# Patient Record
Sex: MALE | Employment: OTHER | URBAN - METROPOLITAN AREA
[De-identification: names, ages, dates, MRNs, and addresses within clinical notes are randomized per-mention and may not be internally consistent; named-entity substitution may affect disease eponyms.]

---

## 2019-11-04 ENCOUNTER — HOSPITAL ENCOUNTER (OUTPATIENT)
Age: 59
Discharge: HOME | End: 2019-11-06
Payer: COMMERCIAL

## 2019-11-04 PROCEDURE — 88305 TISSUE EXAM BY PATHOLOGIST: CPT

## 2020-01-17 ENCOUNTER — HOSPITAL ENCOUNTER (INPATIENT)
Age: 60
LOS: 6 days | Discharge: HOME | DRG: 342 | End: 2020-01-23
Attending: EMERGENCY MEDICINE | Admitting: SURGERY
Payer: COMMERCIAL

## 2020-01-17 ENCOUNTER — ANESTHESIA (OUTPATIENT)
Dept: OPERATING ROOM | Age: 60
DRG: 342 | End: 2020-01-17
Payer: COMMERCIAL

## 2020-01-17 ENCOUNTER — APPOINTMENT (OUTPATIENT)
Dept: CT IMAGING | Age: 60
DRG: 342 | End: 2020-01-17
Payer: COMMERCIAL

## 2020-01-17 ENCOUNTER — ANESTHESIA EVENT (OUTPATIENT)
Dept: OPERATING ROOM | Age: 60
DRG: 342 | End: 2020-01-17
Payer: COMMERCIAL

## 2020-01-17 VITALS — TEMPERATURE: 96.6 F | DIASTOLIC BLOOD PRESSURE: 93 MMHG | SYSTOLIC BLOOD PRESSURE: 163 MMHG | OXYGEN SATURATION: 78 %

## 2020-01-17 PROBLEM — K37 APPENDICITIS: Status: ACTIVE | Noted: 2020-01-17

## 2020-01-17 PROBLEM — S36.892A MESENTERIC HEMATOMA: Status: ACTIVE | Noted: 2020-01-17

## 2020-01-17 LAB
ALBUMIN SERPL-MCNC: 4.6 G/DL (ref 3.5–5.2)
ALP BLD-CCNC: 71 U/L (ref 40–129)
ALT SERPL-CCNC: 83 U/L (ref 0–40)
ANION GAP SERPL CALCULATED.3IONS-SCNC: 14 MMOL/L (ref 7–16)
AST SERPL-CCNC: 45 U/L (ref 0–39)
BACTERIA: ABNORMAL /HPF
BASOPHILS ABSOLUTE: 0.05 E9/L (ref 0–0.2)
BASOPHILS RELATIVE PERCENT: 0.6 % (ref 0–2)
BILIRUB SERPL-MCNC: 0.5 MG/DL (ref 0–1.2)
BILIRUBIN URINE: NEGATIVE
BLOOD, URINE: ABNORMAL
BUN BLDV-MCNC: 16 MG/DL (ref 6–20)
CALCIUM SERPL-MCNC: 10.1 MG/DL (ref 8.6–10.2)
CHLORIDE BLD-SCNC: 94 MMOL/L (ref 98–107)
CLARITY: CLEAR
CO2: 26 MMOL/L (ref 22–29)
COLOR: YELLOW
CREAT SERPL-MCNC: 0.9 MG/DL (ref 0.7–1.2)
EOSINOPHILS ABSOLUTE: 0.13 E9/L (ref 0.05–0.5)
EOSINOPHILS RELATIVE PERCENT: 1.6 % (ref 0–6)
GFR AFRICAN AMERICAN: >60
GFR NON-AFRICAN AMERICAN: >60 ML/MIN/1.73
GLUCOSE BLD-MCNC: 289 MG/DL (ref 74–99)
GLUCOSE URINE: >=1000 MG/DL
HCT VFR BLD CALC: 39.8 % (ref 37–54)
HEMOGLOBIN: 13.6 G/DL (ref 12.5–16.5)
IMMATURE GRANULOCYTES #: 0.03 E9/L
IMMATURE GRANULOCYTES %: 0.4 % (ref 0–5)
KETONES, URINE: NEGATIVE MG/DL
LACTIC ACID: 2 MMOL/L (ref 0.5–2.2)
LACTIC ACID: 2.4 MMOL/L (ref 0.5–2.2)
LEUKOCYTE ESTERASE, URINE: NEGATIVE
LYMPHOCYTES ABSOLUTE: 1.17 E9/L (ref 1.5–4)
LYMPHOCYTES RELATIVE PERCENT: 14.1 % (ref 20–42)
MCH RBC QN AUTO: 30 PG (ref 26–35)
MCHC RBC AUTO-ENTMCNC: 34.2 % (ref 32–34.5)
MCV RBC AUTO: 87.7 FL (ref 80–99.9)
METER GLUCOSE: 148 MG/DL (ref 74–99)
METER GLUCOSE: 236 MG/DL (ref 74–99)
MONOCYTES ABSOLUTE: 0.72 E9/L (ref 0.1–0.95)
MONOCYTES RELATIVE PERCENT: 8.7 % (ref 2–12)
NEUTROPHILS ABSOLUTE: 6.21 E9/L (ref 1.8–7.3)
NEUTROPHILS RELATIVE PERCENT: 74.6 % (ref 43–80)
NITRITE, URINE: NEGATIVE
PDW BLD-RTO: 12.8 FL (ref 11.5–15)
PH UA: 6.5 (ref 5–9)
PLATELET # BLD: 165 E9/L (ref 130–450)
PMV BLD AUTO: 10.2 FL (ref 7–12)
POTASSIUM SERPL-SCNC: 4 MMOL/L (ref 3.5–5)
PROTEIN UA: >=300 MG/DL
RBC # BLD: 4.54 E12/L (ref 3.8–5.8)
RBC UA: ABNORMAL /HPF (ref 0–2)
SODIUM BLD-SCNC: 134 MMOL/L (ref 132–146)
SPECIFIC GRAVITY UA: >=1.03 (ref 1–1.03)
TOTAL PROTEIN: 7.4 G/DL (ref 6.4–8.3)
UROBILINOGEN, URINE: 0.2 E.U./DL
WBC # BLD: 8.3 E9/L (ref 4.5–11.5)
WBC UA: ABNORMAL /HPF (ref 0–5)

## 2020-01-17 PROCEDURE — 6360000002 HC RX W HCPCS: Performed by: ANESTHESIOLOGY

## 2020-01-17 PROCEDURE — 2500000003 HC RX 250 WO HCPCS: Performed by: NURSE ANESTHETIST, CERTIFIED REGISTERED

## 2020-01-17 PROCEDURE — 0WJG4ZZ INSPECTION OF PERITONEAL CAVITY, PERCUTANEOUS ENDOSCOPIC APPROACH: ICD-10-PCS | Performed by: SURGERY

## 2020-01-17 PROCEDURE — 6360000004 HC RX CONTRAST MEDICATION: Performed by: RADIOLOGY

## 2020-01-17 PROCEDURE — 2780000010 HC IMPLANT OTHER: Performed by: SURGERY

## 2020-01-17 PROCEDURE — 82962 GLUCOSE BLOOD TEST: CPT

## 2020-01-17 PROCEDURE — 2580000003 HC RX 258: Performed by: PHYSICIAN ASSISTANT

## 2020-01-17 PROCEDURE — 88304 TISSUE EXAM BY PATHOLOGIST: CPT

## 2020-01-17 PROCEDURE — 6360000002 HC RX W HCPCS: Performed by: SURGERY

## 2020-01-17 PROCEDURE — 87081 CULTURE SCREEN ONLY: CPT

## 2020-01-17 PROCEDURE — 81001 URINALYSIS AUTO W/SCOPE: CPT

## 2020-01-17 PROCEDURE — 74177 CT ABD & PELVIS W/CONTRAST: CPT

## 2020-01-17 PROCEDURE — 99285 EMERGENCY DEPT VISIT HI MDM: CPT

## 2020-01-17 PROCEDURE — 2580000003 HC RX 258: Performed by: NURSE ANESTHETIST, CERTIFIED REGISTERED

## 2020-01-17 PROCEDURE — 2700000000 HC OXYGEN THERAPY PER DAY

## 2020-01-17 PROCEDURE — 6360000002 HC RX W HCPCS: Performed by: NURSE ANESTHETIST, CERTIFIED REGISTERED

## 2020-01-17 PROCEDURE — 3600000004 HC SURGERY LEVEL 4 BASE: Performed by: SURGERY

## 2020-01-17 PROCEDURE — 7100000000 HC PACU RECOVERY - FIRST 15 MIN: Performed by: SURGERY

## 2020-01-17 PROCEDURE — 3700000000 HC ANESTHESIA ATTENDED CARE: Performed by: SURGERY

## 2020-01-17 PROCEDURE — 0DJV0ZZ INSPECTION OF MESENTERY, OPEN APPROACH: ICD-10-PCS | Performed by: SURGERY

## 2020-01-17 PROCEDURE — 2709999900 HC NON-CHARGEABLE SUPPLY: Performed by: SURGERY

## 2020-01-17 PROCEDURE — 0DTJ0ZZ RESECTION OF APPENDIX, OPEN APPROACH: ICD-10-PCS | Performed by: SURGERY

## 2020-01-17 PROCEDURE — 3700000001 HC ADD 15 MINUTES (ANESTHESIA): Performed by: SURGERY

## 2020-01-17 PROCEDURE — 3600000014 HC SURGERY LEVEL 4 ADDTL 15MIN: Performed by: SURGERY

## 2020-01-17 PROCEDURE — 2720000010 HC SURG SUPPLY STERILE: Performed by: SURGERY

## 2020-01-17 PROCEDURE — 83605 ASSAY OF LACTIC ACID: CPT

## 2020-01-17 PROCEDURE — 85025 COMPLETE CBC W/AUTO DIFF WBC: CPT

## 2020-01-17 PROCEDURE — 2580000003 HC RX 258: Performed by: SURGERY

## 2020-01-17 PROCEDURE — 2060000000 HC ICU INTERMEDIATE R&B

## 2020-01-17 PROCEDURE — 36415 COLL VENOUS BLD VENIPUNCTURE: CPT

## 2020-01-17 PROCEDURE — 99255 IP/OBS CONSLTJ NEW/EST HI 80: CPT | Performed by: SURGERY

## 2020-01-17 PROCEDURE — 6360000002 HC RX W HCPCS: Performed by: PHYSICIAN ASSISTANT

## 2020-01-17 PROCEDURE — 2500000003 HC RX 250 WO HCPCS: Performed by: PHYSICIAN ASSISTANT

## 2020-01-17 PROCEDURE — 80053 COMPREHEN METABOLIC PANEL: CPT

## 2020-01-17 PROCEDURE — 96365 THER/PROPH/DIAG IV INF INIT: CPT

## 2020-01-17 PROCEDURE — 7100000001 HC PACU RECOVERY - ADDTL 15 MIN: Performed by: SURGERY

## 2020-01-17 RX ORDER — ONDANSETRON 2 MG/ML
INJECTION INTRAMUSCULAR; INTRAVENOUS PRN
Status: DISCONTINUED | OUTPATIENT
Start: 2020-01-17 | End: 2020-01-17 | Stop reason: SDUPTHER

## 2020-01-17 RX ORDER — ROCURONIUM BROMIDE 10 MG/ML
INJECTION, SOLUTION INTRAVENOUS PRN
Status: DISCONTINUED | OUTPATIENT
Start: 2020-01-17 | End: 2020-01-17 | Stop reason: SDUPTHER

## 2020-01-17 RX ORDER — ONDANSETRON 2 MG/ML
4 INJECTION INTRAMUSCULAR; INTRAVENOUS EVERY 6 HOURS PRN
Status: DISCONTINUED | OUTPATIENT
Start: 2020-01-17 | End: 2020-01-23 | Stop reason: HOSPADM

## 2020-01-17 RX ORDER — OXYCODONE HYDROCHLORIDE AND ACETAMINOPHEN 5; 325 MG/1; MG/1
1 TABLET ORAL EVERY 4 HOURS PRN
Status: DISCONTINUED | OUTPATIENT
Start: 2020-01-17 | End: 2020-01-23 | Stop reason: HOSPADM

## 2020-01-17 RX ORDER — OXYCODONE HYDROCHLORIDE AND ACETAMINOPHEN 5; 325 MG/1; MG/1
2 TABLET ORAL EVERY 4 HOURS PRN
Status: DISCONTINUED | OUTPATIENT
Start: 2020-01-17 | End: 2020-01-23 | Stop reason: HOSPADM

## 2020-01-17 RX ORDER — MORPHINE SULFATE 2 MG/ML
2 INJECTION, SOLUTION INTRAMUSCULAR; INTRAVENOUS EVERY 4 HOURS PRN
Status: DISCONTINUED | OUTPATIENT
Start: 2020-01-17 | End: 2020-01-23

## 2020-01-17 RX ORDER — GLYBURIDE 5 MG/1
5 TABLET ORAL 2 TIMES DAILY WITH MEALS
COMMUNITY
End: 2020-12-03

## 2020-01-17 RX ORDER — SERTRALINE HYDROCHLORIDE 25 MG/1
100 TABLET, FILM COATED ORAL DAILY
COMMUNITY
End: 2020-12-03

## 2020-01-17 RX ORDER — FENOFIBRATE 134 MG/1
134 CAPSULE ORAL
COMMUNITY
End: 2020-12-03 | Stop reason: SDUPTHER

## 2020-01-17 RX ORDER — LOSARTAN POTASSIUM 100 MG/1
100 TABLET ORAL DAILY
COMMUNITY
End: 2020-08-10 | Stop reason: SDUPTHER

## 2020-01-17 RX ORDER — LABETALOL HYDROCHLORIDE 5 MG/ML
5 INJECTION, SOLUTION INTRAVENOUS EVERY 10 MIN PRN
Status: DISCONTINUED | OUTPATIENT
Start: 2020-01-17 | End: 2020-01-17 | Stop reason: HOSPADM

## 2020-01-17 RX ORDER — PROPOFOL 10 MG/ML
INJECTION, EMULSION INTRAVENOUS PRN
Status: DISCONTINUED | OUTPATIENT
Start: 2020-01-17 | End: 2020-01-17 | Stop reason: SDUPTHER

## 2020-01-17 RX ORDER — SODIUM CHLORIDE 9 MG/ML
INJECTION, SOLUTION INTRAVENOUS CONTINUOUS PRN
Status: DISCONTINUED | OUTPATIENT
Start: 2020-01-17 | End: 2020-01-17 | Stop reason: SDUPTHER

## 2020-01-17 RX ORDER — FENTANYL CITRATE 50 UG/ML
INJECTION, SOLUTION INTRAMUSCULAR; INTRAVENOUS PRN
Status: DISCONTINUED | OUTPATIENT
Start: 2020-01-17 | End: 2020-01-17 | Stop reason: SDUPTHER

## 2020-01-17 RX ORDER — SODIUM CHLORIDE, SODIUM LACTATE, POTASSIUM CHLORIDE, CALCIUM CHLORIDE 600; 310; 30; 20 MG/100ML; MG/100ML; MG/100ML; MG/100ML
INJECTION, SOLUTION INTRAVENOUS CONTINUOUS PRN
Status: DISCONTINUED | OUTPATIENT
Start: 2020-01-17 | End: 2020-01-17

## 2020-01-17 RX ORDER — METOPROLOL SUCCINATE 50 MG/1
50 TABLET, EXTENDED RELEASE ORAL DAILY
Status: ON HOLD | COMMUNITY
End: 2020-01-23 | Stop reason: HOSPADM

## 2020-01-17 RX ORDER — ALPRAZOLAM 0.25 MG/1
0.25 TABLET ORAL 2 TIMES DAILY PRN
COMMUNITY
End: 2021-05-13

## 2020-01-17 RX ORDER — SODIUM CHLORIDE 9 MG/ML
INJECTION, SOLUTION INTRAVENOUS CONTINUOUS
Status: DISCONTINUED | OUTPATIENT
Start: 2020-01-17 | End: 2020-01-20

## 2020-01-17 RX ORDER — SODIUM CHLORIDE 0.9 % (FLUSH) 0.9 %
10 SYRINGE (ML) INJECTION PRN
Status: DISCONTINUED | OUTPATIENT
Start: 2020-01-17 | End: 2020-01-23 | Stop reason: HOSPADM

## 2020-01-17 RX ORDER — SODIUM CHLORIDE 0.9 % (FLUSH) 0.9 %
10 SYRINGE (ML) INJECTION EVERY 12 HOURS SCHEDULED
Status: DISCONTINUED | OUTPATIENT
Start: 2020-01-18 | End: 2020-01-23 | Stop reason: HOSPADM

## 2020-01-17 RX ORDER — SODIUM CHLORIDE 9 MG/ML
INJECTION, SOLUTION INTRAVENOUS CONTINUOUS
Status: DISCONTINUED | OUTPATIENT
Start: 2020-01-18 | End: 2020-01-18

## 2020-01-17 RX ORDER — PRAVASTATIN SODIUM 40 MG
40 TABLET ORAL DAILY
COMMUNITY
End: 2020-09-08 | Stop reason: SDUPTHER

## 2020-01-17 RX ORDER — MIDAZOLAM HYDROCHLORIDE 1 MG/ML
INJECTION INTRAMUSCULAR; INTRAVENOUS PRN
Status: DISCONTINUED | OUTPATIENT
Start: 2020-01-17 | End: 2020-01-17 | Stop reason: SDUPTHER

## 2020-01-17 RX ORDER — DEXAMETHASONE SODIUM PHOSPHATE 4 MG/ML
INJECTION, SOLUTION INTRA-ARTICULAR; INTRALESIONAL; INTRAMUSCULAR; INTRAVENOUS; SOFT TISSUE PRN
Status: DISCONTINUED | OUTPATIENT
Start: 2020-01-17 | End: 2020-01-17 | Stop reason: SDUPTHER

## 2020-01-17 RX ORDER — MORPHINE SULFATE 2 MG/ML
2 INJECTION, SOLUTION INTRAMUSCULAR; INTRAVENOUS ONCE
Status: DISCONTINUED | OUTPATIENT
Start: 2020-01-17 | End: 2020-01-17

## 2020-01-17 RX ORDER — LIDOCAINE HYDROCHLORIDE 20 MG/ML
INJECTION, SOLUTION EPIDURAL; INFILTRATION; INTRACAUDAL; PERINEURAL PRN
Status: DISCONTINUED | OUTPATIENT
Start: 2020-01-17 | End: 2020-01-17 | Stop reason: SDUPTHER

## 2020-01-17 RX ORDER — SODIUM CHLORIDE, SODIUM LACTATE, POTASSIUM CHLORIDE, CALCIUM CHLORIDE 600; 310; 30; 20 MG/100ML; MG/100ML; MG/100ML; MG/100ML
1000 INJECTION, SOLUTION INTRAVENOUS CONTINUOUS
Status: DISCONTINUED | OUTPATIENT
Start: 2020-01-17 | End: 2020-01-17

## 2020-01-17 RX ORDER — ACETAMINOPHEN 325 MG/1
650 TABLET ORAL EVERY 4 HOURS PRN
Status: DISCONTINUED | OUTPATIENT
Start: 2020-01-17 | End: 2020-01-21

## 2020-01-17 RX ORDER — MORPHINE SULFATE 4 MG/ML
4 INJECTION, SOLUTION INTRAMUSCULAR; INTRAVENOUS EVERY 4 HOURS PRN
Status: DISCONTINUED | OUTPATIENT
Start: 2020-01-17 | End: 2020-01-23

## 2020-01-17 RX ORDER — DIPHENHYDRAMINE HYDROCHLORIDE 50 MG/ML
INJECTION INTRAMUSCULAR; INTRAVENOUS PRN
Status: DISCONTINUED | OUTPATIENT
Start: 2020-01-17 | End: 2020-01-17 | Stop reason: SDUPTHER

## 2020-01-17 RX ORDER — PROMETHAZINE HYDROCHLORIDE 25 MG/ML
6.25 INJECTION, SOLUTION INTRAMUSCULAR; INTRAVENOUS
Status: DISCONTINUED | OUTPATIENT
Start: 2020-01-17 | End: 2020-01-17 | Stop reason: HOSPADM

## 2020-01-17 RX ORDER — SODIUM CHLORIDE 0.9 % (FLUSH) 0.9 %
10 SYRINGE (ML) INJECTION EVERY 12 HOURS SCHEDULED
Status: DISCONTINUED | OUTPATIENT
Start: 2020-01-17 | End: 2020-01-17

## 2020-01-17 RX ADMIN — DIPHENHYDRAMINE HYDROCHLORIDE 25 MG: 50 INJECTION, SOLUTION INTRAMUSCULAR; INTRAVENOUS at 20:06

## 2020-01-17 RX ADMIN — FENTANYL CITRATE 100 MCG: 50 INJECTION, SOLUTION INTRAMUSCULAR; INTRAVENOUS at 22:08

## 2020-01-17 RX ADMIN — ROCURONIUM BROMIDE 10 MG: 10 SOLUTION INTRAVENOUS at 21:18

## 2020-01-17 RX ADMIN — CEFTRIAXONE 2 G: 2 INJECTION, POWDER, FOR SOLUTION INTRAMUSCULAR; INTRAVENOUS at 18:15

## 2020-01-17 RX ADMIN — SUGAMMADEX 400 MG: 100 INJECTION, SOLUTION INTRAVENOUS at 21:38

## 2020-01-17 RX ADMIN — SODIUM CHLORIDE: 9 INJECTION, SOLUTION INTRAVENOUS at 18:15

## 2020-01-17 RX ADMIN — ONDANSETRON HYDROCHLORIDE 4 MG: 2 INJECTION, SOLUTION INTRAMUSCULAR; INTRAVENOUS at 20:06

## 2020-01-17 RX ADMIN — HYDROMORPHONE HYDROCHLORIDE 0.5 MG: 1 INJECTION, SOLUTION INTRAMUSCULAR; INTRAVENOUS; SUBCUTANEOUS at 23:13

## 2020-01-17 RX ADMIN — IOPAMIDOL 110 ML: 755 INJECTION, SOLUTION INTRAVENOUS at 15:25

## 2020-01-17 RX ADMIN — ROCURONIUM BROMIDE 40 MG: 10 SOLUTION INTRAVENOUS at 20:06

## 2020-01-17 RX ADMIN — MIDAZOLAM 2 MG: 1 INJECTION INTRAMUSCULAR; INTRAVENOUS at 20:00

## 2020-01-17 RX ADMIN — METRONIDAZOLE 500 MG: 500 INJECTION, SOLUTION INTRAVENOUS at 16:01

## 2020-01-17 RX ADMIN — FENTANYL CITRATE 50 MCG: 50 INJECTION, SOLUTION INTRAMUSCULAR; INTRAVENOUS at 21:18

## 2020-01-17 RX ADMIN — LIDOCAINE HYDROCHLORIDE 100 MG: 20 INJECTION, SOLUTION EPIDURAL; INFILTRATION; INTRACAUDAL; PERINEURAL at 20:06

## 2020-01-17 RX ADMIN — HYDROMORPHONE HYDROCHLORIDE 0.5 MG: 1 INJECTION, SOLUTION INTRAMUSCULAR; INTRAVENOUS; SUBCUTANEOUS at 22:50

## 2020-01-17 RX ADMIN — PROPOFOL 200 MG: 10 INJECTION, EMULSION INTRAVENOUS at 20:06

## 2020-01-17 RX ADMIN — SUGAMMADEX 100 MG: 100 INJECTION, SOLUTION INTRAVENOUS at 21:45

## 2020-01-17 RX ADMIN — SODIUM CHLORIDE: 9 INJECTION, SOLUTION INTRAVENOUS at 20:50

## 2020-01-17 RX ADMIN — DEXAMETHASONE SODIUM PHOSPHATE 8 MG: 4 INJECTION, SOLUTION INTRAMUSCULAR; INTRAVENOUS at 20:06

## 2020-01-17 RX ADMIN — MORPHINE SULFATE 4 MG: 4 INJECTION, SOLUTION INTRAMUSCULAR; INTRAVENOUS at 18:27

## 2020-01-17 RX ADMIN — FENTANYL CITRATE 50 MCG: 50 INJECTION, SOLUTION INTRAMUSCULAR; INTRAVENOUS at 22:13

## 2020-01-17 RX ADMIN — ROCURONIUM BROMIDE 20 MG: 10 SOLUTION INTRAVENOUS at 21:25

## 2020-01-17 RX ADMIN — SODIUM CHLORIDE, POTASSIUM CHLORIDE, SODIUM LACTATE AND CALCIUM CHLORIDE: 600; 310; 30; 20 INJECTION, SOLUTION INTRAVENOUS at 20:03

## 2020-01-17 RX ADMIN — FENTANYL CITRATE 200 MCG: 50 INJECTION, SOLUTION INTRAMUSCULAR; INTRAVENOUS at 20:06

## 2020-01-17 SDOH — HEALTH STABILITY: MENTAL HEALTH: HOW OFTEN DO YOU HAVE A DRINK CONTAINING ALCOHOL?: NEVER

## 2020-01-17 ASSESSMENT — PULMONARY FUNCTION TESTS
PIF_VALUE: 24
PIF_VALUE: 25
PIF_VALUE: 26
PIF_VALUE: 18
PIF_VALUE: 21
PIF_VALUE: 25
PIF_VALUE: 33
PIF_VALUE: 24
PIF_VALUE: 13
PIF_VALUE: 22
PIF_VALUE: 21
PIF_VALUE: 25
PIF_VALUE: 20
PIF_VALUE: 27
PIF_VALUE: 21
PIF_VALUE: 31
PIF_VALUE: 5
PIF_VALUE: 24
PIF_VALUE: 24
PIF_VALUE: 26
PIF_VALUE: 13
PIF_VALUE: 22
PIF_VALUE: 26
PIF_VALUE: 25
PIF_VALUE: 27
PIF_VALUE: 1
PIF_VALUE: 25
PIF_VALUE: 25
PIF_VALUE: 1
PIF_VALUE: 34
PIF_VALUE: 31
PIF_VALUE: 0
PIF_VALUE: 25
PIF_VALUE: 30
PIF_VALUE: 25
PIF_VALUE: 24
PIF_VALUE: 18
PIF_VALUE: 25
PIF_VALUE: 26
PIF_VALUE: 21
PIF_VALUE: 21
PIF_VALUE: 26
PIF_VALUE: 23
PIF_VALUE: 24
PIF_VALUE: 24
PIF_VALUE: 26
PIF_VALUE: 3
PIF_VALUE: 34
PIF_VALUE: 31
PIF_VALUE: 22
PIF_VALUE: 21
PIF_VALUE: 25
PIF_VALUE: 5
PIF_VALUE: 25
PIF_VALUE: 22
PIF_VALUE: 25
PIF_VALUE: 21
PIF_VALUE: 21
PIF_VALUE: 24
PIF_VALUE: 25
PIF_VALUE: 25
PIF_VALUE: 21
PIF_VALUE: 24
PIF_VALUE: 26
PIF_VALUE: 22
PIF_VALUE: 27
PIF_VALUE: 22
PIF_VALUE: 25
PIF_VALUE: 0
PIF_VALUE: 18
PIF_VALUE: 19
PIF_VALUE: 25
PIF_VALUE: 22
PIF_VALUE: 33
PIF_VALUE: 9
PIF_VALUE: 18
PIF_VALUE: 1
PIF_VALUE: 18
PIF_VALUE: 2
PIF_VALUE: 25
PIF_VALUE: 25
PIF_VALUE: 24
PIF_VALUE: 22
PIF_VALUE: 24
PIF_VALUE: 26
PIF_VALUE: 26
PIF_VALUE: 22
PIF_VALUE: 24
PIF_VALUE: 24
PIF_VALUE: 32
PIF_VALUE: 15
PIF_VALUE: 25
PIF_VALUE: 24
PIF_VALUE: 1
PIF_VALUE: 31
PIF_VALUE: 25
PIF_VALUE: 1
PIF_VALUE: 18
PIF_VALUE: 22
PIF_VALUE: 26
PIF_VALUE: 22
PIF_VALUE: 24
PIF_VALUE: 18
PIF_VALUE: 24
PIF_VALUE: 39
PIF_VALUE: 35
PIF_VALUE: 20
PIF_VALUE: 25
PIF_VALUE: 24
PIF_VALUE: 30
PIF_VALUE: 25
PIF_VALUE: 25
PIF_VALUE: 37
PIF_VALUE: 14
PIF_VALUE: 25
PIF_VALUE: 25
PIF_VALUE: 1
PIF_VALUE: 31
PIF_VALUE: 1
PIF_VALUE: 25
PIF_VALUE: 0
PIF_VALUE: 30
PIF_VALUE: 0
PIF_VALUE: 33
PIF_VALUE: 0
PIF_VALUE: 22

## 2020-01-17 ASSESSMENT — LIFESTYLE VARIABLES: SMOKING_STATUS: 0

## 2020-01-17 ASSESSMENT — PAIN DESCRIPTION - FREQUENCY
FREQUENCY: INTERMITTENT
FREQUENCY: INTERMITTENT
FREQUENCY: CONTINUOUS

## 2020-01-17 ASSESSMENT — PAIN DESCRIPTION - LOCATION
LOCATION: ABDOMEN

## 2020-01-17 ASSESSMENT — PAIN SCALES - WONG BAKER
WONGBAKER_NUMERICALRESPONSE: 6

## 2020-01-17 ASSESSMENT — PAIN SCALES - GENERAL
PAINLEVEL_OUTOF10: 6
PAINLEVEL_OUTOF10: 8
PAINLEVEL_OUTOF10: 4
PAINLEVEL_OUTOF10: 7
PAINLEVEL_OUTOF10: 5

## 2020-01-17 ASSESSMENT — PAIN DESCRIPTION - DESCRIPTORS
DESCRIPTORS: ACHING;HEADACHE;HEAVINESS
DESCRIPTORS: ACHING;DISCOMFORT;DULL
DESCRIPTORS: THROBBING

## 2020-01-17 ASSESSMENT — PAIN DESCRIPTION - ONSET
ONSET: ON-GOING
ONSET: ON-GOING

## 2020-01-17 ASSESSMENT — PAIN DESCRIPTION - PAIN TYPE
TYPE: ACUTE PAIN

## 2020-01-17 ASSESSMENT — PAIN DESCRIPTION - PROGRESSION
CLINICAL_PROGRESSION: NOT CHANGED
CLINICAL_PROGRESSION: GRADUALLY WORSENING

## 2020-01-17 ASSESSMENT — PAIN DESCRIPTION - ORIENTATION
ORIENTATION: RIGHT
ORIENTATION: RIGHT;LOWER
ORIENTATION: RIGHT

## 2020-01-17 ASSESSMENT — PAIN - FUNCTIONAL ASSESSMENT
PAIN_FUNCTIONAL_ASSESSMENT: ACTIVITIES ARE NOT PREVENTED
PAIN_FUNCTIONAL_ASSESSMENT: ACTIVITIES ARE NOT PREVENTED

## 2020-01-17 NOTE — ED PROVIDER NOTES
ED Attending  CC: Esperanza       Department of Emergency Medicine   ED  Provider Note  Admit Date/RoomTime: 1/17/2020 12:08 PM  ED Room: CARISSA/CARISSA  Chief Complaint     Abdominal Pain (RLQ)    History of Present Illness   Source of history provided by:  patient. History/Exam Limitations: none. Chas Parson is a 64 y.o. old male who has a past medical history of: History reviewed. No pertinent past medical history. presents to the emergency department by private vehicle, for complaints of sided abdominal pain mostly right lower quadrant starting yesterday. Patient states it has stayed about the same since it started. Anything making it worse or better. Patient went to his PCPs office today and they sent him here for appendicitis rule out. Patient denies previous abdominal surgeries. PMHx DM, HTN. Denies fever/chills, headache, vision change, dizziness, chest pain, dyspnea, NVD, melena, bloody stools, urinary symptoms, numbness/weakness. ROS   Pertinent positives and negatives are stated within HPI, all other systems reviewed and are negative. Past Surgical History:   Procedure Laterality Date    COLONOSCOPY  11/04/2019    Tubular adenoma; diverticulosis   Social History:  reports that he has never smoked. He has never used smokeless tobacco. He reports that he does not drink alcohol or use drugs. Family History: family history is not on file. Allergies: Patient has no known allergies. Physical Exam           ED Triage Vitals [01/17/20 1206]   BP Temp Temp src Pulse Resp SpO2 Height Weight   (!) 170/98 98.8 °F (37.1 °C) -- 105 16 95 % 5' 6\" (1.676 m) 212 lb (96.2 kg)      Oxygen Saturation Interpretation: Normal.    · General Appearance/Constitutional:  Alert, development consistent with age. · HEENT:  NC/NT. PERRLA. Airway patent. · Neck:  Supple. No lymphadenopathy. · Respiratory: Lungs Clear to auscultation and breath sounds equal.  · CV:  Regular rate and rhythm.   · GI:  General Appearance: normal.         Bowel sounds: normal bowel sounds. Distension:  None. Tenderness: mild tenderness is present in the RUQ and in the RLQ. Liver: non-palpable. Spleen:  non-tender. Pulsatile Mass: absent. Hernia:  no inguinal or femoral hernias noted. · Back: CVA Tenderness: No.  · Integument:  Normal turgor. Warm, dry, without visible rash, unless noted elsewhere. · Neurological:  Orientation age-appropriate. Motor functions intact.     Lab / Imaging Results   (All laboratory and radiology results have been personally reviewed by myself)  Labs:  Results for orders placed or performed during the hospital encounter of 01/17/20   CBC auto differential   Result Value Ref Range    WBC 8.3 4.5 - 11.5 E9/L    RBC 4.54 3.80 - 5.80 E12/L    Hemoglobin 13.6 12.5 - 16.5 g/dL    Hematocrit 39.8 37.0 - 54.0 %    MCV 87.7 80.0 - 99.9 fL    MCH 30.0 26.0 - 35.0 pg    MCHC 34.2 32.0 - 34.5 %    RDW 12.8 11.5 - 15.0 fL    Platelets 763 242 - 071 E9/L    MPV 10.2 7.0 - 12.0 fL    Neutrophils % 74.6 43.0 - 80.0 %    Immature Granulocytes % 0.4 0.0 - 5.0 %    Lymphocytes % 14.1 (L) 20.0 - 42.0 %    Monocytes % 8.7 2.0 - 12.0 %    Eosinophils % 1.6 0.0 - 6.0 %    Basophils % 0.6 0.0 - 2.0 %    Neutrophils Absolute 6.21 1.80 - 7.30 E9/L    Immature Granulocytes # 0.03 E9/L    Lymphocytes Absolute 1.17 (L) 1.50 - 4.00 E9/L    Monocytes Absolute 0.72 0.10 - 0.95 E9/L    Eosinophils Absolute 0.13 0.05 - 0.50 E9/L    Basophils Absolute 0.05 0.00 - 0.20 E9/L   Comprehensive Metabolic Panel   Result Value Ref Range    Sodium 134 132 - 146 mmol/L    Potassium 4.0 3.5 - 5.0 mmol/L    Chloride 94 (L) 98 - 107 mmol/L    CO2 26 22 - 29 mmol/L    Anion Gap 14 7 - 16 mmol/L    Glucose 289 (H) 74 - 99 mg/dL    BUN 16 6 - 20 mg/dL    CREATININE 0.9 0.7 - 1.2 mg/dL    GFR Non-African American >60 >=60 mL/min/1.73    GFR African American >60     Calcium 10.1 8.6 - 10.2 mg/dL    Total Protein 7.4 6.4 - 8.3 g/dL    Alb 4.6 3.5 - 5.2 g/dL    Total Bilirubin 0.5 0.0 - 1.2 mg/dL    Alkaline Phosphatase 71 40 - 129 U/L    ALT 83 (H) 0 - 40 U/L    AST 45 (H) 0 - 39 U/L   Lactic Acid, Plasma   Result Value Ref Range    Lactic Acid 2.4 (H) 0.5 - 2.2 mmol/L   Urinalysis with Microscopic   Result Value Ref Range    Color, UA Yellow Straw/Yellow    Clarity, UA Clear Clear    Glucose, Ur >=1000 (A) Negative mg/dL    Bilirubin Urine Negative Negative    Ketones, Urine Negative Negative mg/dL    Specific Gravity, UA >=1.030 1.005 - 1.030    Blood, Urine TRACE-INTACT Negative    pH, UA 6.5 5.0 - 9.0    Protein, UA >=300 (A) Negative mg/dL    Urobilinogen, Urine 0.2 <2.0 E.U./dL    Nitrite, Urine Negative Negative    Leukocyte Esterase, Urine Negative Negative    WBC, UA NONE 0 - 5 /HPF    RBC, UA 0-1 0 - 2 /HPF    Bacteria, UA NONE /HPF     Imaging: All Radiology results interpreted by Radiologist unless otherwise noted. CT ABDOMEN PELVIS W IV CONTRAST Additional Contrast? None   Final Result         1. The appendix is mildly dilated to 9 mm. Mild mesenteric edema is   seen adjacent to the appendix but not surrounding it. These findings   may signify early acute appendicitis. 2. Mild hepatosplenomegaly. Hepatic steatosis. 3. Cholelithiasis. ED Course / Medical Decision Making     Medications   metronidazole (FLAGYL) 500 mg in NaCl 100 mL IVPB premix (500 mg Intravenous New Bag 1/17/20 1601)   cefTRIAXone (ROCEPHIN) 2 g IVPB in D5W 50ml minibag (has no administration in time range)   iopamidol (ISOVUE-370) 76 % injection 110 mL (110 mLs Intravenous Given 1/17/20 1525)     ED Course as of Jan 17 1637 Fri Jan 17, 2020   1303 Patient declining pain medication    [KL]      ED Course User Index  [KL] Jens Sherman PA-C         Consultations:             IP CONSULT TO GENERAL SURGERY 1550 spoke with Dr. Jessica Burr and will send residents to evaluate.  1612 spoke with residents and they will talk with Dr. Jennifer Bradshaw     Procedures:   none    MDM: Presenting with right lower quadrant abdominal pain starting yesterday. Patient denies any associated symptoms. Patient is in no acute distress and is nontoxic in appearance. Refused pain medication. Denies previous abdominal surgeries. Patient CT showing early appendicitis. Call to Dr. Jennifer Bradshaw who will take patient to the OR. Counseling:   I have spoken with the patient and discussed todays results, in addition to providing specific details for the plan of care and counseling regarding the diagnosis and prognosis and are agreeable with the plan. Assessment      1. Abdominal pain, right lower quadrant    2. Acute appendicitis with perforation and generalized peritonitis, without abscess or gangrene      This patient's ED course included: a personal history and physicial examination and multiple bedside re-evaluations  This patient has remained hemodynamically stable during their ED course. Plan   Admit to operating room. Patient condition is stable. New Medications     New Prescriptions    No medications on file     Electronically signed by Aftab Hines PA-C   DD: 1/17/20  **This report was transcribed using voice recognition software. Every effort was made to ensure accuracy; however, inadvertent computerized transcription errors may be present.   END OF PROVIDER NOTE     Aftab Hines PA-C  01/17/20 8336

## 2020-01-17 NOTE — ED NOTES
Bed:  [place] [address]  Expected date:   Expected time:   Means of arrival:   Comments:  Piter Meek RN  01/17/20 9232

## 2020-01-17 NOTE — ANESTHESIA PRE PROCEDURE
chloride infusion   Intravenous Continuous Chaparrita E Kaercher, DO        [START ON 1/18/2020] metronidazole (FLAGYL) 500 mg in NaCl 100 mL IVPB premix  500 mg Intravenous Q8H Chaparrita E Kaercher, DO        [START ON 1/18/2020] cefTRIAXone (ROCEPHIN) 1 g in dextrose 5 % 50 mL IVPB (vial-mate)  1 g Intravenous Daily Chaparrita E Kaercher, DO        acetaminophen (TYLENOL) tablet 650 mg  650 mg Oral Q4H PRN Chaparrita E Kaercher, DO        morphine (PF) injection 2 mg  2 mg Intravenous Q4H PRN Chaparrita E Kaercher, DO        Or    morphine sulfate (PF) injection 4 mg  4 mg Intravenous Q4H PRN Chaparrita E Kaercher, DO        oxyCODONE-acetaminophen (PERCOCET) 5-325 MG per tablet 1 tablet  1 tablet Oral Q4H PRN Chaparrita E Kaercher, DO        Or    oxyCODONE-acetaminophen (PERCOCET) 5-325 MG per tablet 2 tablet  2 tablet Oral Q4H PRN Chaparrita E Kaercher, DO           Allergies:  No Known Allergies    Problem List:    Patient Active Problem List   Diagnosis Code    Appendicitis K37       Past Medical History:        Diagnosis Date    Diabetes mellitus ([facility] [address]     Hypertension        Past Surgical History:        Procedure Laterality Date    COLONOSCOPY  11/04/2019    Tubular adenoma; diverticulosis       Social History:    Social History     Tobacco Use    Smoking status: Never Smoker    Smokeless tobacco: Never Used   Substance Use Topics    Alcohol use: Never     Frequency: Never                                Counseling given: Not Answered      Vital Signs (Current):   Vitals:    01/17/20 1206 01/17/20 1651 01/17/20 1745   BP: (!) 170/98 (!) 185/98    Pulse: 105 102 97   Resp: 16 14 16   Temp: 98.8 °F (37.1 °C) 97.6 °F (36.4 °C) 98.4 °F (36.9 °C)   TempSrc:  Temporal Oral   SpO2: 95% 97% 97%   Weight: 212 lb (96.2 kg)     Height: 5' 6\" (1.676 m)                                                BP Readings from Last 3 Encounters:   01/17/20 (!) 185/98       NPO Status: Time of last liquid consumption: 1200 Time of last solid consumption: 1200                        Date of last liquid consumption: 01/16/20                        Date of last solid food consumption: 01/16/20    BMI:   Wt Readings from Last 3 Encounters:   01/17/20 212 lb (96.2 kg)     Body mass index is 34.22 kg/m². CBC:   Lab Results   Component Value Date    WBC 8.3 01/17/2020    RBC 4.54 01/17/2020    HGB 13.6 01/17/2020    HCT 39.8 01/17/2020    MCV 87.7 01/17/2020    RDW 12.8 01/17/2020     01/17/2020       CMP:   Lab Results   Component Value Date     01/17/2020    K 4.0 01/17/2020    CL 94 01/17/2020    CO2 26 01/17/2020    BUN 16 01/17/2020    CREATININE 0.9 01/17/2020    GFRAA >60 01/17/2020    LABGLOM >60 01/17/2020    GLUCOSE 289 01/17/2020    PROT 7.4 01/17/2020    CALCIUM 10.1 01/17/2020    BILITOT 0.5 01/17/2020    ALKPHOS 71 01/17/2020    AST 45 01/17/2020    ALT 83 01/17/2020       POC Tests: No results for input(s): POCGLU, POCNA, POCK, POCCL, POCBUN, POCHEMO, POCHCT in the last 72 hours.     Coags: No results found for: PROTIME, INR, APTT    HCG (If Applicable): No results found for: PREGTESTUR, PREGSERUM, HCG, HCGQUANT     ABGs: No results found for: PHART, PO2ART, DTN8NJX, DKA6QCO, BEART, C4ZSDSLJ     Type & Screen (If Applicable):  No results found for: LABABO, [address]    Anesthesia Evaluation  Patient summary reviewed and Nursing notes reviewed no history of anesthetic complications:   Airway: Mallampati: III  TM distance: >3 FB   Neck ROM: full  Mouth opening: > = 3 FB Dental: normal exam         Pulmonary:Negative Pulmonary ROS breath sounds clear to auscultation      (-) not a current smoker                           Cardiovascular:    (+) hypertension:,         Rhythm: regular  Rate: normal                    Neuro/Psych:   (+) depression/anxiety             GI/Hepatic/Renal: Neg GI/Hepatic/Renal ROS            Endo/Other:    (+) DiabetesType II DM, poorly controlled, , .                 Abdominal: Vascular: negative vascular ROS. Anesthesia Plan      general     ASA 2       Induction: intravenous. MIPS: Postoperative opioids intended and Prophylactic antiemetics administered. Anesthetic plan and risks discussed with patient.       Plan discussed with CRNA and surgical team.                  Krish Hsu MD   1/17/2020

## 2020-01-17 NOTE — H&P
increased work of breathing. CARDIOVASCULAR: RR  ABDOMEN:  Soft, non-distended,RLQ TTP with mild rebound. No guarding, rigidity. EXTREMITIES:   MAEx4. Atraumatic. No LE edema. SKIN:  Warm and dry      LABS:    CBC  Recent Labs     20  1304   WBC 8.3   HGB 13.6   HCT 39.8        BMP  Recent Labs     20  1304      K 4.0   CL 94*   CO2 26   BUN 16   CREATININE 0.9   CALCIUM 10.1     Liver Function  Recent Labs     20  1304   BILITOT 0.5   AST 45*   ALT 83*   ALKPHOS 71   PROT 7.4   LABALBU 4.6     No results for input(s): LACTATE in the last 72 hours. No results for input(s): INR, PTT in the last 72 hours. Invalid input(s): PT    RADIOLOGY    Ct Abdomen Pelvis W Iv Contrast Additional Contrast? None    Result Date: 2020  Patient MRN:  [id number] : 1960 Age: 64 years Gender: Male Order Date:  2020 12:30 PM EXAM: CT ABDOMEN PELVIS W IV CONTRAST INDICATION:  right sided pain, mostly RLQ right sided pain, mostly RLQ  COMPARISON: None Technique: Low-dose CT  acquisition technique included one of following options; 1 . Automated exposure control, 2. Adjustment of MA and or KV according to patient's size or 3. Use of iterative reconstruction. Multiple computerized tomography sections of the abdomen with sagittal and coronal MPR reconstructions were obtained from the top of the diaphragm to the pelvis. The visualized portions of the abdomen reveal: FINDINGS: LUNG BASES: Unremarkable. LIVER: The liver is enlarged, measuring 22.5 cm in length. Diffuse steatosis. 7 mm hypodense focus in the right lobe of the liver of unclear etiology (series 2, image 46). GALLBLADDER: Multiple calcified gallstones. No pericholecystic edema. SPLEEN: Enlarged, measuring 16.8 cm in maximum AP dimension. ADRENALS:Unremarkable. KIDNEYS: Normal in size with symmetric nephrograms. Multiple hypodense foci within it likely represents cysts. No hydronephrosis. PANCREAS:Unremarkable. BOWEL:Unremarkable.

## 2020-01-18 LAB
ANION GAP SERPL CALCULATED.3IONS-SCNC: 12 MMOL/L (ref 7–16)
BUN BLDV-MCNC: 19 MG/DL (ref 6–20)
CALCIUM SERPL-MCNC: 9 MG/DL (ref 8.6–10.2)
CHLORIDE BLD-SCNC: 100 MMOL/L (ref 98–107)
CO2: 23 MMOL/L (ref 22–29)
CREAT SERPL-MCNC: 1 MG/DL (ref 0.7–1.2)
GFR AFRICAN AMERICAN: >60
GFR NON-AFRICAN AMERICAN: >60 ML/MIN/1.73
GLUCOSE BLD-MCNC: 338 MG/DL (ref 74–99)
HCT VFR BLD CALC: 40.6 % (ref 37–54)
HEMOGLOBIN: 13.4 G/DL (ref 12.5–16.5)
LACTIC ACID: 2.1 MMOL/L (ref 0.5–2.2)
MCH RBC QN AUTO: 29.8 PG (ref 26–35)
MCHC RBC AUTO-ENTMCNC: 33 % (ref 32–34.5)
MCV RBC AUTO: 90.2 FL (ref 80–99.9)
METER GLUCOSE: 200 MG/DL (ref 74–99)
METER GLUCOSE: 207 MG/DL (ref 74–99)
PDW BLD-RTO: 13.2 FL (ref 11.5–15)
PLATELET # BLD: 189 E9/L (ref 130–450)
PMV BLD AUTO: 9.9 FL (ref 7–12)
POTASSIUM REFLEX MAGNESIUM: 4.8 MMOL/L (ref 3.5–5)
RBC # BLD: 4.5 E12/L (ref 3.8–5.8)
SODIUM BLD-SCNC: 135 MMOL/L (ref 132–146)
WBC # BLD: 11.4 E9/L (ref 4.5–11.5)

## 2020-01-18 PROCEDURE — 6360000002 HC RX W HCPCS: Performed by: SURGERY

## 2020-01-18 PROCEDURE — 2580000003 HC RX 258: Performed by: SURGERY

## 2020-01-18 PROCEDURE — 85027 COMPLETE CBC AUTOMATED: CPT

## 2020-01-18 PROCEDURE — 2500000003 HC RX 250 WO HCPCS: Performed by: SURGERY

## 2020-01-18 PROCEDURE — 36415 COLL VENOUS BLD VENIPUNCTURE: CPT

## 2020-01-18 PROCEDURE — 82962 GLUCOSE BLOOD TEST: CPT

## 2020-01-18 PROCEDURE — 83605 ASSAY OF LACTIC ACID: CPT

## 2020-01-18 PROCEDURE — 2700000000 HC OXYGEN THERAPY PER DAY

## 2020-01-18 PROCEDURE — 80048 BASIC METABOLIC PNL TOTAL CA: CPT

## 2020-01-18 PROCEDURE — 35840 EXPLORE ABDOMINAL VESSELS: CPT | Performed by: SURGERY

## 2020-01-18 PROCEDURE — 2060000000 HC ICU INTERMEDIATE R&B

## 2020-01-18 RX ADMIN — SODIUM CHLORIDE: 9 INJECTION, SOLUTION INTRAVENOUS at 16:58

## 2020-01-18 RX ADMIN — SODIUM CHLORIDE, PRESERVATIVE FREE 10 ML: 5 INJECTION INTRAVENOUS at 22:14

## 2020-01-18 RX ADMIN — HYDROMORPHONE HYDROCHLORIDE 0.5 MG: 1 INJECTION, SOLUTION INTRAMUSCULAR; INTRAVENOUS; SUBCUTANEOUS at 16:49

## 2020-01-18 RX ADMIN — HYDROMORPHONE HYDROCHLORIDE 0.5 MG: 1 INJECTION, SOLUTION INTRAMUSCULAR; INTRAVENOUS; SUBCUTANEOUS at 13:31

## 2020-01-18 RX ADMIN — Medication 10 ML: at 10:07

## 2020-01-18 RX ADMIN — SODIUM CHLORIDE: 9 INJECTION, SOLUTION INTRAVENOUS at 09:16

## 2020-01-18 RX ADMIN — HYDROMORPHONE HYDROCHLORIDE 0.5 MG: 1 INJECTION, SOLUTION INTRAMUSCULAR; INTRAVENOUS; SUBCUTANEOUS at 06:25

## 2020-01-18 RX ADMIN — FAMOTIDINE 20 MG: 10 INJECTION INTRAVENOUS at 22:09

## 2020-01-18 RX ADMIN — Medication 10 ML: at 15:23

## 2020-01-18 RX ADMIN — METRONIDAZOLE 500 MG: 500 INJECTION, SOLUTION INTRAVENOUS at 09:16

## 2020-01-18 RX ADMIN — HYDROMORPHONE HYDROCHLORIDE 0.5 MG: 1 INJECTION, SOLUTION INTRAMUSCULAR; INTRAVENOUS; SUBCUTANEOUS at 01:58

## 2020-01-18 RX ADMIN — ENOXAPARIN SODIUM 40 MG: 40 INJECTION SUBCUTANEOUS at 09:17

## 2020-01-18 RX ADMIN — DEXTROSE MONOHYDRATE 2 G: 50 INJECTION, SOLUTION INTRAVENOUS at 13:30

## 2020-01-18 RX ADMIN — FAMOTIDINE 20 MG: 10 INJECTION INTRAVENOUS at 01:18

## 2020-01-18 RX ADMIN — Medication 10 ML: at 13:31

## 2020-01-18 RX ADMIN — DEXTROSE MONOHYDRATE 2 G: 50 INJECTION, SOLUTION INTRAVENOUS at 01:18

## 2020-01-18 RX ADMIN — DEXTROSE MONOHYDRATE 2 G: 50 INJECTION, SOLUTION INTRAVENOUS at 06:25

## 2020-01-18 RX ADMIN — Medication 10 ML: at 16:50

## 2020-01-18 RX ADMIN — MORPHINE SULFATE 4 MG: 4 INJECTION, SOLUTION INTRAMUSCULAR; INTRAVENOUS at 09:13

## 2020-01-18 RX ADMIN — MORPHINE SULFATE 4 MG: 4 INJECTION, SOLUTION INTRAMUSCULAR; INTRAVENOUS at 04:31

## 2020-01-18 RX ADMIN — HYDROMORPHONE HYDROCHLORIDE 0.5 MG: 1 INJECTION, SOLUTION INTRAMUSCULAR; INTRAVENOUS; SUBCUTANEOUS at 10:07

## 2020-01-18 RX ADMIN — METRONIDAZOLE 500 MG: 500 INJECTION, SOLUTION INTRAVENOUS at 16:49

## 2020-01-18 RX ADMIN — MORPHINE SULFATE 4 MG: 4 INJECTION, SOLUTION INTRAMUSCULAR; INTRAVENOUS at 15:22

## 2020-01-18 RX ADMIN — MORPHINE SULFATE 4 MG: 4 INJECTION, SOLUTION INTRAMUSCULAR; INTRAVENOUS at 22:50

## 2020-01-18 RX ADMIN — MORPHINE SULFATE 4 MG: 4 INJECTION, SOLUTION INTRAMUSCULAR; INTRAVENOUS at 18:47

## 2020-01-18 RX ADMIN — MORPHINE SULFATE 4 MG: 4 INJECTION, SOLUTION INTRAMUSCULAR; INTRAVENOUS at 00:03

## 2020-01-18 RX ADMIN — SODIUM CHLORIDE: 9 INJECTION, SOLUTION INTRAVENOUS at 00:10

## 2020-01-18 RX ADMIN — METRONIDAZOLE 500 MG: 500 INJECTION, SOLUTION INTRAVENOUS at 00:38

## 2020-01-18 RX ADMIN — FAMOTIDINE 20 MG: 10 INJECTION INTRAVENOUS at 09:17

## 2020-01-18 RX ADMIN — HYDROMORPHONE HYDROCHLORIDE 0.5 MG: 1 INJECTION, SOLUTION INTRAMUSCULAR; INTRAVENOUS; SUBCUTANEOUS at 20:10

## 2020-01-18 RX ADMIN — Medication 10 ML: at 00:05

## 2020-01-18 ASSESSMENT — PAIN SCALES - GENERAL
PAINLEVEL_OUTOF10: 8
PAINLEVEL_OUTOF10: 7
PAINLEVEL_OUTOF10: 9
PAINLEVEL_OUTOF10: 9
PAINLEVEL_OUTOF10: 8
PAINLEVEL_OUTOF10: 10
PAINLEVEL_OUTOF10: 5
PAINLEVEL_OUTOF10: 7
PAINLEVEL_OUTOF10: 8
PAINLEVEL_OUTOF10: 10
PAINLEVEL_OUTOF10: 10
PAINLEVEL_OUTOF10: 4
PAINLEVEL_OUTOF10: 10
PAINLEVEL_OUTOF10: 10
PAINLEVEL_OUTOF10: 9
PAINLEVEL_OUTOF10: 9

## 2020-01-18 ASSESSMENT — PAIN DESCRIPTION - FREQUENCY
FREQUENCY: CONTINUOUS

## 2020-01-18 ASSESSMENT — PAIN DESCRIPTION - DESCRIPTORS
DESCRIPTORS: DISCOMFORT;SORE;TENDER
DESCRIPTORS: ACHING;DISCOMFORT;SORE;TENDER
DESCRIPTORS: ACHING;DISCOMFORT;SORE;TENDER

## 2020-01-18 ASSESSMENT — PAIN DESCRIPTION - PAIN TYPE
TYPE: SURGICAL PAIN

## 2020-01-18 ASSESSMENT — PAIN DESCRIPTION - LOCATION
LOCATION: ABDOMEN

## 2020-01-18 ASSESSMENT — PAIN DESCRIPTION - ORIENTATION
ORIENTATION: MID

## 2020-01-18 NOTE — CONSULTS
Vascular Surgery Inpatient Consultation Note      Reason for Consultation:  Mesenteric hematoma. HISTORY OF PRESENT ILLNESS:                The patient is a 64 y.o. male who is admitted to the hospital for treatment of abdominal pain localized to RLQ. CT scan showed thickening of appendix. Pt underwent laparoscopic appendectomy and mesenteric hematoma was identified. Pt converted to laparotomy. Vascular surgery is consulted intra-operative for evaluation and treatment. IMPRESSION:  Mesenteric hematoma. RECOMMENDATIONS:  I scrubbed into surgery to evaluate findings. Patient Active Problem List   Diagnosis Code    Appendicitis K37    Mesenteric hematoma S36.892A       Past Medical History:   Diagnosis Date    Diabetes mellitus ([facility] [address]     Hypertension         Past Surgical History:   Procedure Laterality Date    COLONOSCOPY  11/04/2019    Tubular adenoma; diverticulosis       Current Medications:    sodium chloride 125 mL/hr at 01/17/20 1815      sodium chloride flush, acetaminophen, morphine **OR** morphine, oxyCODONE-acetaminophen **OR** oxyCODONE-acetaminophen, HYDROmorphone, HYDROmorphone, promethazine    sodium chloride flush  10 mL Intravenous 2 times per day    enoxaparin  40 mg Subcutaneous Daily    [START ON 1/18/2020] metroNIDAZOLE  500 mg Intravenous Q8H    [START ON 1/18/2020] cefTRIAXone (ROCEPHIN) IV  1 g Intravenous Daily        Allergies:  Patient has no known allergies.     Social History     Socioeconomic History    Marital status:      Spouse name: Not on file    Number of children: Not on file    Years of education: Not on file    Highest education level: Not on file   Occupational History    Not on file   Social Needs    Financial resource strain: Not on file    Food insecurity:     Worry: Not on file     Inability: Not on file    Transportation needs:     Medical: Not on file     Non-medical: Not on file   Tobacco Use    Smoking status: Never Smoker    Numbness:   No [x]/Yes []      Paralysis:   No [x]/Yes []                       Headaches:   No [x]/Yes []  Hematologic, lymphatic:   Anemia:   No [x]/Yes []              Bleeding or bruising:  No [x]/Yes []              Fevers or chills: No [x]/Yes []  Endocrine:             Temp intolerance:   No [x]/Yes []                       Polydipsia, polyuria:  No [x]/Yes []  Skin:              Rash:    No [x]/Yes []      Ulcers:   No [x]/Yes []              Abnorm pigment: No [x]/Yes []  :              Frequency/urgency:  No [x]/Yes []      Hematuria:    No [x]/Yes []                      Incontinence:    No [x]/Yes []    PHYSICAL EXAM:  Vitals:    01/17/20 1745   BP: (!) 164/102   Pulse: 97   Resp: 16   Temp: 98.4 °F (36.9 °C)   SpO2: 97%     General Appearance: intubated, general anesthesia  Abdomen open. PULSE EXAM      Right      Left   Brachial     Radial     Femoral     Popliteal     Dorsalis Pedis     Posterior Tibial     (3=normal, 2=diminished, 1=barely palpable, 4=widened)      LABS:    Lab Results   Component Value Date    WBC 8.3 01/17/2020    HGB 13.6 01/17/2020    HCT 39.8 01/17/2020     01/17/2020    K 4.0 01/17/2020    BUN 16 01/17/2020    CREATININE 0.9 01/17/2020       RADIOLOGY:    CT reviewed. No adenopathy or vascular abnormalities.

## 2020-01-18 NOTE — PLAN OF CARE
Problem: Falls - Risk of:  Goal: Will remain free from falls  Description  Will remain free from falls  Outcome: Met This Shift     Problem: Cardiac:  Goal: Hemodynamic stability will improve  Description  Hemodynamic stability will improve  Outcome: Met This Shift

## 2020-01-18 NOTE — PROGRESS NOTES
DATE OF PROCEDURE: 1/17/2020    SURGEON: Dr. Margaret Ortiz: none    PREOPERATIVE DIAGNOSES:  Acute appendicits    POSTOPERATIVE DIAGNOSES: same, mesenteric hematoma     OPERATION: laparoscopic appendectomy                         Open exploratory    ANESTHESIA: GET    EBL: 726 cc    COMPLICATIONS: None.      Please see dictation    Intraoperative consultation for Vascular Surgery, Dr Leigh Ann Cameron 1/17/2020 9:52 PM

## 2020-01-18 NOTE — PROGRESS NOTES
Department of Surgery   General Surgery  Dr. Aggie Snowden Progress Note      SUBJECTIVE:  Pt with complaint of pain especially when he takes a deep breath. No nausea/emesis. He want everything removed- NG/Sullivan/IV. No hemodynamic problems overnight. Family present including mother,father, brother and others. SMI use poor (<500)  Presently due to pain per patient    OBJECTIVE      Physical    VITALS:  BP (!) 166/108   Pulse 101   Temp 98.3 °F (36.8 °C) (Oral)   Resp 20   Ht 5' 6\" (1.676 m)   Wt 226 lb 1 oz (102.5 kg)   SpO2 94%   BMI 36.49 kg/m²   INTAKE/OUTPUT:      Intake/Output Summary (Last 24 hours) at 2020 1156  Last data filed at 2020 0546  Gross per 24 hour   Intake 2000 ml   Output 1000 ml   Net 1000 ml     URINARY CATHETER OUTPUT (Sullivan):   [  DRAIN/TUBE OUTPUT:   [  TEMPERATURE:  Current - Temp: 98.3 °F (36.8 °C); Max - Temp  Av.3 °F (36.3 °C)  Min: 96.6 °F (35.9 °C)  Max: 98.8 °F (37.1 °C)  RESPIRATIONS RANGE: Resp  Av  Min: 14  Max: 22  PULSE RANGE: Pulse  Av.8  Min: 97  Max: 111  BLOOD PRESSURE RANGE:  Systolic (16PXY), PXU:132 , Min:87 , VZJ:525   ; Diastolic (18ZCN), VMB:76, Min:50, Max:110    PULSE OXIMETRY RANGE: SpO2  Av.5 %  Min: 78 %  Max: 100 %    General Appearance:  awake, alert, oriented, in no acute distress  Skin:  Skin color, texture, turgor normal. No rashes or lesions. Back:  mild pain to palpation in the LS spine midline  Lungs:  Normal expansion. Clear to auscultation. No rales, rhonchi, or wheezing. Heart:  Heart sounds are normal.  Regular rate and rhythm without murmur, gallop or rub. Abdomen:  Soft, non-tender, normal bowel sounds. No bruits, organomegaly or masses. Extremities: Extremities warm to touch, pink, with no edema.   Musculoskeletal:  negative  Peripheral Pulses:  Capillary refill <2secs, strong peripheral pulses  Neurologic:  negative    Data  CBC with Differential:    Lab Results   Component Value Date    WBC 11.4 Medications: sodium chloride flush 0.9 % injection 10 mL, 10 mL, Intravenous, PRN  0.9 % sodium chloride infusion, , Intravenous, Continuous  metronidazole (FLAGYL) 500 mg in NaCl 100 mL IVPB premix, 500 mg, Intravenous, Q8H  acetaminophen (TYLENOL) tablet 650 mg, 650 mg, Oral, Q4H PRN  morphine (PF) injection 2 mg, 2 mg, Intravenous, Q4H PRN **OR** morphine sulfate (PF) injection 4 mg, 4 mg, Intravenous, Q4H PRN  oxyCODONE-acetaminophen (PERCOCET) 5-325 MG per tablet 1 tablet, 1 tablet, Oral, Q4H PRN **OR** oxyCODONE-acetaminophen (PERCOCET) 5-325 MG per tablet 2 tablet, 2 tablet, Oral, Q4H PRN  sodium chloride flush 0.9 % injection 10 mL, 10 mL, Intravenous, 2 times per day  ondansetron (ZOFRAN) injection 4 mg, 4 mg, Intravenous, Q6H PRN  enoxaparin (LOVENOX) injection 40 mg, 40 mg, Subcutaneous, Daily  cefOXitin (MEFOXIN) 2 g in dextrose 5% 50 mL (mini-bag), 2 g, Intravenous, Q6H  HYDROmorphone (DILAUDID) injection 0.5 mg, 0.5 mg, Intravenous, Q3H PRN  famotidine (PEPCID) injection 20 mg, 20 mg, Intravenous, BID    ASSESSMENT AND PLAN    64 y.o. male status post exploratory laparotomy/hematoma/appendix post op day #  1;   See orders      Austin Vaca 0/89/852028:05 AM

## 2020-01-18 NOTE — PROGRESS NOTES
Patient awake, follows commands, has pain to abdomen, NG tube to LIS with scant bile colored drainage noted

## 2020-01-19 LAB
ANION GAP SERPL CALCULATED.3IONS-SCNC: 12 MMOL/L (ref 7–16)
BUN BLDV-MCNC: 17 MG/DL (ref 6–20)
CALCIUM SERPL-MCNC: 9 MG/DL (ref 8.6–10.2)
CHLORIDE BLD-SCNC: 101 MMOL/L (ref 98–107)
CO2: 25 MMOL/L (ref 22–29)
CREAT SERPL-MCNC: 0.9 MG/DL (ref 0.7–1.2)
GFR AFRICAN AMERICAN: >60
GFR NON-AFRICAN AMERICAN: >60 ML/MIN/1.73
GLUCOSE BLD-MCNC: 267 MG/DL (ref 74–99)
MRSA CULTURE ONLY: NORMAL
POTASSIUM REFLEX MAGNESIUM: 3.9 MMOL/L (ref 3.5–5)
SODIUM BLD-SCNC: 138 MMOL/L (ref 132–146)

## 2020-01-19 PROCEDURE — 6360000002 HC RX W HCPCS: Performed by: SURGERY

## 2020-01-19 PROCEDURE — 36415 COLL VENOUS BLD VENIPUNCTURE: CPT

## 2020-01-19 PROCEDURE — 80048 BASIC METABOLIC PNL TOTAL CA: CPT

## 2020-01-19 PROCEDURE — 2580000003 HC RX 258: Performed by: SURGERY

## 2020-01-19 PROCEDURE — 2500000003 HC RX 250 WO HCPCS: Performed by: SURGERY

## 2020-01-19 PROCEDURE — 2700000000 HC OXYGEN THERAPY PER DAY

## 2020-01-19 PROCEDURE — 2060000000 HC ICU INTERMEDIATE R&B

## 2020-01-19 RX ADMIN — SODIUM CHLORIDE: 9 INJECTION, SOLUTION INTRAVENOUS at 18:47

## 2020-01-19 RX ADMIN — HYDROMORPHONE HYDROCHLORIDE 0.5 MG: 1 INJECTION, SOLUTION INTRAMUSCULAR; INTRAVENOUS; SUBCUTANEOUS at 07:58

## 2020-01-19 RX ADMIN — METRONIDAZOLE 500 MG: 500 INJECTION, SOLUTION INTRAVENOUS at 00:29

## 2020-01-19 RX ADMIN — METRONIDAZOLE 500 MG: 500 INJECTION, SOLUTION INTRAVENOUS at 07:58

## 2020-01-19 RX ADMIN — HYDROMORPHONE HYDROCHLORIDE 0.5 MG: 1 INJECTION, SOLUTION INTRAMUSCULAR; INTRAVENOUS; SUBCUTANEOUS at 00:01

## 2020-01-19 RX ADMIN — SODIUM CHLORIDE, PRESERVATIVE FREE 10 ML: 5 INJECTION INTRAVENOUS at 22:25

## 2020-01-19 RX ADMIN — Medication 10 ML: at 18:44

## 2020-01-19 RX ADMIN — SODIUM CHLORIDE, PRESERVATIVE FREE 10 ML: 5 INJECTION INTRAVENOUS at 07:58

## 2020-01-19 RX ADMIN — ENOXAPARIN SODIUM 40 MG: 40 INJECTION SUBCUTANEOUS at 07:58

## 2020-01-19 RX ADMIN — MORPHINE SULFATE 4 MG: 4 INJECTION, SOLUTION INTRAMUSCULAR; INTRAVENOUS at 02:00

## 2020-01-19 RX ADMIN — HYDROMORPHONE HYDROCHLORIDE 0.5 MG: 1 INJECTION, SOLUTION INTRAMUSCULAR; INTRAVENOUS; SUBCUTANEOUS at 15:32

## 2020-01-19 RX ADMIN — HYDROMORPHONE HYDROCHLORIDE 0.5 MG: 1 INJECTION, SOLUTION INTRAMUSCULAR; INTRAVENOUS; SUBCUTANEOUS at 11:00

## 2020-01-19 RX ADMIN — HYDROMORPHONE HYDROCHLORIDE 0.5 MG: 1 INJECTION, SOLUTION INTRAMUSCULAR; INTRAVENOUS; SUBCUTANEOUS at 04:15

## 2020-01-19 RX ADMIN — METRONIDAZOLE 500 MG: 500 INJECTION, SOLUTION INTRAVENOUS at 15:32

## 2020-01-19 RX ADMIN — FAMOTIDINE 20 MG: 10 INJECTION INTRAVENOUS at 22:24

## 2020-01-19 RX ADMIN — Medication 10 ML: at 15:32

## 2020-01-19 RX ADMIN — SODIUM CHLORIDE: 9 INJECTION, SOLUTION INTRAVENOUS at 11:00

## 2020-01-19 RX ADMIN — HYDROMORPHONE HYDROCHLORIDE 0.5 MG: 1 INJECTION, SOLUTION INTRAMUSCULAR; INTRAVENOUS; SUBCUTANEOUS at 22:24

## 2020-01-19 RX ADMIN — FAMOTIDINE 20 MG: 10 INJECTION INTRAVENOUS at 07:58

## 2020-01-19 RX ADMIN — HYDROMORPHONE HYDROCHLORIDE 0.5 MG: 1 INJECTION, SOLUTION INTRAMUSCULAR; INTRAVENOUS; SUBCUTANEOUS at 18:44

## 2020-01-19 ASSESSMENT — PAIN DESCRIPTION - ORIENTATION
ORIENTATION: MID

## 2020-01-19 ASSESSMENT — PAIN SCALES - GENERAL
PAINLEVEL_OUTOF10: 0
PAINLEVEL_OUTOF10: 6
PAINLEVEL_OUTOF10: 8
PAINLEVEL_OUTOF10: 9
PAINLEVEL_OUTOF10: 7
PAINLEVEL_OUTOF10: 6
PAINLEVEL_OUTOF10: 10
PAINLEVEL_OUTOF10: 8
PAINLEVEL_OUTOF10: 7
PAINLEVEL_OUTOF10: 8

## 2020-01-19 ASSESSMENT — PAIN DESCRIPTION - DESCRIPTORS
DESCRIPTORS: CONSTANT;SORE;TENDER

## 2020-01-19 ASSESSMENT — PAIN DESCRIPTION - ONSET: ONSET: ON-GOING

## 2020-01-19 ASSESSMENT — PAIN DESCRIPTION - LOCATION
LOCATION: ABDOMEN

## 2020-01-19 ASSESSMENT — PAIN DESCRIPTION - PAIN TYPE
TYPE: SURGICAL PAIN

## 2020-01-19 ASSESSMENT — PAIN - FUNCTIONAL ASSESSMENT
PAIN_FUNCTIONAL_ASSESSMENT: PREVENTS OR INTERFERES SOME ACTIVE ACTIVITIES AND ADLS

## 2020-01-19 ASSESSMENT — PAIN DESCRIPTION - FREQUENCY
FREQUENCY: CONTINUOUS

## 2020-01-19 ASSESSMENT — PAIN DESCRIPTION - PROGRESSION: CLINICAL_PROGRESSION: NOT CHANGED

## 2020-01-19 NOTE — PROGRESS NOTES
Department of Surgery   General Surgery  Dr. Patti Bustos Progress Note      SUBJECTIVE:  Pt is unable to sleep due to pain; He has received the dilaudid and morphine; He believes his pain should be gone by now;  I have discussed his pain control with him and his brother. He is POD 2 from an exploratory laparotomy and should still have some pain. We discussed the purpose of pain medication is to give him some control over the pain, not take it completely away. His SMI performance is very poor, I addressed this with the patient and brother also. He is belching quite a bit, but wants \"real food\" and is having no flatus. OBJECTIVE      Physical    VITALS:  BP (!) 195/105   Pulse 109   Temp 98.2 °F (36.8 °C) (Oral)   Resp 20   Ht 5' 6\" (1.676 m)   Wt 225 lb (102.1 kg)   SpO2 90%   BMI 36.32 kg/m²   INTAKE/OUTPUT:      Intake/Output Summary (Last 24 hours) at 2020 1009  Last data filed at 2020 0758  Gross per 24 hour   Intake 2478.96 ml   Output 600 ml   Net 1878.96 ml     URINARY CATHETER OUTPUT ([place]):   [  DRAIN/TUBE OUTPUT:   [  TEMPERATURE:  Current - Temp: 98.2 °F (36.8 °C); Max - Temp  Av.3 °F (36.8 °C)  Min: 98.1 °F (36.7 °C)  Max: 98.7 °F (37.1 °C)  RESPIRATIONS RANGE: Resp  Av  Min: 20  Max: 20  PULSE RANGE: Pulse  Av.5  Min: 105  Max: 114  BLOOD PRESSURE RANGE:  Systolic (30PLV), WWW:332 , Min:159 , ANW:246   ; Diastolic (14HLM), LNA:929, Min:88, Max:105    PULSE OXIMETRY RANGE: SpO2  Av.3 %  Min: 90 %  Max: 96 %    General Appearance:  awake, alert, oriented, in no acute distress  Skin:  Skin color, texture, turgor normal. No rashes or lesions. Back:  no pain to palpation  Lungs:  poor expansion. Clear to auscultation. No rales, rhonchi, or wheezing. Heart:  Heart sounds are normal.  Regular rate and rhythm without murmur, gallop or rub. Abdomen:  Soft, non-tender, hypoactive bowel sounds. No bruits, organomegaly or masses.                      Wound is clean, dry and intact; Extremities: Extremities warm to touch, pink, with no edema.   Musculoskeletal:  negative  Peripheral Pulses:  Capillary refill <2secs, strong peripheral pulses  Neurologic:  negative    Data  CBC with Differential:    Lab Results   Component Value Date    WBC 11.4 01/18/2020    RBC 4.50 01/18/2020    HGB 13.4 01/18/2020    HCT 40.6 01/18/2020     01/18/2020    MCV 90.2 01/18/2020    MCH 29.8 01/18/2020    MCHC 33.0 01/18/2020    RDW 13.2 01/18/2020    LYMPHOPCT 14.1 01/17/2020    MONOPCT 8.7 01/17/2020    BASOPCT 0.6 01/17/2020    MONOSABS 0.72 01/17/2020    LYMPHSABS 1.17 01/17/2020    EOSABS 0.13 01/17/2020    BASOSABS 0.05 01/17/2020     CMP:    Lab Results   Component Value Date     01/19/2020    K 3.9 01/19/2020     01/19/2020    CO2 25 01/19/2020    BUN 17 01/19/2020    CREATININE 0.9 01/19/2020    GFRAA >60 01/19/2020    LABGLOM >60 01/19/2020    GLUCOSE 267 01/19/2020    PROT 7.4 01/17/2020    LABALBU 4.6 01/17/2020    CALCIUM 9.0 01/19/2020    BILITOT 0.5 01/17/2020    ALKPHOS 71 01/17/2020    AST 45 01/17/2020    ALT 83 01/17/2020     BMP:  Hepatic Function Panel:  Ionized Calcium:  No results found for: IONCA  Magnesium:  No results found for: MG  Phosphorus:  No results found for: PHOS  LDH:  No results found for: LDH  Uric Acid:  No components found for: URIC  PT/INR:  No results found for: PROTIME, INR  Warfarin PT/INR:  No components found for: PTPATWAR, PTINRWAR  PTT:  No results found for: APTT[APTT  Troponin:  No results found for: TROPONINI  Last 3 Troponin:  No results found for: TROPONINI  Urine Culture:  No components found for: CURINE  Blood Culture:  No components found for: CBLOOD, CFUNGUSBL  Blood Culture from [facility]:  No components found for: CBLOODLN  Stool Culture:  No components found for: CSTOOL  Sputum Culture:  No components found for: CSPUTUM  Sputum Culture for [employer]:  No components found for: CAFBSM  Throat Culture:  No components

## 2020-01-19 NOTE — PROGRESS NOTES
Vascular Surgery Progress Note    CC: f/u laparotomy    HISTORY:  The patient is awake, alert, and oriented. States he feels gas pressure but no flatus. IMPRESSION:  POD # 2 s/p laparotomy, appendectomy. PLAN:  Stable from vascular standpoint. F/u one month in office. Patient Active Problem List   Diagnosis Code    Appendicitis K37    Mesenteric hematoma S36.892A       Current Medications:    sodium chloride 125 mL/hr at 01/19/20 1100      sodium chloride flush, acetaminophen, morphine **OR** morphine, oxyCODONE-acetaminophen **OR** oxyCODONE-acetaminophen, ondansetron, HYDROmorphone    metroNIDAZOLE  500 mg Intravenous Q8H    sodium chloride flush  10 mL Intravenous 2 times per day    enoxaparin  40 mg Subcutaneous Daily    famotidine (PEPCID) injection  20 mg Intravenous BID          PHYSICAL EXAM:    Vitals:    01/19/20 0758   BP: (!) 195/105   Pulse: 109   Resp: 20   Temp: 98.2 °F (36.8 °C)   SpO2: 90%     CONSTITUTIONAL:  awake, alert, cooperative, no apparent distress  LUNGS:  no increased work of breathing, good air exchange and   CARDIOVASCULAR:  regular rate and rhythm and   ABDOMEN:  soft, distended, tympanic  No leg swelling.     LABS:    Lab Results   Component Value Date    WBC 11.4 01/18/2020    HGB 13.4 01/18/2020    HCT 40.6 01/18/2020     01/18/2020    K 3.9 01/19/2020    BUN 17 01/19/2020    CREATININE 0.9 01/19/2020       RADIOLOGY:

## 2020-01-20 ENCOUNTER — TELEPHONE (OUTPATIENT)
Dept: VASCULAR SURGERY | Age: 60
End: 2020-01-20

## 2020-01-20 LAB
ANION GAP SERPL CALCULATED.3IONS-SCNC: 12 MMOL/L (ref 7–16)
BUN BLDV-MCNC: 16 MG/DL (ref 6–20)
CALCIUM SERPL-MCNC: 9.4 MG/DL (ref 8.6–10.2)
CHLORIDE BLD-SCNC: 107 MMOL/L (ref 98–107)
CO2: 23 MMOL/L (ref 22–29)
CREAT SERPL-MCNC: 0.8 MG/DL (ref 0.7–1.2)
GFR AFRICAN AMERICAN: >60
GFR NON-AFRICAN AMERICAN: >60 ML/MIN/1.73
GLUCOSE BLD-MCNC: 214 MG/DL (ref 74–99)
METER GLUCOSE: 215 MG/DL (ref 74–99)
METER GLUCOSE: 224 MG/DL (ref 74–99)
METER GLUCOSE: 237 MG/DL (ref 74–99)
POTASSIUM REFLEX MAGNESIUM: 4.1 MMOL/L (ref 3.5–5)
SODIUM BLD-SCNC: 142 MMOL/L (ref 132–146)

## 2020-01-20 PROCEDURE — 80048 BASIC METABOLIC PNL TOTAL CA: CPT

## 2020-01-20 PROCEDURE — 6360000002 HC RX W HCPCS: Performed by: SURGERY

## 2020-01-20 PROCEDURE — 2500000003 HC RX 250 WO HCPCS: Performed by: SURGERY

## 2020-01-20 PROCEDURE — 2500000003 HC RX 250 WO HCPCS: Performed by: STUDENT IN AN ORGANIZED HEALTH CARE EDUCATION/TRAINING PROGRAM

## 2020-01-20 PROCEDURE — 2060000000 HC ICU INTERMEDIATE R&B

## 2020-01-20 PROCEDURE — 2580000003 HC RX 258: Performed by: SURGERY

## 2020-01-20 PROCEDURE — 82962 GLUCOSE BLOOD TEST: CPT

## 2020-01-20 PROCEDURE — 36415 COLL VENOUS BLD VENIPUNCTURE: CPT

## 2020-01-20 PROCEDURE — 6370000000 HC RX 637 (ALT 250 FOR IP): Performed by: SURGERY

## 2020-01-20 RX ORDER — SERTRALINE HYDROCHLORIDE 100 MG/1
100 TABLET, FILM COATED ORAL DAILY
Status: DISCONTINUED | OUTPATIENT
Start: 2020-01-20 | End: 2020-01-23 | Stop reason: HOSPADM

## 2020-01-20 RX ORDER — PRAVASTATIN SODIUM 20 MG
40 TABLET ORAL DAILY
Status: DISCONTINUED | OUTPATIENT
Start: 2020-01-20 | End: 2020-01-23 | Stop reason: HOSPADM

## 2020-01-20 RX ORDER — LOSARTAN POTASSIUM 50 MG/1
100 TABLET ORAL DAILY
Status: DISCONTINUED | OUTPATIENT
Start: 2020-01-20 | End: 2020-01-23 | Stop reason: HOSPADM

## 2020-01-20 RX ORDER — HYDRALAZINE HYDROCHLORIDE 20 MG/ML
10 INJECTION INTRAMUSCULAR; INTRAVENOUS
Status: DISCONTINUED | OUTPATIENT
Start: 2020-01-20 | End: 2020-01-22

## 2020-01-20 RX ORDER — METOPROLOL SUCCINATE 50 MG/1
50 TABLET, EXTENDED RELEASE ORAL DAILY
Status: DISCONTINUED | OUTPATIENT
Start: 2020-01-20 | End: 2020-01-22

## 2020-01-20 RX ORDER — DEXTROSE, SODIUM CHLORIDE, AND POTASSIUM CHLORIDE 5; .45; .15 G/100ML; G/100ML; G/100ML
INJECTION INTRAVENOUS CONTINUOUS
Status: DISCONTINUED | OUTPATIENT
Start: 2020-01-20 | End: 2020-01-23

## 2020-01-20 RX ORDER — LABETALOL HYDROCHLORIDE 5 MG/ML
10 INJECTION, SOLUTION INTRAVENOUS
Status: DISCONTINUED | OUTPATIENT
Start: 2020-01-20 | End: 2020-01-22

## 2020-01-20 RX ORDER — FENOFIBRATE 160 MG/1
160 TABLET ORAL DAILY
Status: DISCONTINUED | OUTPATIENT
Start: 2020-01-20 | End: 2020-01-23 | Stop reason: HOSPADM

## 2020-01-20 RX ORDER — ALPRAZOLAM 0.25 MG/1
0.25 TABLET ORAL 2 TIMES DAILY PRN
Status: DISCONTINUED | OUTPATIENT
Start: 2020-01-20 | End: 2020-01-23 | Stop reason: HOSPADM

## 2020-01-20 RX ADMIN — FAMOTIDINE 20 MG: 10 INJECTION INTRAVENOUS at 20:40

## 2020-01-20 RX ADMIN — INSULIN LISPRO 2 UNITS: 100 INJECTION, SOLUTION INTRAVENOUS; SUBCUTANEOUS at 21:01

## 2020-01-20 RX ADMIN — METRONIDAZOLE 500 MG: 500 INJECTION, SOLUTION INTRAVENOUS at 00:49

## 2020-01-20 RX ADMIN — HYDROMORPHONE HYDROCHLORIDE 0.5 MG: 1 INJECTION, SOLUTION INTRAMUSCULAR; INTRAVENOUS; SUBCUTANEOUS at 08:18

## 2020-01-20 RX ADMIN — METOPROLOL SUCCINATE 50 MG: 50 TABLET, EXTENDED RELEASE ORAL at 13:02

## 2020-01-20 RX ADMIN — SODIUM CHLORIDE, PRESERVATIVE FREE 10 ML: 5 INJECTION INTRAVENOUS at 08:18

## 2020-01-20 RX ADMIN — SODIUM CHLORIDE, PRESERVATIVE FREE 10 ML: 5 INJECTION INTRAVENOUS at 22:17

## 2020-01-20 RX ADMIN — FAMOTIDINE 20 MG: 10 INJECTION INTRAVENOUS at 08:17

## 2020-01-20 RX ADMIN — HYDRALAZINE HYDROCHLORIDE 10 MG: 20 INJECTION INTRAMUSCULAR; INTRAVENOUS at 21:15

## 2020-01-20 RX ADMIN — OXYCODONE HYDROCHLORIDE AND ACETAMINOPHEN 2 TABLET: 5; 325 TABLET ORAL at 10:36

## 2020-01-20 RX ADMIN — LOSARTAN POTASSIUM 100 MG: 50 TABLET, FILM COATED ORAL at 13:02

## 2020-01-20 RX ADMIN — HYDROMORPHONE HYDROCHLORIDE 0.5 MG: 1 INJECTION, SOLUTION INTRAMUSCULAR; INTRAVENOUS; SUBCUTANEOUS at 13:16

## 2020-01-20 RX ADMIN — HYDROMORPHONE HYDROCHLORIDE 0.5 MG: 1 INJECTION, SOLUTION INTRAMUSCULAR; INTRAVENOUS; SUBCUTANEOUS at 20:42

## 2020-01-20 RX ADMIN — LABETALOL HYDROCHLORIDE 10 MG: 5 INJECTION INTRAVENOUS at 17:16

## 2020-01-20 RX ADMIN — POTASSIUM CHLORIDE, DEXTROSE MONOHYDRATE AND SODIUM CHLORIDE: 150; 5; 450 INJECTION, SOLUTION INTRAVENOUS at 20:39

## 2020-01-20 RX ADMIN — HYDROMORPHONE HYDROCHLORIDE 0.5 MG: 1 INJECTION, SOLUTION INTRAMUSCULAR; INTRAVENOUS; SUBCUTANEOUS at 17:35

## 2020-01-20 RX ADMIN — FENOFIBRATE 160 MG: 160 TABLET ORAL at 13:02

## 2020-01-20 RX ADMIN — METRONIDAZOLE 500 MG: 500 INJECTION, SOLUTION INTRAVENOUS at 08:18

## 2020-01-20 RX ADMIN — HYDROMORPHONE HYDROCHLORIDE 0.5 MG: 1 INJECTION, SOLUTION INTRAMUSCULAR; INTRAVENOUS; SUBCUTANEOUS at 03:06

## 2020-01-20 RX ADMIN — METRONIDAZOLE 500 MG: 500 INJECTION, SOLUTION INTRAVENOUS at 15:49

## 2020-01-20 RX ADMIN — ENOXAPARIN SODIUM 40 MG: 40 INJECTION SUBCUTANEOUS at 08:17

## 2020-01-20 RX ADMIN — PRAVASTATIN SODIUM 40 MG: 20 TABLET ORAL at 13:02

## 2020-01-20 RX ADMIN — SERTRALINE 100 MG: 100 TABLET, FILM COATED ORAL at 13:02

## 2020-01-20 RX ADMIN — POTASSIUM CHLORIDE, DEXTROSE MONOHYDRATE AND SODIUM CHLORIDE: 150; 5; 450 INJECTION, SOLUTION INTRAVENOUS at 08:01

## 2020-01-20 ASSESSMENT — PAIN - FUNCTIONAL ASSESSMENT
PAIN_FUNCTIONAL_ASSESSMENT: PREVENTS OR INTERFERES SOME ACTIVE ACTIVITIES AND ADLS
PAIN_FUNCTIONAL_ASSESSMENT: PREVENTS OR INTERFERES SOME ACTIVE ACTIVITIES AND ADLS

## 2020-01-20 ASSESSMENT — PAIN DESCRIPTION - LOCATION
LOCATION: ABDOMEN

## 2020-01-20 ASSESSMENT — PAIN DESCRIPTION - ORIENTATION
ORIENTATION: MID;LOWER
ORIENTATION: MID;LOWER
ORIENTATION: MID

## 2020-01-20 ASSESSMENT — PAIN DESCRIPTION - PAIN TYPE
TYPE: ACUTE PAIN;SURGICAL PAIN

## 2020-01-20 ASSESSMENT — PAIN SCALES - GENERAL
PAINLEVEL_OUTOF10: 4
PAINLEVEL_OUTOF10: 4
PAINLEVEL_OUTOF10: 7
PAINLEVEL_OUTOF10: 3
PAINLEVEL_OUTOF10: 7
PAINLEVEL_OUTOF10: 3
PAINLEVEL_OUTOF10: 8

## 2020-01-20 ASSESSMENT — PAIN DESCRIPTION - FREQUENCY
FREQUENCY: CONTINUOUS
FREQUENCY: CONTINUOUS

## 2020-01-20 ASSESSMENT — PAIN DESCRIPTION - ONSET
ONSET: ON-GOING
ONSET: ON-GOING

## 2020-01-20 ASSESSMENT — PAIN DESCRIPTION - DESCRIPTORS
DESCRIPTORS: SORE;TENDER;SHARP
DESCRIPTORS: DISCOMFORT;SORE;TENDER

## 2020-01-20 ASSESSMENT — PAIN DESCRIPTION - PROGRESSION
CLINICAL_PROGRESSION: NOT CHANGED
CLINICAL_PROGRESSION: GRADUALLY WORSENING

## 2020-01-20 NOTE — PROGRESS NOTES
GENERAL SURGERY  DAILY PROGRESS NOTE  1/20/2020    Subjective:  No acute events  Pain controlled  NPO  Denied n/v  -F/-BM    Objective:  BP (!) 165/117   Pulse 109   Temp 98.3 °F (36.8 °C) (Oral)   Resp 18   Ht 5' 6\" (1.676 m)   Wt 225 lb 4.8 oz (102.2 kg)   SpO2 94%   BMI 36.36 kg/m²     General: NAD, awake and alert. Head: Normocephalic, atraumatic  Eyes: PERRLA, EOMI. Lungs: No increased work of breathing. Cardiovascular: RRR. Abdomen: Soft, distended, NT. Incision c/d/i. No rebound, guarding or rigidity.   Extremities: Atraumatic, full range of motion  Skin: Warm, dry and intact    Assessment/Plan:  64 y.o. male with acute appendicitis s/p Laparoscopic appendectomy converted to exploratory laparotomy POD#2    Pain and nausea control PRN  Advance to CLD  mIVF's  OOB/AAT/SMI  DVT ppx w/ lovenox, scd's  Home medications  Blood pressure control PRN      Electronically signed by Leeann Goncalves MD on 1/20/2020 at 3:59 PM

## 2020-01-20 NOTE — CARE COORDINATION
CASE MANAGEMENT. ... Mr Jodi Macdonald and I discussed his hospital stay and discharge needs. Reviewed notes from this weekend and from this morning. Mr Jodi Macdonald is POD # 3 Laparoscopic appendectomy, open exploratory. He continues to receive Ivf's + kcl 100/hr, iv pepcid bid, iv flagyl q8hrs and iv dilaudid prn. He remains NPO. He is up ambulating the halls. But no flatus. Mr Jodi Macdonald lives with his parents and is independent of ADL's. Has no h/o pema/dme/hhc. Follows with Dr Marino Marroquin and uses [facility] in [facility]. He plans to return home at discharge with no needs. Will cont to follow along and assist  accordingly.

## 2020-01-20 NOTE — PROGRESS NOTES
Had extensive conversation with patient and sister re: plan of care, side effects of pain medications, post-op expectation and care, pain management and expectations, need to pass gas, blood sugar and blood pressure control and diet. Reiterated that the RN notified the surgical resident of BP and BS and family concerned re: home medications not ordered. Explained that the RN did notify the resident of this as well as plan for diet. Will update family one more information is available.   Bailey GRANADOS  11:35 AM

## 2020-01-20 NOTE — TELEPHONE ENCOUNTER
----- Message from Mehrdad May MD sent at 1/19/2020  2:16 PM EST -----  Please schedule patient for 1-month post-op office visit.

## 2020-01-21 LAB
ANION GAP SERPL CALCULATED.3IONS-SCNC: 12 MMOL/L (ref 7–16)
BUN BLDV-MCNC: 18 MG/DL (ref 6–20)
CALCIUM SERPL-MCNC: 9.4 MG/DL (ref 8.6–10.2)
CHLORIDE BLD-SCNC: 105 MMOL/L (ref 98–107)
CO2: 23 MMOL/L (ref 22–29)
CREAT SERPL-MCNC: 0.9 MG/DL (ref 0.7–1.2)
GFR AFRICAN AMERICAN: >60
GFR NON-AFRICAN AMERICAN: >60 ML/MIN/1.73
GLUCOSE BLD-MCNC: 262 MG/DL (ref 74–99)
METER GLUCOSE: 147 MG/DL (ref 74–99)
METER GLUCOSE: 223 MG/DL (ref 74–99)
METER GLUCOSE: 230 MG/DL (ref 74–99)
METER GLUCOSE: 238 MG/DL (ref 74–99)
POTASSIUM REFLEX MAGNESIUM: 3.7 MMOL/L (ref 3.5–5)
SODIUM BLD-SCNC: 140 MMOL/L (ref 132–146)

## 2020-01-21 PROCEDURE — 6360000002 HC RX W HCPCS: Performed by: SURGERY

## 2020-01-21 PROCEDURE — 6370000000 HC RX 637 (ALT 250 FOR IP): Performed by: SURGERY

## 2020-01-21 PROCEDURE — 2580000003 HC RX 258: Performed by: SURGERY

## 2020-01-21 PROCEDURE — 2060000000 HC ICU INTERMEDIATE R&B

## 2020-01-21 PROCEDURE — 2500000003 HC RX 250 WO HCPCS: Performed by: STUDENT IN AN ORGANIZED HEALTH CARE EDUCATION/TRAINING PROGRAM

## 2020-01-21 PROCEDURE — 80048 BASIC METABOLIC PNL TOTAL CA: CPT

## 2020-01-21 PROCEDURE — 82962 GLUCOSE BLOOD TEST: CPT

## 2020-01-21 PROCEDURE — 36415 COLL VENOUS BLD VENIPUNCTURE: CPT

## 2020-01-21 PROCEDURE — 2500000003 HC RX 250 WO HCPCS: Performed by: SURGERY

## 2020-01-21 RX ORDER — KETOROLAC TROMETHAMINE 30 MG/ML
15 INJECTION, SOLUTION INTRAMUSCULAR; INTRAVENOUS EVERY 6 HOURS
Status: DISCONTINUED | OUTPATIENT
Start: 2020-01-21 | End: 2020-01-23 | Stop reason: HOSPADM

## 2020-01-21 RX ORDER — ACETAMINOPHEN 325 MG/1
650 TABLET ORAL EVERY 6 HOURS
Status: DISCONTINUED | OUTPATIENT
Start: 2020-01-21 | End: 2020-01-23 | Stop reason: HOSPADM

## 2020-01-21 RX ADMIN — OXYCODONE HYDROCHLORIDE AND ACETAMINOPHEN 2 TABLET: 5; 325 TABLET ORAL at 00:44

## 2020-01-21 RX ADMIN — INSULIN LISPRO 4 UNITS: 100 INJECTION, SOLUTION INTRAVENOUS; SUBCUTANEOUS at 06:19

## 2020-01-21 RX ADMIN — OXYCODONE HYDROCHLORIDE AND ACETAMINOPHEN 2 TABLET: 5; 325 TABLET ORAL at 08:49

## 2020-01-21 RX ADMIN — POTASSIUM CHLORIDE, DEXTROSE MONOHYDRATE AND SODIUM CHLORIDE: 150; 5; 450 INJECTION, SOLUTION INTRAVENOUS at 08:49

## 2020-01-21 RX ADMIN — FENOFIBRATE 160 MG: 160 TABLET ORAL at 08:54

## 2020-01-21 RX ADMIN — FAMOTIDINE 20 MG: 10 INJECTION INTRAVENOUS at 20:55

## 2020-01-21 RX ADMIN — KETOROLAC TROMETHAMINE 15 MG: 30 INJECTION, SOLUTION INTRAMUSCULAR; INTRAVENOUS at 11:04

## 2020-01-21 RX ADMIN — PRAVASTATIN SODIUM 40 MG: 20 TABLET ORAL at 08:49

## 2020-01-21 RX ADMIN — INSULIN LISPRO 3 UNITS: 100 INJECTION, SOLUTION INTRAVENOUS; SUBCUTANEOUS at 21:52

## 2020-01-21 RX ADMIN — ENOXAPARIN SODIUM 40 MG: 40 INJECTION SUBCUTANEOUS at 08:48

## 2020-01-21 RX ADMIN — ACETAMINOPHEN 650 MG: 325 TABLET ORAL at 23:13

## 2020-01-21 RX ADMIN — METRONIDAZOLE 500 MG: 500 INJECTION, SOLUTION INTRAVENOUS at 00:43

## 2020-01-21 RX ADMIN — SERTRALINE 100 MG: 100 TABLET, FILM COATED ORAL at 08:50

## 2020-01-21 RX ADMIN — KETOROLAC TROMETHAMINE 15 MG: 30 INJECTION, SOLUTION INTRAMUSCULAR; INTRAVENOUS at 17:03

## 2020-01-21 RX ADMIN — METRONIDAZOLE 500 MG: 500 INJECTION, SOLUTION INTRAVENOUS at 15:54

## 2020-01-21 RX ADMIN — FAMOTIDINE 20 MG: 10 INJECTION INTRAVENOUS at 08:50

## 2020-01-21 RX ADMIN — METOPROLOL SUCCINATE 50 MG: 50 TABLET, EXTENDED RELEASE ORAL at 08:50

## 2020-01-21 RX ADMIN — INSULIN LISPRO 6 UNITS: 100 INJECTION, SOLUTION INTRAVENOUS; SUBCUTANEOUS at 11:27

## 2020-01-21 RX ADMIN — SODIUM CHLORIDE, PRESERVATIVE FREE 10 ML: 5 INJECTION INTRAVENOUS at 08:50

## 2020-01-21 RX ADMIN — POTASSIUM CHLORIDE, DEXTROSE MONOHYDRATE AND SODIUM CHLORIDE: 150; 5; 450 INJECTION, SOLUTION INTRAVENOUS at 23:13

## 2020-01-21 RX ADMIN — SODIUM CHLORIDE, PRESERVATIVE FREE 10 ML: 5 INJECTION INTRAVENOUS at 20:55

## 2020-01-21 RX ADMIN — ACETAMINOPHEN 650 MG: 325 TABLET ORAL at 11:04

## 2020-01-21 RX ADMIN — HYDRALAZINE HYDROCHLORIDE 10 MG: 20 INJECTION INTRAMUSCULAR; INTRAVENOUS at 01:00

## 2020-01-21 RX ADMIN — KETOROLAC TROMETHAMINE 15 MG: 30 INJECTION, SOLUTION INTRAMUSCULAR; INTRAVENOUS at 23:13

## 2020-01-21 RX ADMIN — LOSARTAN POTASSIUM 100 MG: 50 TABLET, FILM COATED ORAL at 08:49

## 2020-01-21 RX ADMIN — ACETAMINOPHEN 650 MG: 325 TABLET ORAL at 17:04

## 2020-01-21 RX ADMIN — METRONIDAZOLE 500 MG: 500 INJECTION, SOLUTION INTRAVENOUS at 08:49

## 2020-01-21 ASSESSMENT — PAIN SCALES - GENERAL
PAINLEVEL_OUTOF10: 0
PAINLEVEL_OUTOF10: 7
PAINLEVEL_OUTOF10: 5
PAINLEVEL_OUTOF10: 7
PAINLEVEL_OUTOF10: 7
PAINLEVEL_OUTOF10: 4
PAINLEVEL_OUTOF10: 7
PAINLEVEL_OUTOF10: 7

## 2020-01-21 ASSESSMENT — PAIN - FUNCTIONAL ASSESSMENT: PAIN_FUNCTIONAL_ASSESSMENT: PREVENTS OR INTERFERES SOME ACTIVE ACTIVITIES AND ADLS

## 2020-01-21 ASSESSMENT — PAIN DESCRIPTION - PAIN TYPE: TYPE: SURGICAL PAIN

## 2020-01-21 ASSESSMENT — PAIN DESCRIPTION - DESCRIPTORS: DESCRIPTORS: CONSTANT;SORE;TENDER

## 2020-01-21 ASSESSMENT — PAIN DESCRIPTION - FREQUENCY: FREQUENCY: CONTINUOUS

## 2020-01-21 ASSESSMENT — PAIN DESCRIPTION - ORIENTATION: ORIENTATION: MID

## 2020-01-21 ASSESSMENT — PAIN DESCRIPTION - LOCATION: LOCATION: ABDOMEN

## 2020-01-21 NOTE — PROGRESS NOTES
GENERAL SURGERY  DAILY PROGRESS NOTE  1/21/2020    Subjective:  Problems with elevated BP and high glucose overnight. Home meds restarted for BP but still required prns. On medium Insulin sliding scale  Pain controlled  NPO  Denied n/v  +F/-BM    Objective:  BP (!) 180/102   Pulse 84   Temp 97.7 °F (36.5 °C) (Oral)   Resp 22   Ht 5' 6\" (1.676 m)   Wt 215 lb (97.5 kg)   SpO2 96%   BMI 34.70 kg/m²     General: NAD, awake and alert. Head: Normocephalic, atraumatic  Eyes: PERRLA, EOMI. Lungs: No increased work of breathing. Cardiovascular: RRR. Abdomen: Soft, distended, NT. Incision c/d/i. No rebound, guarding or rigidity. Extremities: Atraumatic, full range of motion  Skin: Warm, dry and intact    Assessment/Plan:  64 y.o. male with acute appendicitis s/p Laparoscopic appendectomy converted to exploratory laparotomy POD#2    Pain and nausea control PRN  Continue CLD  mIVF's  OOB/AAT/SMI  DVT ppx w/ lovenox, scd's  Consult internal medicine for assistance with BP and glucose management. Home medications, holding oral glycemic until taking better po  Blood pressure control PRN      Electronically signed by Kandy Mckenzie DO on 1/21/2020 at 9:29 AM    Attending Note:    Patient off floor, RN unsure of where patient is at. Agree with above assessment and plan.

## 2020-01-22 LAB
ANION GAP SERPL CALCULATED.3IONS-SCNC: 13 MMOL/L (ref 7–16)
BUN BLDV-MCNC: 19 MG/DL (ref 6–20)
CALCIUM SERPL-MCNC: 9.3 MG/DL (ref 8.6–10.2)
CHLORIDE BLD-SCNC: 101 MMOL/L (ref 98–107)
CO2: 23 MMOL/L (ref 22–29)
CREAT SERPL-MCNC: 1 MG/DL (ref 0.7–1.2)
GFR AFRICAN AMERICAN: >60
GFR NON-AFRICAN AMERICAN: >60 ML/MIN/1.73
GLUCOSE BLD-MCNC: 195 MG/DL (ref 74–99)
METER GLUCOSE: 198 MG/DL (ref 74–99)
METER GLUCOSE: 202 MG/DL (ref 74–99)
METER GLUCOSE: 219 MG/DL (ref 74–99)
METER GLUCOSE: 222 MG/DL (ref 74–99)
POTASSIUM REFLEX MAGNESIUM: 3.8 MMOL/L (ref 3.5–5)
SODIUM BLD-SCNC: 137 MMOL/L (ref 132–146)

## 2020-01-22 PROCEDURE — 6360000002 HC RX W HCPCS: Performed by: INTERNAL MEDICINE

## 2020-01-22 PROCEDURE — 6360000002 HC RX W HCPCS: Performed by: SURGERY

## 2020-01-22 PROCEDURE — 36415 COLL VENOUS BLD VENIPUNCTURE: CPT

## 2020-01-22 PROCEDURE — 6370000000 HC RX 637 (ALT 250 FOR IP): Performed by: INTERNAL MEDICINE

## 2020-01-22 PROCEDURE — 80048 BASIC METABOLIC PNL TOTAL CA: CPT

## 2020-01-22 PROCEDURE — 2060000000 HC ICU INTERMEDIATE R&B

## 2020-01-22 PROCEDURE — 2500000003 HC RX 250 WO HCPCS: Performed by: STUDENT IN AN ORGANIZED HEALTH CARE EDUCATION/TRAINING PROGRAM

## 2020-01-22 PROCEDURE — 82962 GLUCOSE BLOOD TEST: CPT

## 2020-01-22 PROCEDURE — 6370000000 HC RX 637 (ALT 250 FOR IP): Performed by: SURGERY

## 2020-01-22 PROCEDURE — 2500000003 HC RX 250 WO HCPCS: Performed by: SURGERY

## 2020-01-22 PROCEDURE — 2580000003 HC RX 258: Performed by: SURGERY

## 2020-01-22 RX ORDER — DEXTROSE MONOHYDRATE 50 MG/ML
100 INJECTION, SOLUTION INTRAVENOUS PRN
Status: DISCONTINUED | OUTPATIENT
Start: 2020-01-22 | End: 2020-01-23 | Stop reason: HOSPADM

## 2020-01-22 RX ORDER — BENZONATATE 100 MG/1
200 CAPSULE ORAL 3 TIMES DAILY
Status: DISCONTINUED | OUTPATIENT
Start: 2020-01-22 | End: 2020-01-23 | Stop reason: HOSPADM

## 2020-01-22 RX ORDER — HYDROCHLOROTHIAZIDE 25 MG/1
25 TABLET ORAL DAILY
Status: DISCONTINUED | OUTPATIENT
Start: 2020-01-23 | End: 2020-01-23 | Stop reason: HOSPADM

## 2020-01-22 RX ORDER — HYDRALAZINE HYDROCHLORIDE 20 MG/ML
5 INJECTION INTRAMUSCULAR; INTRAVENOUS EVERY 8 HOURS PRN
Status: DISCONTINUED | OUTPATIENT
Start: 2020-01-22 | End: 2020-01-23 | Stop reason: HOSPADM

## 2020-01-22 RX ORDER — FUROSEMIDE 10 MG/ML
20 INJECTION INTRAMUSCULAR; INTRAVENOUS ONCE
Status: COMPLETED | OUTPATIENT
Start: 2020-01-22 | End: 2020-01-22

## 2020-01-22 RX ORDER — METOPROLOL SUCCINATE 50 MG/1
50 TABLET, EXTENDED RELEASE ORAL 2 TIMES DAILY
Status: DISCONTINUED | OUTPATIENT
Start: 2020-01-22 | End: 2020-01-23 | Stop reason: HOSPADM

## 2020-01-22 RX ORDER — CODEINE PHOSPHATE AND GUAIFENESIN 10; 100 MG/5ML; MG/5ML
5 SOLUTION ORAL EVERY 4 HOURS PRN
Status: DISCONTINUED | OUTPATIENT
Start: 2020-01-22 | End: 2020-01-23 | Stop reason: HOSPADM

## 2020-01-22 RX ORDER — NICOTINE POLACRILEX 4 MG
15 LOZENGE BUCCAL PRN
Status: DISCONTINUED | OUTPATIENT
Start: 2020-01-22 | End: 2020-01-23 | Stop reason: HOSPADM

## 2020-01-22 RX ORDER — DEXTROSE MONOHYDRATE 25 G/50ML
12.5 INJECTION, SOLUTION INTRAVENOUS PRN
Status: DISCONTINUED | OUTPATIENT
Start: 2020-01-22 | End: 2020-01-23 | Stop reason: HOSPADM

## 2020-01-22 RX ADMIN — METRONIDAZOLE 500 MG: 500 INJECTION, SOLUTION INTRAVENOUS at 00:05

## 2020-01-22 RX ADMIN — FENOFIBRATE 160 MG: 160 TABLET ORAL at 08:16

## 2020-01-22 RX ADMIN — KETOROLAC TROMETHAMINE 15 MG: 30 INJECTION, SOLUTION INTRAMUSCULAR; INTRAVENOUS at 11:14

## 2020-01-22 RX ADMIN — SODIUM CHLORIDE, PRESERVATIVE FREE 10 ML: 5 INJECTION INTRAVENOUS at 08:17

## 2020-01-22 RX ADMIN — METOPROLOL SUCCINATE 50 MG: 50 TABLET, EXTENDED RELEASE ORAL at 08:14

## 2020-01-22 RX ADMIN — LOSARTAN POTASSIUM 100 MG: 50 TABLET, FILM COATED ORAL at 08:16

## 2020-01-22 RX ADMIN — KETOROLAC TROMETHAMINE 15 MG: 30 INJECTION, SOLUTION INTRAMUSCULAR; INTRAVENOUS at 05:04

## 2020-01-22 RX ADMIN — SODIUM CHLORIDE, PRESERVATIVE FREE 10 ML: 5 INJECTION INTRAVENOUS at 20:15

## 2020-01-22 RX ADMIN — LABETALOL HYDROCHLORIDE 10 MG: 5 INJECTION INTRAVENOUS at 05:10

## 2020-01-22 RX ADMIN — INSULIN LISPRO 6 UNITS: 100 INJECTION, SOLUTION INTRAVENOUS; SUBCUTANEOUS at 06:57

## 2020-01-22 RX ADMIN — METRONIDAZOLE 500 MG: 500 INJECTION, SOLUTION INTRAVENOUS at 08:14

## 2020-01-22 RX ADMIN — PRAVASTATIN SODIUM 40 MG: 20 TABLET ORAL at 08:16

## 2020-01-22 RX ADMIN — FUROSEMIDE 20 MG: 10 INJECTION, SOLUTION INTRAMUSCULAR; INTRAVENOUS at 14:27

## 2020-01-22 RX ADMIN — KETOROLAC TROMETHAMINE 15 MG: 30 INJECTION, SOLUTION INTRAMUSCULAR; INTRAVENOUS at 16:16

## 2020-01-22 RX ADMIN — METRONIDAZOLE 500 MG: 500 INJECTION, SOLUTION INTRAVENOUS at 16:19

## 2020-01-22 RX ADMIN — ENOXAPARIN SODIUM 40 MG: 40 INJECTION SUBCUTANEOUS at 08:13

## 2020-01-22 RX ADMIN — ACETAMINOPHEN 650 MG: 325 TABLET ORAL at 23:10

## 2020-01-22 RX ADMIN — ACETAMINOPHEN 650 MG: 325 TABLET ORAL at 11:15

## 2020-01-22 RX ADMIN — KETOROLAC TROMETHAMINE 15 MG: 30 INJECTION, SOLUTION INTRAMUSCULAR; INTRAVENOUS at 23:09

## 2020-01-22 RX ADMIN — BENZONATATE 200 MG: 100 CAPSULE ORAL at 20:14

## 2020-01-22 RX ADMIN — HYDRALAZINE HYDROCHLORIDE 10 MG: 20 INJECTION INTRAMUSCULAR; INTRAVENOUS at 12:14

## 2020-01-22 RX ADMIN — METOPROLOL SUCCINATE 50 MG: 50 TABLET, EXTENDED RELEASE ORAL at 16:17

## 2020-01-22 RX ADMIN — METRONIDAZOLE 500 MG: 500 INJECTION, SOLUTION INTRAVENOUS at 23:10

## 2020-01-22 RX ADMIN — FAMOTIDINE 20 MG: 10 INJECTION INTRAVENOUS at 20:15

## 2020-01-22 RX ADMIN — HYDRALAZINE HYDROCHLORIDE 10 MG: 20 INJECTION INTRAMUSCULAR; INTRAVENOUS at 00:15

## 2020-01-22 RX ADMIN — ACETAMINOPHEN 650 MG: 325 TABLET ORAL at 05:04

## 2020-01-22 RX ADMIN — FAMOTIDINE 20 MG: 10 INJECTION INTRAVENOUS at 08:16

## 2020-01-22 RX ADMIN — ACETAMINOPHEN 650 MG: 325 TABLET ORAL at 16:17

## 2020-01-22 RX ADMIN — INSULIN LISPRO 3 UNITS: 100 INJECTION, SOLUTION INTRAVENOUS; SUBCUTANEOUS at 16:17

## 2020-01-22 RX ADMIN — SERTRALINE 100 MG: 100 TABLET, FILM COATED ORAL at 08:15

## 2020-01-22 RX ADMIN — POTASSIUM CHLORIDE, DEXTROSE MONOHYDRATE AND SODIUM CHLORIDE: 150; 5; 450 INJECTION, SOLUTION INTRAVENOUS at 14:11

## 2020-01-22 RX ADMIN — INSULIN LISPRO 3 UNITS: 100 INJECTION, SOLUTION INTRAVENOUS; SUBCUTANEOUS at 20:15

## 2020-01-22 RX ADMIN — BENZONATATE 200 MG: 100 CAPSULE ORAL at 16:17

## 2020-01-22 RX ADMIN — INSULIN LISPRO 6 UNITS: 100 INJECTION, SOLUTION INTRAVENOUS; SUBCUTANEOUS at 11:16

## 2020-01-22 ASSESSMENT — PAIN SCALES - GENERAL
PAINLEVEL_OUTOF10: 7
PAINLEVEL_OUTOF10: 5
PAINLEVEL_OUTOF10: 5
PAINLEVEL_OUTOF10: 0
PAINLEVEL_OUTOF10: 4
PAINLEVEL_OUTOF10: 7
PAINLEVEL_OUTOF10: 7
PAINLEVEL_OUTOF10: 0

## 2020-01-22 ASSESSMENT — PAIN DESCRIPTION - DIRECTION: RADIATING_TOWARDS: CONSTANT

## 2020-01-22 ASSESSMENT — PAIN DESCRIPTION - DESCRIPTORS: DESCRIPTORS: CONSTANT

## 2020-01-22 ASSESSMENT — PAIN DESCRIPTION - ONSET: ONSET: ON-GOING

## 2020-01-22 ASSESSMENT — PAIN DESCRIPTION - PROGRESSION: CLINICAL_PROGRESSION: GRADUALLY WORSENING

## 2020-01-22 ASSESSMENT — PAIN DESCRIPTION - ORIENTATION: ORIENTATION: MID

## 2020-01-22 ASSESSMENT — PAIN DESCRIPTION - LOCATION: LOCATION: ABDOMEN

## 2020-01-22 ASSESSMENT — PAIN DESCRIPTION - FREQUENCY: FREQUENCY: CONTINUOUS

## 2020-01-22 ASSESSMENT — PAIN DESCRIPTION - PAIN TYPE: TYPE: SURGICAL PAIN

## 2020-01-22 NOTE — CARE COORDINATION
CASE MANAGEMENT. .... Patient is POD # 5. Continues with high bp's. Requiring iv antihypertensives prn. Nursing checking for internal med consult to assist. Carb controlled diet started today. IVF's continued along with iv flagyl and iv pepcid. Will cont to follow and assist with needs accordingly.

## 2020-01-22 NOTE — CONSULTS
Consult Note      REASON FOR CONSULTATION: Medical management    HISTORY OF PRESENT ILLNESS: 65-year-old male patient of Dr. Vinay Street and Dr. Nicole Slaughter I am asked to consult for medical management. Patient underwent surgery for acute appendicitis 1/17/2020. The above was started as a laparoscopic; however bright red blood was found in the abdominal cavity which prompted open exploratory. There was no evidence of any bowel ischemia. There was noted to be a 10 x 8 cm hematoma of the small bowel mesentery. Vascular surgery was consulted; they could find no reason for the above. He tolerated the above surgery. He had expected postop ileus; was just started on foods and oral medications. His blood pressure has been difficult to control, prompting frequent intravenous hydralazine or labetalol for control. I am asked to consult for better management of the above. It is noted that patient is +4265 cc, intake and output since admission. Most recent blood pressure 162/96. Prehospital patient had been on losartan 100 mg daily and metoprolol succinate 50 mg daily with good blood pressure control. He is also been on oral diabetic medications. --Past history negative for rheumatic fever scarlet fever polio diphtheria cancer. Diabetes has been present since approximately 2005; hypertension 2005  --Patient is a lifelong non-smoker  --Mother and father both alive father with diabetes and hypertension  --Patient did have colonoscopy November 2019  --States he had a history of a heart murmur in the past but it has disappeared. --Denies any recurring shortness of breath dyspnea problems pneumonia asthma chest pain palpitations ; he does have occasional indigestion heartburn. Patient has had 2 episodes of kidney stones.          Past Medical History:   Diagnosis Date    Diabetes mellitus ([address]     Hypertension            Past Surgical History:   Procedure Laterality Date    COLONOSCOPY  11/04/2019    Tubular adenoma; diverticulosis    LAPAROSCOPIC APPENDECTOMY N/A 1/17/2020    APPENDECTOMY LAPAROSCOPIC, EXPLORATORY LAPAROTOMY performed by Sonido Reid MD at [facility] OR       Medications Prior to Admission:    Medications Prior to Admission: fenofibrate micronized (LOFIBRA) 134 MG capsule, Take 134 mg by mouth every morning (before breakfast)  glyBURIDE (DIABETA) 5 MG tablet, Take 5 mg by mouth 2 times daily (with meals)  losartan (COZAAR) 100 MG tablet, Take 100 mg by mouth daily  metFORMIN (GLUCOPHAGE) 500 MG tablet, Take 500 mg by mouth 2 times daily (with meals)  metoprolol succinate (TOPROL XL) 50 MG extended release tablet, Take 50 mg by mouth daily  sertraline (ZOLOFT) 25 MG tablet, Take 100 mg by mouth daily  pravastatin (PRAVACHOL) 40 MG tablet, Take 40 mg by mouth daily  aspirin 81 MG tablet, Take 81 mg by mouth daily  ALPRAZolam (XANAX) 0.25 MG tablet, Take 0.25 mg by mouth 2 times daily as needed for Sleep. Allergies:    Patient has no known allergies. Social History:    reports that he has never smoked. He has never used smokeless tobacco. He reports that he does not drink alcohol or use drugs. Family History:   family history is not on file. REVIEW OF SYSTEMS:  As above in the HPI, otherwise negative    PHYSICAL EXAM:    VS: BP (!) 162/96   Pulse 90   Temp 98 °F (36.7 °C) (Oral)   Resp 18   Ht 5' 6\" (1.676 m)   Wt 215 lb (97.5 kg)   SpO2 97%   BMI 34.70 kg/m²     General appearance: Alert, Awake, Oriented times 3, no distress; sitting in bedside chair; mother present through the exam.  Skin: Warm and dry ; no rashes  Head: Normocephalic. No masses, lesions or tenderness noted  Eyes: Conjunctivae pink, sclera white. PERRL,EOM-I  Ears: External ears normal  Nose/Sinuses: Nares normal. Septum midline. Mucosa normal. No drainage  Oropharynx: Oropharynx clear with no exudate seen  Neck: Supple.  No jugular venous distension, lymphadenopathy or thyromegaly Trachea midline  Lungs: Clear to auscultation bilaterally. No rhonchi, crackles or wheezes  Heart: S1 S2  Regular rate and rhythm. No rub, murmur or gallop  Abdomen: Soft, expected postop tenderness; midline incision dry. BS normal. No masses, organomegaly; no rebound or guarding  Extremities: No edema, Peripheral pulses palpable  Musculoskeletal: Muscular strength appears intact. Neuro:  No focal motor defects ; II-XII grossly intact .  MAHAN equally  Breast: deferred  Rectal: deferred  Genitalia:  deferred      LABS:    CBC:   Lab Results   Component Value Date    WBC 11.4 01/18/2020    RBC 4.50 01/18/2020    HGB 13.4 01/18/2020    HCT 40.6 01/18/2020    MCV 90.2 01/18/2020    MCH 29.8 01/18/2020    MCHC 33.0 01/18/2020    RDW 13.2 01/18/2020     01/18/2020    MPV 9.9 01/18/2020     CBC with Differential:    Lab Results   Component Value Date    WBC 11.4 01/18/2020    RBC 4.50 01/18/2020    HGB 13.4 01/18/2020    HCT 40.6 01/18/2020     01/18/2020    MCV 90.2 01/18/2020    MCH 29.8 01/18/2020    MCHC 33.0 01/18/2020    RDW 13.2 01/18/2020    LYMPHOPCT 14.1 01/17/2020    MONOPCT 8.7 01/17/2020    BASOPCT 0.6 01/17/2020    MONOSABS 0.72 01/17/2020    LYMPHSABS 1.17 01/17/2020    EOSABS 0.13 01/17/2020    BASOSABS 0.05 01/17/2020     Hemoglobin/Hematocrit:    Lab Results   Component Value Date    HGB 13.4 01/18/2020    HCT 40.6 01/18/2020     CMP:    Lab Results   Component Value Date     01/22/2020    K 3.8 01/22/2020     01/22/2020    CO2 23 01/22/2020    BUN 19 01/22/2020    CREATININE 1.0 01/22/2020    GFRAA >60 01/22/2020    LABGLOM >60 01/22/2020    GLUCOSE 195 01/22/2020    PROT 7.4 01/17/2020    LABALBU 4.6 01/17/2020    CALCIUM 9.3 01/22/2020    BILITOT 0.5 01/17/2020    ALKPHOS 71 01/17/2020    AST 45 01/17/2020    ALT 83 01/17/2020     BMP:    Lab Results   Component Value Date     01/22/2020    K 3.8 01/22/2020     01/22/2020    CO2 23 01/22/2020    BUN 19 01/22/2020    LABALBU 4.6 01/17/2020

## 2020-01-22 NOTE — PLAN OF CARE
Problem: Falls - Risk of:  Goal: Will remain free from falls  Description  Will remain free from falls  Outcome: Met This Shift  Goal: Absence of physical injury  Description  Absence of physical injury  Outcome: Met This Shift     Problem: Infection - Surgical Site:  Goal: Will show no infection signs and symptoms  Description  Will show no infection signs and symptoms  Outcome: Met This Shift     Problem: Cardiac:  Goal: Hemodynamic stability will improve  Description  Hemodynamic stability will improve  Outcome: Met This Shift

## 2020-01-23 VITALS
SYSTOLIC BLOOD PRESSURE: 177 MMHG | TEMPERATURE: 98.4 F | OXYGEN SATURATION: 99 % | BODY MASS INDEX: 34.55 KG/M2 | HEART RATE: 77 BPM | DIASTOLIC BLOOD PRESSURE: 102 MMHG | RESPIRATION RATE: 16 BRPM | WEIGHT: 215 LBS | HEIGHT: 66 IN

## 2020-01-23 LAB
ALBUMIN SERPL-MCNC: 3.5 G/DL (ref 3.5–5.2)
ALP BLD-CCNC: 56 U/L (ref 40–129)
ALT SERPL-CCNC: 28 U/L (ref 0–40)
ANION GAP SERPL CALCULATED.3IONS-SCNC: 13 MMOL/L (ref 7–16)
AST SERPL-CCNC: 28 U/L (ref 0–39)
BASOPHILS ABSOLUTE: 0.03 E9/L (ref 0–0.2)
BASOPHILS RELATIVE PERCENT: 0.7 % (ref 0–2)
BILIRUB SERPL-MCNC: 0.4 MG/DL (ref 0–1.2)
BILIRUBIN DIRECT: <0.2 MG/DL (ref 0–0.3)
BILIRUBIN, INDIRECT: ABNORMAL MG/DL (ref 0–1)
BUN BLDV-MCNC: 23 MG/DL (ref 6–20)
CALCIUM SERPL-MCNC: 8.9 MG/DL (ref 8.6–10.2)
CHLORIDE BLD-SCNC: 101 MMOL/L (ref 98–107)
CHOLESTEROL, TOTAL: 97 MG/DL (ref 0–199)
CO2: 21 MMOL/L (ref 22–29)
CREAT SERPL-MCNC: 1.1 MG/DL (ref 0.7–1.2)
EOSINOPHILS ABSOLUTE: 0.18 E9/L (ref 0.05–0.5)
EOSINOPHILS RELATIVE PERCENT: 4.1 % (ref 0–6)
FOLATE: 9.4 NG/ML (ref 4.8–24.2)
GFR AFRICAN AMERICAN: >60
GFR NON-AFRICAN AMERICAN: >60 ML/MIN/1.73
GLUCOSE BLD-MCNC: 218 MG/DL (ref 74–99)
HBA1C MFR BLD: 8.1 % (ref 4–5.6)
HCT VFR BLD CALC: 32.4 % (ref 37–54)
HDLC SERPL-MCNC: 39 MG/DL
HEMOGLOBIN: 10.8 G/DL (ref 12.5–16.5)
IMMATURE GRANULOCYTES #: 0.02 E9/L
IMMATURE GRANULOCYTES %: 0.5 % (ref 0–5)
LDL CHOLESTEROL CALCULATED: 30 MG/DL (ref 0–99)
LYMPHOCYTES ABSOLUTE: 0.96 E9/L (ref 1.5–4)
LYMPHOCYTES RELATIVE PERCENT: 21.6 % (ref 20–42)
MAGNESIUM: 1.9 MG/DL (ref 1.6–2.6)
MCH RBC QN AUTO: 30 PG (ref 26–35)
MCHC RBC AUTO-ENTMCNC: 33.3 % (ref 32–34.5)
MCV RBC AUTO: 90 FL (ref 80–99.9)
METER GLUCOSE: 148 MG/DL (ref 74–99)
METER GLUCOSE: 209 MG/DL (ref 74–99)
MONOCYTES ABSOLUTE: 0.52 E9/L (ref 0.1–0.95)
MONOCYTES RELATIVE PERCENT: 11.7 % (ref 2–12)
NEUTROPHILS ABSOLUTE: 2.73 E9/L (ref 1.8–7.3)
NEUTROPHILS RELATIVE PERCENT: 61.4 % (ref 43–80)
PDW BLD-RTO: 12.5 FL (ref 11.5–15)
PHOSPHORUS: 3.7 MG/DL (ref 2.5–4.5)
PLATELET # BLD: 176 E9/L (ref 130–450)
PMV BLD AUTO: 9.7 FL (ref 7–12)
POTASSIUM SERPL-SCNC: 3.8 MMOL/L (ref 3.5–5)
PRO-BNP: 180 PG/ML (ref 0–125)
RBC # BLD: 3.6 E12/L (ref 3.8–5.8)
SODIUM BLD-SCNC: 135 MMOL/L (ref 132–146)
TOTAL PROTEIN: 6.3 G/DL (ref 6.4–8.3)
TRIGL SERPL-MCNC: 140 MG/DL (ref 0–149)
TSH SERPL DL<=0.05 MIU/L-ACNC: 2.32 UIU/ML (ref 0.27–4.2)
VITAMIN B-12: 632 PG/ML (ref 211–946)
VLDLC SERPL CALC-MCNC: 28 MG/DL
WBC # BLD: 4.4 E9/L (ref 4.5–11.5)

## 2020-01-23 PROCEDURE — 2500000003 HC RX 250 WO HCPCS: Performed by: SURGERY

## 2020-01-23 PROCEDURE — 36415 COLL VENOUS BLD VENIPUNCTURE: CPT

## 2020-01-23 PROCEDURE — 84100 ASSAY OF PHOSPHORUS: CPT

## 2020-01-23 PROCEDURE — 6370000000 HC RX 637 (ALT 250 FOR IP): Performed by: SURGERY

## 2020-01-23 PROCEDURE — 6370000000 HC RX 637 (ALT 250 FOR IP): Performed by: INTERNAL MEDICINE

## 2020-01-23 PROCEDURE — 2580000003 HC RX 258: Performed by: SURGERY

## 2020-01-23 PROCEDURE — 84443 ASSAY THYROID STIM HORMONE: CPT

## 2020-01-23 PROCEDURE — 80048 BASIC METABOLIC PNL TOTAL CA: CPT

## 2020-01-23 PROCEDURE — 82962 GLUCOSE BLOOD TEST: CPT

## 2020-01-23 PROCEDURE — 6360000002 HC RX W HCPCS: Performed by: SURGERY

## 2020-01-23 PROCEDURE — 6360000002 HC RX W HCPCS: Performed by: INTERNAL MEDICINE

## 2020-01-23 PROCEDURE — 82607 VITAMIN B-12: CPT

## 2020-01-23 PROCEDURE — 80076 HEPATIC FUNCTION PANEL: CPT

## 2020-01-23 PROCEDURE — 85025 COMPLETE CBC W/AUTO DIFF WBC: CPT

## 2020-01-23 PROCEDURE — 83880 ASSAY OF NATRIURETIC PEPTIDE: CPT

## 2020-01-23 PROCEDURE — 80061 LIPID PANEL: CPT

## 2020-01-23 PROCEDURE — 83735 ASSAY OF MAGNESIUM: CPT

## 2020-01-23 PROCEDURE — 82746 ASSAY OF FOLIC ACID SERUM: CPT

## 2020-01-23 PROCEDURE — 83036 HEMOGLOBIN GLYCOSYLATED A1C: CPT

## 2020-01-23 RX ORDER — HYDROCHLOROTHIAZIDE 25 MG/1
25 TABLET ORAL DAILY
Qty: 30 TABLET | Refills: 3 | Status: SHIPPED | OUTPATIENT
Start: 2020-01-24 | End: 2021-06-24 | Stop reason: SDUPTHER

## 2020-01-23 RX ORDER — METOPROLOL SUCCINATE 50 MG/1
50 TABLET, EXTENDED RELEASE ORAL 2 TIMES DAILY
Qty: 30 TABLET | Refills: 3 | Status: SHIPPED | OUTPATIENT
Start: 2020-01-23 | End: 2020-12-03 | Stop reason: SDUPTHER

## 2020-01-23 RX ORDER — CODEINE PHOSPHATE AND GUAIFENESIN 10; 100 MG/5ML; MG/5ML
5 SOLUTION ORAL EVERY 4 HOURS PRN
Qty: 420 ML | Refills: 0 | Status: SHIPPED | OUTPATIENT
Start: 2020-01-23 | End: 2020-01-30

## 2020-01-23 RX ORDER — OXYCODONE HYDROCHLORIDE AND ACETAMINOPHEN 5; 325 MG/1; MG/1
2 TABLET ORAL EVERY 4 HOURS PRN
Qty: 40 TABLET | Refills: 0 | Status: SHIPPED | OUTPATIENT
Start: 2020-01-23 | End: 2020-02-06

## 2020-01-23 RX ORDER — BENZONATATE 200 MG/1
200 CAPSULE ORAL 3 TIMES DAILY
Qty: 21 CAPSULE | Refills: 0 | Status: SHIPPED | OUTPATIENT
Start: 2020-01-23 | End: 2020-01-30

## 2020-01-23 RX ADMIN — PRAVASTATIN SODIUM 40 MG: 20 TABLET ORAL at 08:03

## 2020-01-23 RX ADMIN — HYDROCHLOROTHIAZIDE 25 MG: 25 TABLET ORAL at 08:04

## 2020-01-23 RX ADMIN — METOPROLOL SUCCINATE 50 MG: 50 TABLET, EXTENDED RELEASE ORAL at 08:03

## 2020-01-23 RX ADMIN — FENOFIBRATE 160 MG: 160 TABLET ORAL at 08:03

## 2020-01-23 RX ADMIN — BENZONATATE 200 MG: 100 CAPSULE ORAL at 08:04

## 2020-01-23 RX ADMIN — FAMOTIDINE 20 MG: 10 INJECTION INTRAVENOUS at 08:04

## 2020-01-23 RX ADMIN — METRONIDAZOLE 500 MG: 500 INJECTION, SOLUTION INTRAVENOUS at 08:02

## 2020-01-23 RX ADMIN — ACETAMINOPHEN 650 MG: 325 TABLET ORAL at 16:32

## 2020-01-23 RX ADMIN — SERTRALINE 100 MG: 100 TABLET, FILM COATED ORAL at 08:03

## 2020-01-23 RX ADMIN — OXYCODONE HYDROCHLORIDE AND ACETAMINOPHEN 2 TABLET: 5; 325 TABLET ORAL at 13:37

## 2020-01-23 RX ADMIN — KETOROLAC TROMETHAMINE 15 MG: 30 INJECTION, SOLUTION INTRAMUSCULAR; INTRAVENOUS at 05:12

## 2020-01-23 RX ADMIN — INSULIN LISPRO 6 UNITS: 100 INJECTION, SOLUTION INTRAVENOUS; SUBCUTANEOUS at 06:46

## 2020-01-23 RX ADMIN — SODIUM CHLORIDE, PRESERVATIVE FREE 10 ML: 5 INJECTION INTRAVENOUS at 08:04

## 2020-01-23 RX ADMIN — LOSARTAN POTASSIUM 100 MG: 50 TABLET, FILM COATED ORAL at 08:04

## 2020-01-23 RX ADMIN — BENZONATATE 200 MG: 100 CAPSULE ORAL at 12:51

## 2020-01-23 RX ADMIN — HYDRALAZINE HYDROCHLORIDE 5 MG: 20 INJECTION INTRAMUSCULAR; INTRAVENOUS at 12:33

## 2020-01-23 RX ADMIN — ACETAMINOPHEN 650 MG: 325 TABLET ORAL at 05:11

## 2020-01-23 RX ADMIN — OXYCODONE HYDROCHLORIDE AND ACETAMINOPHEN 1 TABLET: 5; 325 TABLET ORAL at 09:25

## 2020-01-23 ASSESSMENT — PAIN DESCRIPTION - LOCATION
LOCATION: ABDOMEN

## 2020-01-23 ASSESSMENT — PAIN DESCRIPTION - DIRECTION: RADIATING_TOWARDS: CONSTANT

## 2020-01-23 ASSESSMENT — PAIN DESCRIPTION - PAIN TYPE
TYPE: SURGICAL PAIN

## 2020-01-23 ASSESSMENT — PAIN SCALES - GENERAL
PAINLEVEL_OUTOF10: 4
PAINLEVEL_OUTOF10: 7
PAINLEVEL_OUTOF10: 4
PAINLEVEL_OUTOF10: 3
PAINLEVEL_OUTOF10: 2
PAINLEVEL_OUTOF10: 7
PAINLEVEL_OUTOF10: 8

## 2020-01-23 ASSESSMENT — PAIN DESCRIPTION - PROGRESSION: CLINICAL_PROGRESSION: GRADUALLY WORSENING

## 2020-01-23 ASSESSMENT — PAIN DESCRIPTION - ORIENTATION
ORIENTATION: MID

## 2020-01-23 ASSESSMENT — PAIN DESCRIPTION - FREQUENCY: FREQUENCY: CONTINUOUS

## 2020-01-23 ASSESSMENT — PAIN DESCRIPTION - ONSET: ONSET: ON-GOING

## 2020-01-23 NOTE — PROGRESS NOTES
PROGRESS  NOTE --                                                          INTERNAL  MEDICINE                                                                              I  PERSONALLY SAW , EXAMINED, AND CARED Curtis Matson, 1/23/2020     LABS, XRAY ,CHART, AND MEDICATIONS  REVIEWED BY ME .      PATIENT PROBLEM LIST:  Principal Problem:    Appendicitis  Active Problems:    Mesenteric hematoma    Diabetes mellitus ([address]    Hypertension  Resolved Problems:    * No resolved hospital problems. *         1/23/2020-sUBJECTIVE: Ekaterina Gallegos is alert awake and cooperative; oriented ×3. Denies any chest pain dyspnea nausea emesis. Tolerating diet. No abdominal pain. Temperature 98.4 respirations 16 pulse 77 blood pressure 177/102.  99% saturation on room air. Intake and output increased, +5275 cc. Sodium 135 potassium 3.8 chloride 101 CO2 21 BUN 23 creatinine 1.1 magnesium 1.9 glucose 218 calcium 8.9 phosphorus 3.7 protein 6.3 albumin 3.5 proBNP slightly high 180. LDL 30. A1c 8.1 liver functions normal.  TSH 2.320. WBC 4.4 hemoglobin 10.8 platelets 643 R44 folic acid normal.  Patient was seen today by surgical service, discharge today. Patient very concerned regarding his incision; I examined it and found no significant abnormalities. He would like dressing change before discharge. Mother present through the entire exam.          ROS:  Negative to a 10 system review except that mentioned in the HPI. Objective:     PHYSICAL EXAM:    VS: BP (!) 172/100   Pulse 78   Temp 98.2 °F (36.8 °C) (Oral)   Resp 16   Ht 5' 6\" (1.676 m)   Wt 215 lb (97.5 kg)   SpO2 99%   BMI 34.70 kg/m²   General appearance: Alert, Awake, Oriented times 3, no distress  Skin: Warm and dry ; no rashes  Head: Normocephalic. No masses, lesions or tenderness noted  Eyes: Conjunctivae pink, sclera white.  PERRL,EOM-I  Ears: External ears °C)    Respiratory Rate : Resp  Av.3  Min: 16  Max: 18    Pulse Range: Pulse  Av.2  Min: 76  Max: 78    Blood Presuure Range:  Systolic (76RMB), ZFT:912 , Min:132 , XCH:687   ; Diastolic (02DMX), YDA:95, Min:74, Max:100      Pulse ox Range: SpO2  Av.8 %  Min: 96 %  Max: 99 %    Patient Vitals for the past 8 hrs:   BP Temp Temp src Pulse Resp SpO2 Weight   20 1202 (!) 172/100 98.2 °F (36.8 °C) Oral 78 16 -- --   20 1100 -- 98.1 °F (36.7 °C) Oral 77 16 99 % --   20 0745 (!) 171/100 98.4 °F (36.9 °C) Oral 78 18 97 % --   20 0554 -- -- -- -- -- -- 215 lb (97.5 kg)         Intake/Output Summary (Last 24 hours) at 2020 1339  Last data filed at 2020 1333  Gross per 24 hour   Intake 1330 ml   Output 200 ml   Net 1130 ml       I/O last 3 completed shifts: In: 18 [P.O.:240; I.V.:650; IV Piggyback:100]  Out: -     I/O this shift:   In: 26 [P.O.:460]  Out: 200 [Urine:200]    Wt Readings from Last 3 Encounters:   20 215 lb (97.5 kg)       Labs:   CBC:   Lab Results   Component Value Date    WBC 4.4 2020    RBC 3.60 2020    HGB 10.8 2020    HCT 32.4 2020    MCV 90.0 2020    MCH 30.0 2020    MCHC 33.3 2020    RDW 12.5 2020     2020    MPV 9.7 2020     CBC with Differential:    Lab Results   Component Value Date    WBC 4.4 2020    RBC 3.60 2020    HGB 10.8 2020    HCT 32.4 2020     2020    MCV 90.0 2020    MCH 30.0 2020    MCHC 33.3 2020    RDW 12.5 2020    LYMPHOPCT 21.6 2020    MONOPCT 11.7 2020    BASOPCT 0.7 2020    MONOSABS 0.52 2020    LYMPHSABS 0.96 2020    EOSABS 0.18 2020    BASOSABS 0.03 2020     Hemoglobin/Hematocrit:    Lab Results   Component Value Date    HGB 10.8 2020    HCT 32.4 2020     CMP:    Lab Results   Component Value Date     2020    K 3.8 2020    K 3.8 TSH:    Lab Results   Component Value Date    TSH 2.320 01/23/2020     VITAMIN B12: No components found for: B12  FOLATE:    Lab Results   Component Value Date    FOLATE 9.4 01/23/2020        CBC:  Recent Labs     01/23/20  0520   WBC 4.4*   RBC 3.60*   HGB 10.8*   HCT 32.4*      MCV 90.0   MCH 30.0   MCHC 33.3   RDW 12.5   LYMPHOPCT 21.6   MONOPCT 11.7   BASOPCT 0.7   MONOSABS 0.52   LYMPHSABS 0.96*   EOSABS 0.18   BASOSABS 0.03          H & H :  Recent Labs     01/23/20  0520   HGB 10.8*       TSH:  Recent Labs     01/23/20  0520   TSH 2.320       GLUCOSE:No results for input(s): POCGLU in the last 72 hours. CMP:  Recent Labs     01/21/20  0500 01/22/20  0450 01/23/20  0520    137 135   K 3.7 3.8 3.8    101 101   CO2 23 23 21*   BUN 18 19 23*   CREATININE 0.9 1.0 1.1   GFRAA >60 >60 >60   LABGLOM >60 >60 >60   GLUCOSE 262* 195* 218*   PROT  --   --  6.3*   LABALBU  --   --  3.5   CALCIUM 9.4 9.3 8.9   BILITOT  --   --  0.4   ALKPHOS  --   --  56   AST  --   --  28   ALT  --   --  28        BNP:No results found for: BNP    PROTIME/INR:No results for input(s): PROTIME, INR in the last 72 hours. CRP: No results for input(s): CRP in the last 72 hours. ESR:No results for input(s): SEDRATE in the last 72 hours. LIPASE , AMYLASE:  No results found for: LIPASE   No results found for: AMYLASE    ABGs: No results found for: PH, PO2, PCO2    CARDIAC: No results for input(s): CKTOTAL, CKMB, CKMBINDEX, TROPONINI in the last 72 hours. Lipid Profile:   Lab Results   Component Value Date    TRIG 140 01/23/2020    HDL 39 01/23/2020    LDLCALC 30 01/23/2020    CHOL 97 01/23/2020        Lab Results   Component Value Date    LABA1C 8.1 (H) 01/23/2020            RADIOLOGY: REVIEWED AVAILABLE REPORT  CT ABDOMEN PELVIS W IV CONTRAST Additional Contrast? None   Final Result         1. The appendix is mildly dilated to 9 mm.  Mild mesenteric edema is   seen adjacent to the appendix but not surrounding

## 2020-01-23 NOTE — PROGRESS NOTES
GENERAL SURGERY  DAILY PROGRESS NOTE  1/23/2020    Subjective:  No acute events  Pain controlled  Tolerating diet  Denied n/v  +F/+BM    Objective:  BP (!) 172/100   Pulse 78   Temp 98.2 °F (36.8 °C) (Oral)   Resp 16   Ht 5' 6\" (1.676 m)   Wt 215 lb (97.5 kg)   SpO2 99%   BMI 34.70 kg/m²     General: NAD, awake and alert. Head: Normocephalic, atraumatic  Eyes: PERRLA, EOMI. Lungs: No increased work of breathing. Cardiovascular: RRR. Abdomen: Soft, distended, NT. Incision c/d/i. No rebound, guarding or rigidity. Extremities: Atraumatic, full range of motion  Skin: Warm, dry and intact    Assessment/Plan:  64 y.o. male with acute appendicitis s/p Laparoscopic appendectomy converted to exploratory laparotomy POD#6    Pain and nausea control PRN  Diet as tolerated  Discharge today      Electronically signed by Evangelina Blair MD on 1/23/2020 at 12:57 PM     Attending Note:    Patient seen/examined 1/23/2020. Agree with above assessment and plan. Ok to d/c today. F/u with Dr. Monty Chaparro as directed.

## 2020-01-27 LAB
AVERAGE GLUCOSE: 180
BASOPHILS ABSOLUTE: NORMAL
BASOPHILS RELATIVE PERCENT: NORMAL
CHOLESTEROL, TOTAL: NORMAL
CHOLESTEROL/HDL RATIO: NORMAL
EOSINOPHILS ABSOLUTE: NORMAL
EOSINOPHILS RELATIVE PERCENT: NORMAL
HBA1C MFR BLD: 7.9 %
HCT VFR BLD CALC: NORMAL %
HDLC SERPL-MCNC: NORMAL MG/DL
HEMOGLOBIN: NORMAL
LDL CHOLESTEROL CALCULATED: NORMAL
LYMPHOCYTES ABSOLUTE: NORMAL
LYMPHOCYTES RELATIVE PERCENT: NORMAL
MCH RBC QN AUTO: NORMAL PG
MCHC RBC AUTO-ENTMCNC: NORMAL G/DL
MCV RBC AUTO: NORMAL FL
MONOCYTES ABSOLUTE: NORMAL
MONOCYTES RELATIVE PERCENT: NORMAL
NEUTROPHILS ABSOLUTE: NORMAL
NEUTROPHILS RELATIVE PERCENT: NORMAL
NONHDLC SERPL-MCNC: NORMAL MG/DL
PLATELET # BLD: NORMAL 10*3/UL
PMV BLD AUTO: NORMAL FL
RBC # BLD: NORMAL 10*6/UL
TRIGL SERPL-MCNC: NORMAL MG/DL
VLDLC SERPL CALC-MCNC: NORMAL MG/DL
WBC # BLD: NORMAL 10*3/UL

## 2020-02-18 ENCOUNTER — TELEPHONE (OUTPATIENT)
Dept: VASCULAR SURGERY | Age: 60
End: 2020-02-18

## 2020-02-18 ENCOUNTER — OFFICE VISIT (OUTPATIENT)
Dept: VASCULAR SURGERY | Age: 60
End: 2020-02-18

## 2020-02-18 VITALS
WEIGHT: 210 LBS | DIASTOLIC BLOOD PRESSURE: 70 MMHG | SYSTOLIC BLOOD PRESSURE: 118 MMHG | HEIGHT: 66 IN | HEART RATE: 76 BPM | BODY MASS INDEX: 33.75 KG/M2 | RESPIRATION RATE: 16 BRPM

## 2020-02-18 PROCEDURE — 99024 POSTOP FOLLOW-UP VISIT: CPT | Performed by: PHYSICIAN ASSISTANT

## 2020-02-18 NOTE — TELEPHONE ENCOUNTER
Notified patient of CT scan at [facility] on Sunday, 2-23-20. [place] at 8:30 am, NPO 4 hours prior. Instructed to hold Metformin day of CT scan and for 2 days after.

## 2020-02-18 NOTE — PROGRESS NOTES
Vascular Surgery Progress Note    Chief Complaint   Patient presents with    Post-Op Check     post-op abdominal      Patient returns for post operative evaluation status post exploration without repair of mesenteric artery with Dr Carolyn Phillips. Dr Carolyn Phillips was called in intraoperatively during laproscopic appendectomy with Dr Courtney Duran for bright red blood in the abdominal cavity. The patient denies any unexpected problems since hospital discharge. States the incision is healing well. Has no abd pain. Procedure Laterality Date    COLONOSCOPY  11/04/2019    Tubular adenoma; diverticulosis    LAPAROSCOPIC APPENDECTOMY N/A 1/17/2020    APPENDECTOMY LAPAROSCOPIC, EXPLORATORY LAPAROTOMY performed by Paulette Doshi MD at [facility] OR       Physical Exam:  The incision(s) are healing without evidence of infection. No TTP over ABD. Heart rhythm is regular. Right      Left   Brachial     Radial 3 3   Femoral     Popliteal     Dorsalis Pedis     Posterior Tibial     (3=normal, 2=diminished, 1=barely palpable, 4=widened)    Problem List Items Addressed This Visit     Mesenteric hematoma - Primary    Relevant Orders    CT ABDOMEN PELVIS W IV CONTRAST Additional Contrast? None        Pt seen and plan reviewed with Dr. Carolyn Phillips. José Montana PA-C    I reviewed with the patient that most likely the mesenteric hematoma was due to blunt force injury during entry with the Veress needle or trocar. I will order a follow-up CAT scan to rule out any abnormality such as a venous or arterial pseudoaneurysm or fistula. I feel that it is unlikely that he will have any future problems from this. I will call him after I have reviewed the scan.

## 2020-02-24 ENCOUNTER — HOSPITAL ENCOUNTER (OUTPATIENT)
Dept: CT IMAGING | Age: 60
Discharge: HOME | End: 2020-02-26
Payer: COMMERCIAL

## 2020-02-24 PROCEDURE — 74177 CT ABD & PELVIS W/CONTRAST: CPT

## 2020-02-24 PROCEDURE — 6360000004 HC RX CONTRAST MEDICATION: Performed by: RADIOLOGY

## 2020-02-24 PROCEDURE — 2580000003 HC RX 258: Performed by: RADIOLOGY

## 2020-02-24 RX ORDER — SODIUM CHLORIDE 0.9 % (FLUSH) 0.9 %
10 SYRINGE (ML) INJECTION PRN
Status: COMPLETED | OUTPATIENT
Start: 2020-02-24 | End: 2020-02-24

## 2020-02-24 RX ADMIN — IOPAMIDOL 110 ML: 755 INJECTION, SOLUTION INTRAVENOUS at 16:56

## 2020-02-24 RX ADMIN — SODIUM CHLORIDE, PRESERVATIVE FREE 10 ML: 5 INJECTION INTRAVENOUS at 16:54

## 2020-02-25 LAB

## 2020-02-27 ENCOUNTER — TELEPHONE (OUTPATIENT)
Dept: VASCULAR SURGERY | Age: 60
End: 2020-02-27

## 2020-02-27 NOTE — TELEPHONE ENCOUNTER
I left a message on the patient's voicemail regarding the CAT scan results. I see no abnormality of the mesenteric vasculature. Asked him to call office as needed.

## 2020-03-11 ENCOUNTER — HOSPITAL ENCOUNTER (OUTPATIENT)
Dept: SLEEP CENTER | Age: 60
Discharge: HOME | End: 2020-03-11
Payer: COMMERCIAL

## 2020-03-11 PROCEDURE — 93226 XTRNL ECG REC<48 HR SCAN A/R: CPT

## 2020-03-11 PROCEDURE — 93225 XTRNL ECG REC<48 HRS REC: CPT

## 2020-03-12 NOTE — DISCHARGE SUMMARY
medications    hydroCHLOROthiazide 25 MG tablet  Commonly known as:  HYDRODIURIL  Take 1 tablet by mouth daily        CHANGE how you take these medications    metoprolol succinate 50 MG extended release tablet  Commonly known as:  TOPROL XL  Take 1 tablet by mouth 2 times daily  What changed:  when to take this        CONTINUE taking these medications    ALPRAZolam 0.25 MG tablet  Commonly known as:  XANAX     aspirin 81 MG tablet     fenofibrate micronized 134 MG capsule  Commonly known as:  LOFIBRA     glyBURIDE 5 MG tablet  Commonly known as:  DIABETA     losartan 100 MG tablet  Commonly known as:  COZAAR     metFORMIN 500 MG tablet  Commonly known as:  GLUCOPHAGE     pravastatin 40 MG tablet  Commonly known as:  PRAVACHOL     sertraline 25 MG tablet  Commonly known as:  ZOLOFT        ASK your doctor about these medications    benzonatate 200 MG capsule  Commonly known as:  TESSALON  Take 1 capsule by mouth 3 times daily for 7 days  Ask about: Should I take this medication? guaiFENesin-codeine 100-10 MG/5ML liquid  Commonly known as:  GUAIFENESIN AC  Take 5 mLs by mouth every 4 hours as needed for Cough for up to 7 days. Ask about: Should I take this medication?     oxyCODONE-acetaminophen 5-325 MG per tablet  Commonly known as:  PERCOCET  Take 2 tablets by mouth every 4 hours as needed for Pain for up to 14 days. Ask about: Should I take this medication?            Where to Get Your Medications      These medications were sent to [facility], [facility] [phone] [address] [address]    Phone:  [phone]   · benzonatate 200 MG capsule  · hydroCHLOROthiazide 25 MG tablet  · metoprolol succinate 50 MG extended release tablet     You can get these medications from any pharmacy    Bring a paper prescription for each of these medications  · guaiFENesin-codeine 100-10 MG/5ML liquid  · oxyCODONE-acetaminophen 5-325 MG per tablet Patient Instructions: Activity: activity as tolerated  Diet: regular diet  Wound Care: shower  and leave the incisions open to air     Follow-up with Dr. Tena Naik in 2 weeks.     Signed:  Briseyda Lazar  3/12/2020  1:10 AM

## 2020-03-16 ENCOUNTER — APPOINTMENT (OUTPATIENT)
Dept: CT IMAGING | Age: 60
End: 2020-03-16
Payer: COMMERCIAL

## 2020-03-16 ENCOUNTER — HOSPITAL ENCOUNTER (EMERGENCY)
Age: 60
Discharge: HOME | End: 2020-03-16
Attending: EMERGENCY MEDICINE
Payer: COMMERCIAL

## 2020-03-16 VITALS
WEIGHT: 210 LBS | DIASTOLIC BLOOD PRESSURE: 94 MMHG | HEART RATE: 81 BPM | TEMPERATURE: 98.8 F | SYSTOLIC BLOOD PRESSURE: 162 MMHG | RESPIRATION RATE: 16 BRPM | HEIGHT: 66 IN | OXYGEN SATURATION: 97 % | BODY MASS INDEX: 33.75 KG/M2

## 2020-03-16 LAB
ALBUMIN SERPL-MCNC: 5.1 G/DL (ref 3.5–5.2)
ALP BLD-CCNC: 72 U/L (ref 40–129)
ALT SERPL-CCNC: 73 U/L (ref 0–40)
ANION GAP SERPL CALCULATED.3IONS-SCNC: 13 MMOL/L (ref 7–16)
AST SERPL-CCNC: 44 U/L (ref 0–39)
BACTERIA: ABNORMAL /HPF
BASOPHILS ABSOLUTE: 0.06 E9/L (ref 0–0.2)
BASOPHILS RELATIVE PERCENT: 1 % (ref 0–2)
BILIRUB SERPL-MCNC: 0.4 MG/DL (ref 0–1.2)
BILIRUBIN URINE: NEGATIVE
BLOOD, URINE: ABNORMAL
BUN BLDV-MCNC: 24 MG/DL (ref 6–20)
CALCIUM SERPL-MCNC: 10.1 MG/DL (ref 8.6–10.2)
CHLORIDE BLD-SCNC: 94 MMOL/L (ref 98–107)
CLARITY: CLEAR
CO2: 25 MMOL/L (ref 22–29)
COLOR: YELLOW
CREAT SERPL-MCNC: 1 MG/DL (ref 0.7–1.2)
EOSINOPHILS ABSOLUTE: 0.17 E9/L (ref 0.05–0.5)
EOSINOPHILS RELATIVE PERCENT: 2.7 % (ref 0–6)
GFR AFRICAN AMERICAN: >60
GFR NON-AFRICAN AMERICAN: >60 ML/MIN/1.73
GLUCOSE BLD-MCNC: 210 MG/DL (ref 74–99)
GLUCOSE URINE: NEGATIVE MG/DL
HCT VFR BLD CALC: 41.7 % (ref 37–54)
HEMOGLOBIN: 14.1 G/DL (ref 12.5–16.5)
IMMATURE GRANULOCYTES #: 0.03 E9/L
IMMATURE GRANULOCYTES %: 0.5 % (ref 0–5)
KETONES, URINE: NEGATIVE MG/DL
LACTIC ACID: 2.2 MMOL/L (ref 0.5–2.2)
LEUKOCYTE ESTERASE, URINE: NEGATIVE
LIPASE: 68 U/L (ref 13–60)
LYMPHOCYTES ABSOLUTE: 1.28 E9/L (ref 1.5–4)
LYMPHOCYTES RELATIVE PERCENT: 20.4 % (ref 20–42)
MCH RBC QN AUTO: 29.8 PG (ref 26–35)
MCHC RBC AUTO-ENTMCNC: 33.8 % (ref 32–34.5)
MCV RBC AUTO: 88.2 FL (ref 80–99.9)
MONOCYTES ABSOLUTE: 0.59 E9/L (ref 0.1–0.95)
MONOCYTES RELATIVE PERCENT: 9.4 % (ref 2–12)
NEUTROPHILS ABSOLUTE: 4.13 E9/L (ref 1.8–7.3)
NEUTROPHILS RELATIVE PERCENT: 66 % (ref 43–80)
NITRITE, URINE: NEGATIVE
PDW BLD-RTO: 12.9 FL (ref 11.5–15)
PH UA: 5.5 (ref 5–9)
PLATELET # BLD: 195 E9/L (ref 130–450)
PMV BLD AUTO: 9.9 FL (ref 7–12)
POTASSIUM REFLEX MAGNESIUM: 4 MMOL/L (ref 3.5–5)
PROTEIN UA: 30 MG/DL
RBC # BLD: 4.73 E12/L (ref 3.8–5.8)
RBC UA: ABNORMAL /HPF (ref 0–2)
SODIUM BLD-SCNC: 132 MMOL/L (ref 132–146)
SPECIFIC GRAVITY UA: <=1.005 (ref 1–1.03)
TOTAL PROTEIN: 8.1 G/DL (ref 6.4–8.3)
UROBILINOGEN, URINE: 0.2 E.U./DL
WBC # BLD: 6.3 E9/L (ref 4.5–11.5)
WBC UA: ABNORMAL /HPF (ref 0–5)

## 2020-03-16 PROCEDURE — 80053 COMPREHEN METABOLIC PANEL: CPT

## 2020-03-16 PROCEDURE — 6360000004 HC RX CONTRAST MEDICATION: Performed by: RADIOLOGY

## 2020-03-16 PROCEDURE — 96374 THER/PROPH/DIAG INJ IV PUSH: CPT

## 2020-03-16 PROCEDURE — 83605 ASSAY OF LACTIC ACID: CPT

## 2020-03-16 PROCEDURE — 83690 ASSAY OF LIPASE: CPT

## 2020-03-16 PROCEDURE — 6360000002 HC RX W HCPCS: Performed by: EMERGENCY MEDICINE

## 2020-03-16 PROCEDURE — 99284 EMERGENCY DEPT VISIT MOD MDM: CPT

## 2020-03-16 PROCEDURE — 81001 URINALYSIS AUTO W/SCOPE: CPT

## 2020-03-16 PROCEDURE — 85025 COMPLETE CBC W/AUTO DIFF WBC: CPT

## 2020-03-16 PROCEDURE — 2580000003 HC RX 258: Performed by: EMERGENCY MEDICINE

## 2020-03-16 PROCEDURE — 74177 CT ABD & PELVIS W/CONTRAST: CPT

## 2020-03-16 RX ORDER — ONDANSETRON 2 MG/ML
4 INJECTION INTRAMUSCULAR; INTRAVENOUS ONCE
Status: DISCONTINUED | OUTPATIENT
Start: 2020-03-16 | End: 2020-03-16 | Stop reason: HOSPADM

## 2020-03-16 RX ORDER — 0.9 % SODIUM CHLORIDE 0.9 %
1000 INTRAVENOUS SOLUTION INTRAVENOUS ONCE
Status: COMPLETED | OUTPATIENT
Start: 2020-03-16 | End: 2020-03-16

## 2020-03-16 RX ORDER — SODIUM CHLORIDE 0.9 % (FLUSH) 0.9 %
10 SYRINGE (ML) INJECTION PRN
Status: DISCONTINUED | OUTPATIENT
Start: 2020-03-16 | End: 2020-03-16 | Stop reason: HOSPADM

## 2020-03-16 RX ORDER — KETOROLAC TROMETHAMINE 10 MG/1
10 TABLET, FILM COATED ORAL EVERY 6 HOURS PRN
Qty: 12 TABLET | Refills: 0 | Status: SHIPPED | OUTPATIENT
Start: 2020-03-16 | End: 2021-05-18

## 2020-03-16 RX ORDER — KETOROLAC TROMETHAMINE 30 MG/ML
15 INJECTION, SOLUTION INTRAMUSCULAR; INTRAVENOUS ONCE
Status: COMPLETED | OUTPATIENT
Start: 2020-03-16 | End: 2020-03-16

## 2020-03-16 RX ORDER — ONDANSETRON 4 MG/1
4 TABLET, FILM COATED ORAL 3 TIMES DAILY PRN
Qty: 15 TABLET | Refills: 0 | Status: SHIPPED | OUTPATIENT
Start: 2020-03-16 | End: 2020-03-21

## 2020-03-16 RX ORDER — TAMSULOSIN HYDROCHLORIDE 0.4 MG/1
0.4 CAPSULE ORAL DAILY
Qty: 14 CAPSULE | Refills: 0 | Status: SHIPPED | OUTPATIENT
Start: 2020-03-16 | End: 2020-05-26

## 2020-03-16 RX ADMIN — SODIUM CHLORIDE, PRESERVATIVE FREE 10 ML: 5 INJECTION INTRAVENOUS at 11:09

## 2020-03-16 RX ADMIN — IOPAMIDOL 110 ML: 755 INJECTION, SOLUTION INTRAVENOUS at 12:01

## 2020-03-16 RX ADMIN — KETOROLAC TROMETHAMINE 15 MG: 30 INJECTION, SOLUTION INTRAMUSCULAR at 11:13

## 2020-03-16 RX ADMIN — SODIUM CHLORIDE 1000 ML: 9 INJECTION, SOLUTION INTRAVENOUS at 11:13

## 2020-03-16 ASSESSMENT — PAIN DESCRIPTION - FREQUENCY
FREQUENCY: CONTINUOUS
FREQUENCY: INTERMITTENT

## 2020-03-16 ASSESSMENT — PAIN DESCRIPTION - ORIENTATION
ORIENTATION: LEFT
ORIENTATION: LEFT

## 2020-03-16 ASSESSMENT — PAIN DESCRIPTION - DESCRIPTORS
DESCRIPTORS: ACHING
DESCRIPTORS: SHARP

## 2020-03-16 ASSESSMENT — PAIN DESCRIPTION - PROGRESSION: CLINICAL_PROGRESSION: GRADUALLY WORSENING

## 2020-03-16 ASSESSMENT — ENCOUNTER SYMPTOMS
BLOOD IN STOOL: 0
ABDOMINAL PAIN: 1
SHORTNESS OF BREATH: 0
EYE PAIN: 0
SORE THROAT: 0
ANAL BLEEDING: 0
WHEEZING: 0
CONSTIPATION: 0
EYE DISCHARGE: 0
EYE REDNESS: 0
DIARRHEA: 0
SINUS PRESSURE: 0
COUGH: 0
NAUSEA: 1
BACK PAIN: 0
VOMITING: 0

## 2020-03-16 ASSESSMENT — PAIN DESCRIPTION - PAIN TYPE: TYPE: ACUTE PAIN

## 2020-03-16 ASSESSMENT — PAIN DESCRIPTION - ONSET: ONSET: SUDDEN

## 2020-03-16 ASSESSMENT — PAIN SCALES - GENERAL
PAINLEVEL_OUTOF10: 10
PAINLEVEL_OUTOF10: 3
PAINLEVEL_OUTOF10: 10

## 2020-03-16 ASSESSMENT — PAIN DESCRIPTION - LOCATION
LOCATION: FLANK
LOCATION: FLANK

## 2020-03-16 ASSESSMENT — PAIN - FUNCTIONAL ASSESSMENT: PAIN_FUNCTIONAL_ASSESSMENT: ACTIVITIES ARE NOT PREVENTED

## 2020-03-16 NOTE — ED PROVIDER NOTES
Patient is a 82-year-old male with past medical history of diabetes, hypertension presenting to the emergency department with left flank pain. Symptoms moderate in severity. Patient states he woke up with left-sided flank/left lower quadrant pain starting when he woke up this morning. The pain has been constant but the severity of pain is intermittent. He does have history of kidney stones and states that this feels like a prior stone. He has not had one in over a year. He does note that he has had some difficulty urinating/feels like he has to go but cannot. Denies any hematuria. He did recently have abdominal surgery in January. He had a robotic appendectomy converted to an open appendectomy. He denies any diarrhea or constipation. Denies any BRB or melanotic stool. He has tried nothing at home for his symptoms. Moving in certain positions make it better and moving in other positions makes it worse. He has been nauseous but not vomiting. Denies history of bowel obstruction. Review of Systems   Constitutional: Negative for chills and fever. HENT: Negative for ear pain, sinus pressure and sore throat. Eyes: Negative for pain, discharge and redness. Respiratory: Negative for cough, shortness of breath and wheezing. Cardiovascular: Negative for chest pain. Gastrointestinal: Positive for abdominal pain and nausea. Negative for anal bleeding, blood in stool, constipation, diarrhea and vomiting. Genitourinary: Negative for dysuria and frequency. Musculoskeletal: Negative for arthralgias, back pain, neck pain and neck stiffness. Skin: Negative for rash and wound. Neurological: Negative for weakness and headaches. Hematological: Negative for adenopathy. All other systems reviewed and are negative. Physical Exam  Vitals signs and nursing note reviewed. Constitutional:       General: He is not in acute distress. Appearance: He is well-developed.  He is not ill-appearing. HENT:      Head: Normocephalic and atraumatic. Eyes:      Pupils: Pupils are equal, round, and reactive to light. Neck:      Musculoskeletal: Normal range of motion and neck supple. Cardiovascular:      Rate and Rhythm: Normal rate and regular rhythm. Heart sounds: Normal heart sounds. No murmur. Pulmonary:      Effort: Pulmonary effort is normal. No respiratory distress. Breath sounds: Normal breath sounds. No wheezing or rales. Abdominal:      General: Bowel sounds are normal. There is no distension. Palpations: Abdomen is soft. Tenderness: There is abdominal tenderness. There is no right CVA tenderness, left CVA tenderness, guarding or rebound. Comments: Tenderness to the left lower quadrant. No rebound or guarding. No peritoneal signs. Midline, healing abdominal scar without erythema or signs of infection. Skin:     General: Skin is warm and dry. Capillary Refill: Capillary refill takes less than 2 seconds. Neurological:      Mental Status: He is alert and oriented to person, place, and time. Cranial Nerves: No cranial nerve deficit. Coordination: Coordination normal.          Procedures     MDM   Patient presents emergency department with left flank pain. Initial differential diagnosis included but was not limited to kidney stone, UTI, diverticulitis, AAA, SBO. Lab work-up obtained and CT with contrast ordered. Patient was given Toradol, Zofran and IV fluids which did improve his symptoms. Lab work obtained and demonstrated a mild transaminitis. He did have this on prior lab work and has no right upper quadrant pain, no jaundice. Updated about this finding instructed to follow-up with primary care provider to reevaluate. Urinalysis did show large blood without UTI. CT scan showed mild hydronephrosis and edema in the left kidney due to an obstructing stone in the left bladder trigone probably at the UVJ.   There was hepatomegaly and counseling regarding the diagnosis and prognosis. Their questions are answered at this time and they are agreeable with the plan. I discussed at length with them reasons for immediate return here for re evaluation. They will followup with primary care by calling their office tomorrow. --------------------------------- ADDITIONAL PROVIDER NOTES ---------------------------------  At this time the patient is without objective evidence of an acute process requiring hospitalization or inpatient management. They have remained hemodynamically stable throughout their entire ED visit and are stable for discharge with outpatient follow-up. The plan has been discussed in detail and they are aware of the specific conditions for emergent return, as well as the importance of follow-up. Discharge Medication List as of 3/16/2020  1:00 PM      START taking these medications    Details   tamsulosin (FLOMAX) 0.4 MG capsule Take 1 capsule by mouth daily for 14 days, Disp-14 capsule, R-0Print      ketorolac (TORADOL) 10 MG tablet Take 1 tablet by mouth every 6 hours as needed for Pain, Disp-12 tablet, R-0Print      ondansetron (ZOFRAN) 4 MG tablet Take 1 tablet by mouth 3 times daily as needed for Nausea or Vomiting, Disp-15 tablet, R-0Print             Diagnosis:  1. Ureteral stone with hydronephrosis    2. Transaminitis        Disposition:  Patient's disposition: Discharge to home  Patient's condition is stable.        Hussein Polanco DO  Resident  03/16/20 1018 [facility] DO [place]  Resident  03/16/20 [id number]

## 2020-03-16 NOTE — ED NOTES
Presents to the ED with complaints of left flank/ groin pain since this morning. Pain is sharp and onset was sudden, which has happened in the past with kidney stones. Has had some issues with urinating and feels sweaty. Denies UTI symptoms, fever, or chills. Instructed on need for urine specimen and is ambulatory to the bathroom at this time to attempt urine specimen.       Susan Son, RACHELLE  03/16/20 [id number]

## 2020-05-22 ENCOUNTER — TELEPHONE (OUTPATIENT)
Dept: PRIMARY CARE CLINIC | Age: 60
End: 2020-05-22

## 2020-05-22 NOTE — TELEPHONE ENCOUNTER
Dr Ford Cutler pt has hx of kidney stones and feels he has one now. He is having a lot of pain but refuses to go to ER. He states he took a Flomax and a Hydrocodone that he had left over from last kidney stone. His pain is no better with the Hydrocodone and he is asking if it is safe to take a Toradol with taking the Hydrocodone.  Again pt refuses ER

## 2020-05-26 ENCOUNTER — APPOINTMENT (OUTPATIENT)
Dept: CT IMAGING | Age: 60
End: 2020-05-26
Payer: COMMERCIAL

## 2020-05-26 ENCOUNTER — HOSPITAL ENCOUNTER (EMERGENCY)
Age: 60
Discharge: HOME | End: 2020-05-26
Attending: EMERGENCY MEDICINE
Payer: COMMERCIAL

## 2020-05-26 VITALS
OXYGEN SATURATION: 99 % | HEART RATE: 75 BPM | WEIGHT: 210 LBS | RESPIRATION RATE: 16 BRPM | SYSTOLIC BLOOD PRESSURE: 165 MMHG | TEMPERATURE: 96.7 F | BODY MASS INDEX: 33.75 KG/M2 | HEIGHT: 66 IN | DIASTOLIC BLOOD PRESSURE: 70 MMHG

## 2020-05-26 LAB
ANION GAP SERPL CALCULATED.3IONS-SCNC: 12 MMOL/L (ref 7–16)
BACTERIA: ABNORMAL /HPF
BASOPHILS ABSOLUTE: 0.03 E9/L (ref 0–0.2)
BASOPHILS RELATIVE PERCENT: 0.5 % (ref 0–2)
BILIRUBIN URINE: NEGATIVE
BLOOD, URINE: ABNORMAL
BUN BLDV-MCNC: 23 MG/DL (ref 6–20)
CALCIUM SERPL-MCNC: 9.4 MG/DL (ref 8.6–10.2)
CHLORIDE BLD-SCNC: 97 MMOL/L (ref 98–107)
CLARITY: CLEAR
CO2: 24 MMOL/L (ref 22–29)
COLOR: YELLOW
CREAT SERPL-MCNC: 1.3 MG/DL (ref 0.7–1.2)
EOSINOPHILS ABSOLUTE: 0.16 E9/L (ref 0.05–0.5)
EOSINOPHILS RELATIVE PERCENT: 2.9 % (ref 0–6)
GFR AFRICAN AMERICAN: >60
GFR NON-AFRICAN AMERICAN: 57 ML/MIN/1.73
GLUCOSE BLD-MCNC: 244 MG/DL (ref 74–99)
GLUCOSE URINE: NEGATIVE MG/DL
HCT VFR BLD CALC: 36.4 % (ref 37–54)
HEMOGLOBIN: 12.2 G/DL (ref 12.5–16.5)
IMMATURE GRANULOCYTES #: 0.03 E9/L
IMMATURE GRANULOCYTES %: 0.5 % (ref 0–5)
KETONES, URINE: NEGATIVE MG/DL
LEUKOCYTE ESTERASE, URINE: NEGATIVE
LYMPHOCYTES ABSOLUTE: 0.99 E9/L (ref 1.5–4)
LYMPHOCYTES RELATIVE PERCENT: 17.8 % (ref 20–42)
MCH RBC QN AUTO: 29.5 PG (ref 26–35)
MCHC RBC AUTO-ENTMCNC: 33.5 % (ref 32–34.5)
MCV RBC AUTO: 88.1 FL (ref 80–99.9)
MONOCYTES ABSOLUTE: 0.56 E9/L (ref 0.1–0.95)
MONOCYTES RELATIVE PERCENT: 10.1 % (ref 2–12)
NEUTROPHILS ABSOLUTE: 3.78 E9/L (ref 1.8–7.3)
NEUTROPHILS RELATIVE PERCENT: 68.2 % (ref 43–80)
NITRITE, URINE: NEGATIVE
PDW BLD-RTO: 13.2 FL (ref 11.5–15)
PH UA: 6 (ref 5–9)
PLATELET # BLD: 148 E9/L (ref 130–450)
PMV BLD AUTO: 9.8 FL (ref 7–12)
POTASSIUM REFLEX MAGNESIUM: 4.3 MMOL/L (ref 3.5–5)
PROTEIN UA: 30 MG/DL
RBC # BLD: 4.13 E12/L (ref 3.8–5.8)
RBC UA: ABNORMAL /HPF (ref 0–2)
SODIUM BLD-SCNC: 133 MMOL/L (ref 132–146)
SPECIFIC GRAVITY UA: 1.01 (ref 1–1.03)
UROBILINOGEN, URINE: 0.2 E.U./DL
WBC # BLD: 5.6 E9/L (ref 4.5–11.5)
WBC UA: ABNORMAL /HPF (ref 0–5)

## 2020-05-26 PROCEDURE — 81001 URINALYSIS AUTO W/SCOPE: CPT

## 2020-05-26 PROCEDURE — 96375 TX/PRO/DX INJ NEW DRUG ADDON: CPT

## 2020-05-26 PROCEDURE — 99284 EMERGENCY DEPT VISIT MOD MDM: CPT

## 2020-05-26 PROCEDURE — 74176 CT ABD & PELVIS W/O CONTRAST: CPT

## 2020-05-26 PROCEDURE — 80048 BASIC METABOLIC PNL TOTAL CA: CPT

## 2020-05-26 PROCEDURE — 85025 COMPLETE CBC W/AUTO DIFF WBC: CPT

## 2020-05-26 PROCEDURE — 96374 THER/PROPH/DIAG INJ IV PUSH: CPT

## 2020-05-26 PROCEDURE — 6360000002 HC RX W HCPCS: Performed by: EMERGENCY MEDICINE

## 2020-05-26 PROCEDURE — 87088 URINE BACTERIA CULTURE: CPT

## 2020-05-26 RX ORDER — MORPHINE SULFATE 4 MG/ML
8 INJECTION, SOLUTION INTRAMUSCULAR; INTRAVENOUS ONCE
Status: COMPLETED | OUTPATIENT
Start: 2020-05-26 | End: 2020-05-26

## 2020-05-26 RX ORDER — TAMSULOSIN HYDROCHLORIDE 0.4 MG/1
0.4 CAPSULE ORAL DAILY
Qty: 30 CAPSULE | Refills: 3 | Status: SHIPPED | OUTPATIENT
Start: 2020-05-26 | End: 2020-09-08 | Stop reason: SDUPTHER

## 2020-05-26 RX ORDER — TAMSULOSIN HYDROCHLORIDE 0.4 MG/1
0.4 CAPSULE ORAL DAILY
Qty: 14 CAPSULE | Refills: 0 | Status: SHIPPED | OUTPATIENT
Start: 2020-05-26 | End: 2020-05-26

## 2020-05-26 RX ORDER — OXYCODONE HYDROCHLORIDE AND ACETAMINOPHEN 5; 325 MG/1; MG/1
1 TABLET ORAL EVERY 6 HOURS PRN
Qty: 12 TABLET | Refills: 0 | Status: SHIPPED | OUTPATIENT
Start: 2020-05-26 | End: 2020-05-29

## 2020-05-26 RX ORDER — KETOROLAC TROMETHAMINE 30 MG/ML
15 INJECTION, SOLUTION INTRAMUSCULAR; INTRAVENOUS ONCE
Status: COMPLETED | OUTPATIENT
Start: 2020-05-26 | End: 2020-05-26

## 2020-05-26 RX ORDER — IBUPROFEN 600 MG/1
600 TABLET ORAL EVERY 8 HOURS PRN
Qty: 21 TABLET | Refills: 0 | Status: SHIPPED | OUTPATIENT
Start: 2020-05-26 | End: 2021-05-18

## 2020-05-26 RX ORDER — ONDANSETRON 2 MG/ML
4 INJECTION INTRAMUSCULAR; INTRAVENOUS ONCE
Status: COMPLETED | OUTPATIENT
Start: 2020-05-26 | End: 2020-05-26

## 2020-05-26 RX ADMIN — KETOROLAC TROMETHAMINE 15 MG: 30 INJECTION, SOLUTION INTRAMUSCULAR at 09:09

## 2020-05-26 RX ADMIN — MORPHINE SULFATE 8 MG: 4 INJECTION, SOLUTION INTRAMUSCULAR; INTRAVENOUS at 09:10

## 2020-05-26 RX ADMIN — ONDANSETRON 4 MG: 2 INJECTION INTRAMUSCULAR; INTRAVENOUS at 09:10

## 2020-05-26 ASSESSMENT — PAIN SCALES - GENERAL
PAINLEVEL_OUTOF10: 8
PAINLEVEL_OUTOF10: 10
PAINLEVEL_OUTOF10: 8

## 2020-05-26 ASSESSMENT — PAIN DESCRIPTION - LOCATION: LOCATION: FLANK

## 2020-05-26 ASSESSMENT — PAIN DESCRIPTION - FREQUENCY: FREQUENCY: INTERMITTENT

## 2020-05-26 ASSESSMENT — PAIN DESCRIPTION - DESCRIPTORS: DESCRIPTORS: SHARP

## 2020-05-26 ASSESSMENT — PAIN DESCRIPTION - PAIN TYPE: TYPE: ACUTE PAIN

## 2020-05-26 NOTE — ED NOTES
Discharge instructions given, medications and follow up instructions reviewed. Patient verbalized understanding, no other noted or stated problems at this time. Patient will follow up with physicians as directed.       Neelima Easley RN  05/26/20 1230

## 2020-05-26 NOTE — ED PROVIDER NOTES
Negative Negative    Leukocyte Esterase, Urine Negative Negative    WBC, UA 1-3 0 - 5 /HPF    RBC, UA 1-3 0 - 2 /HPF    Bacteria, UA RARE (A) None Seen /HPF   CBC Auto Differential   Result Value Ref Range    WBC 5.6 4.5 - 11.5 E9/L    RBC 4.13 3.80 - 5.80 E12/L    Hemoglobin 12.2 (L) 12.5 - 16.5 g/dL    Hematocrit 36.4 (L) 37.0 - 54.0 %    MCV 88.1 80.0 - 99.9 fL    MCH 29.5 26.0 - 35.0 pg    MCHC 33.5 32.0 - 34.5 %    RDW 13.2 11.5 - 15.0 fL    Platelets 815 169 - 736 E9/L    MPV 9.8 7.0 - 12.0 fL    Neutrophils % 68.2 43.0 - 80.0 %    Immature Granulocytes % 0.5 0.0 - 5.0 %    Lymphocytes % 17.8 (L) 20.0 - 42.0 %    Monocytes % 10.1 2.0 - 12.0 %    Eosinophils % 2.9 0.0 - 6.0 %    Basophils % 0.5 0.0 - 2.0 %    Neutrophils Absolute 3.78 1.80 - 7.30 E9/L    Immature Granulocytes # 0.03 E9/L    Lymphocytes Absolute 0.99 (L) 1.50 - 4.00 E9/L    Monocytes Absolute 0.56 0.10 - 0.95 E9/L    Eosinophils Absolute 0.16 0.05 - 0.50 E9/L    Basophils Absolute 0.03 0.00 - 0.20 X0/P   Basic Metabolic Panel w/ Reflex to MG   Result Value Ref Range    Sodium 133 132 - 146 mmol/L    Potassium reflex Magnesium 4.3 3.5 - 5.0 mmol/L    Chloride 97 (L) 98 - 107 mmol/L    CO2 24 22 - 29 mmol/L    Anion Gap 12 7 - 16 mmol/L    Glucose 244 (H) 74 - 99 mg/dL    BUN 23 (H) 6 - 20 mg/dL    CREATININE 1.3 (H) 0.7 - 1.2 mg/dL    GFR Non-African American 57 >=60 mL/min/1.73    GFR African American >60     Calcium 9.4 8.6 - 10.2 mg/dL       RADIOLOGY:  Interpreted by Radiologist.  CT ABDOMEN PELVIS WO CONTRAST Additional Contrast? None   Final Result   Mild hydronephrosis and edema in the left kidney due to an obstructing   3 mm stone in the distal left ureter. Additional 1 to 2 mm   nonobstructing bilateral kidney stones are present. Hepatosplenomegaly with the fatty liver. Distended gallbladder without gallstones.  If clinically indicated,   consider hepatobiliary scan.                ------------------------- NURSING NOTES AND plan.      --------------------------------- IMPRESSION AND DISPOSITION ---------------------------------    IMPRESSION  1. Kidney stone        DISPOSITION  Disposition: Discharge to home  Patient condition is stable                 Joselin Jacobo MD  05/26/20 [id number]

## 2020-05-28 LAB — URINE CULTURE, ROUTINE: NORMAL

## 2020-06-11 ENCOUNTER — TELEPHONE (OUTPATIENT)
Dept: PRIMARY CARE CLINIC | Age: 60
End: 2020-06-11

## 2020-06-11 RX ORDER — ACYCLOVIR 200 MG/1
200 CAPSULE ORAL 3 TIMES DAILY
Qty: 30 CAPSULE | Refills: 0 | Status: SHIPPED | OUTPATIENT
Start: 2020-06-11 | End: 2020-06-21

## 2020-06-11 NOTE — TELEPHONE ENCOUNTER
Pt called and has a cold sore and wondering if you will call something in for him. Dr Joshua Rodriguez has called in acyclovir in the past for him.

## 2020-06-30 ENCOUNTER — OFFICE VISIT (OUTPATIENT)
Dept: FAMILY MEDICINE CLINIC | Age: 60
End: 2020-06-30
Payer: COMMERCIAL

## 2020-06-30 VITALS
RESPIRATION RATE: 16 BRPM | HEART RATE: 95 BPM | TEMPERATURE: 97.7 F | WEIGHT: 212 LBS | HEIGHT: 66 IN | BODY MASS INDEX: 34.07 KG/M2 | DIASTOLIC BLOOD PRESSURE: 86 MMHG | SYSTOLIC BLOOD PRESSURE: 142 MMHG | OXYGEN SATURATION: 97 %

## 2020-06-30 PROCEDURE — 96372 THER/PROPH/DIAG INJ SC/IM: CPT | Performed by: FAMILY MEDICINE

## 2020-06-30 PROCEDURE — 99213 OFFICE O/P EST LOW 20 MIN: CPT | Performed by: FAMILY MEDICINE

## 2020-06-30 RX ORDER — CEFDINIR 300 MG/1
300 CAPSULE ORAL 2 TIMES DAILY
Qty: 20 CAPSULE | Refills: 0 | Status: SHIPPED | OUTPATIENT
Start: 2020-06-30 | End: 2020-07-10

## 2020-06-30 RX ORDER — CEFTRIAXONE 500 MG/1
500 INJECTION, POWDER, FOR SOLUTION INTRAMUSCULAR; INTRAVENOUS ONCE
Status: COMPLETED | OUTPATIENT
Start: 2020-06-30 | End: 2020-06-30

## 2020-06-30 RX ORDER — CEFDINIR 300 MG/1
300 CAPSULE ORAL 2 TIMES DAILY
Qty: 20 CAPSULE | Refills: 0 | Status: SHIPPED
Start: 2020-06-30 | End: 2020-06-30

## 2020-06-30 RX ADMIN — CEFTRIAXONE 500 MG: 500 INJECTION, POWDER, FOR SOLUTION INTRAMUSCULAR; INTRAVENOUS at 10:28

## 2020-06-30 ASSESSMENT — ENCOUNTER SYMPTOMS
RESPIRATORY NEGATIVE: 1
COLOR CHANGE: 1

## 2020-08-10 RX ORDER — LOSARTAN POTASSIUM 100 MG/1
100 TABLET ORAL DAILY
Qty: 90 TABLET | Refills: 2 | Status: SHIPPED
Start: 2020-08-10 | End: 2021-05-05 | Stop reason: SDUPTHER

## 2020-09-08 RX ORDER — PRAVASTATIN SODIUM 40 MG
40 TABLET ORAL DAILY
Qty: 90 TABLET | Refills: 3 | Status: SHIPPED
Start: 2020-09-08 | End: 2021-08-19 | Stop reason: SDUPTHER

## 2020-09-08 RX ORDER — TAMSULOSIN HYDROCHLORIDE 0.4 MG/1
0.4 CAPSULE ORAL DAILY
Qty: 90 CAPSULE | Refills: 3 | Status: SHIPPED
Start: 2020-09-08 | End: 2020-09-09 | Stop reason: SDUPTHER

## 2020-09-09 RX ORDER — TAMSULOSIN HYDROCHLORIDE 0.4 MG/1
0.4 CAPSULE ORAL DAILY
Qty: 90 CAPSULE | Refills: 3 | Status: SHIPPED
Start: 2020-09-09 | End: 2020-09-11 | Stop reason: SDUPTHER

## 2020-09-11 RX ORDER — TAMSULOSIN HYDROCHLORIDE 0.4 MG/1
0.4 CAPSULE ORAL DAILY
Qty: 90 CAPSULE | Refills: 3 | Status: SHIPPED
Start: 2020-09-11 | End: 2020-12-03

## 2020-11-09 ENCOUNTER — HOSPITAL ENCOUNTER (OUTPATIENT)
Age: 60
Discharge: HOME | End: 2020-11-11
Payer: COMMERCIAL

## 2020-11-09 ENCOUNTER — HOSPITAL ENCOUNTER (OUTPATIENT)
Age: 60
Discharge: HOME | End: 2020-11-09
Payer: COMMERCIAL

## 2020-11-09 LAB
BACTERIA: NORMAL /HPF
BASOPHILS ABSOLUTE: 0.05 E9/L (ref 0–0.2)
BASOPHILS RELATIVE PERCENT: 0.9 % (ref 0–2)
BILIRUBIN URINE: NEGATIVE
BLOOD, URINE: ABNORMAL
CHOLESTEROL, TOTAL: 170 MG/DL (ref 0–199)
CLARITY: CLEAR
COLOR: YELLOW
CREATININE URINE: 114 MG/DL (ref 40–278)
CREATININE URINE: 121 MG/DL (ref 40–278)
EOSINOPHILS ABSOLUTE: 0.18 E9/L (ref 0.05–0.5)
EOSINOPHILS RELATIVE PERCENT: 3.1 % (ref 0–6)
GLUCOSE URINE: 500 MG/DL
HCT VFR BLD CALC: 41.7 % (ref 37–54)
HDLC SERPL-MCNC: 42 MG/DL
HEMOGLOBIN: 14.2 G/DL (ref 12.5–16.5)
IMMATURE GRANULOCYTES #: 0.03 E9/L
IMMATURE GRANULOCYTES %: 0.5 % (ref 0–5)
KETONES, URINE: NEGATIVE MG/DL
LDL CHOLESTEROL CALCULATED: 52 MG/DL (ref 0–99)
LEUKOCYTE ESTERASE, URINE: NEGATIVE
LYMPHOCYTES ABSOLUTE: 1.35 E9/L (ref 1.5–4)
LYMPHOCYTES RELATIVE PERCENT: 23 % (ref 20–42)
MAGNESIUM: 1.7 MG/DL (ref 1.6–2.6)
MCH RBC QN AUTO: 29.5 PG (ref 26–35)
MCHC RBC AUTO-ENTMCNC: 34.1 % (ref 32–34.5)
MCV RBC AUTO: 86.7 FL (ref 80–99.9)
MICROALBUMIN UR-MCNC: 1583 MG/L
MICROALBUMIN/CREAT UR-RTO: 1308.3 (ref 0–30)
MONOCYTES ABSOLUTE: 0.51 E9/L (ref 0.1–0.95)
MONOCYTES RELATIVE PERCENT: 8.7 % (ref 2–12)
NEUTROPHILS ABSOLUTE: 3.74 E9/L (ref 1.8–7.3)
NEUTROPHILS RELATIVE PERCENT: 63.8 % (ref 43–80)
NITRITE, URINE: NEGATIVE
PDW BLD-RTO: 13.1 FL (ref 11.5–15)
PH UA: 6 (ref 5–9)
PLATELET # BLD: 176 E9/L (ref 130–450)
PMV BLD AUTO: 10 FL (ref 7–12)
PROTEIN PROTEIN: 222 MG/DL (ref 0–12)
PROTEIN UA: >=300 MG/DL
PROTEIN/CREAT RATIO: 1.9
PROTEIN/CREAT RATIO: 1.9 (ref 0–0.2)
RBC # BLD: 4.81 E12/L (ref 3.8–5.8)
RBC UA: NORMAL /HPF (ref 0–2)
SPECIFIC GRAVITY UA: >=1.03 (ref 1–1.03)
TRIGL SERPL-MCNC: 379 MG/DL (ref 0–149)
URIC ACID, SERUM: 7.5 MG/DL (ref 3.4–7)
UROBILINOGEN, URINE: 0.2 E.U./DL
VLDLC SERPL CALC-MCNC: 76 MG/DL
WBC # BLD: 5.9 E9/L (ref 4.5–11.5)
WBC UA: NORMAL /HPF (ref 0–5)

## 2020-11-09 PROCEDURE — 81001 URINALYSIS AUTO W/SCOPE: CPT

## 2020-11-09 PROCEDURE — 84550 ASSAY OF BLOOD/URIC ACID: CPT

## 2020-11-09 PROCEDURE — 80061 LIPID PANEL: CPT

## 2020-11-09 PROCEDURE — 36415 COLL VENOUS BLD VENIPUNCTURE: CPT

## 2020-11-09 PROCEDURE — 82570 ASSAY OF URINE CREATININE: CPT

## 2020-11-09 PROCEDURE — 84156 ASSAY OF PROTEIN URINE: CPT

## 2020-11-09 PROCEDURE — 82044 UR ALBUMIN SEMIQUANTITATIVE: CPT

## 2020-11-09 PROCEDURE — 85025 COMPLETE CBC W/AUTO DIFF WBC: CPT

## 2020-11-09 PROCEDURE — 83735 ASSAY OF MAGNESIUM: CPT

## 2020-12-03 ENCOUNTER — OFFICE VISIT (OUTPATIENT)
Dept: PRIMARY CARE CLINIC | Age: 60
End: 2020-12-03
Payer: COMMERCIAL

## 2020-12-03 VITALS
HEART RATE: 90 BPM | OXYGEN SATURATION: 98 % | SYSTOLIC BLOOD PRESSURE: 154 MMHG | BODY MASS INDEX: 34.23 KG/M2 | DIASTOLIC BLOOD PRESSURE: 80 MMHG | TEMPERATURE: 97.2 F | HEIGHT: 66 IN | WEIGHT: 213 LBS

## 2020-12-03 PROBLEM — F32.A ANXIETY AND DEPRESSION: Status: ACTIVE | Noted: 2020-12-03

## 2020-12-03 PROBLEM — E66.811 CLASS 1 OBESITY DUE TO EXCESS CALORIES IN ADULT: Status: ACTIVE | Noted: 2020-12-03

## 2020-12-03 PROBLEM — K76.0 HEPATIC STEATOSIS: Status: ACTIVE | Noted: 2020-12-03

## 2020-12-03 PROBLEM — K37 APPENDICITIS: Status: RESOLVED | Noted: 2020-01-17 | Resolved: 2020-12-03

## 2020-12-03 PROBLEM — N18.31 STAGE 3A CHRONIC KIDNEY DISEASE (HCC): Status: ACTIVE | Noted: 2020-12-03

## 2020-12-03 PROBLEM — S36.892A MESENTERIC HEMATOMA: Status: RESOLVED | Noted: 2020-01-17 | Resolved: 2020-12-03

## 2020-12-03 PROBLEM — E78.1 HYPERTRIGLYCERIDEMIA: Status: ACTIVE | Noted: 2020-12-03

## 2020-12-03 PROBLEM — K21.9 GASTROESOPHAGEAL REFLUX DISEASE WITHOUT ESOPHAGITIS: Status: ACTIVE | Noted: 2020-12-03

## 2020-12-03 PROBLEM — E66.09 CLASS 1 OBESITY DUE TO EXCESS CALORIES IN ADULT: Status: ACTIVE | Noted: 2020-12-03

## 2020-12-03 PROBLEM — E11.21 DIABETIC NEPHROPATHY ASSOCIATED WITH TYPE 2 DIABETES MELLITUS (HCC): Status: ACTIVE | Noted: 2020-12-03

## 2020-12-03 PROBLEM — F41.9 ANXIETY AND DEPRESSION: Status: ACTIVE | Noted: 2020-12-03

## 2020-12-03 LAB — HBA1C MFR BLD: 10.1 %

## 2020-12-03 PROCEDURE — 83036 HEMOGLOBIN GLYCOSYLATED A1C: CPT | Performed by: INTERNAL MEDICINE

## 2020-12-03 PROCEDURE — 99214 OFFICE O/P EST MOD 30 MIN: CPT | Performed by: INTERNAL MEDICINE

## 2020-12-03 RX ORDER — GLYBURIDE 5 MG/1
5 TABLET ORAL 2 TIMES DAILY
Qty: 180 TABLET | Refills: 3 | Status: SHIPPED
Start: 2020-12-03 | End: 2021-11-19 | Stop reason: SDUPTHER

## 2020-12-03 RX ORDER — SERTRALINE HYDROCHLORIDE 100 MG/1
100 TABLET, FILM COATED ORAL DAILY
COMMUNITY
End: 2020-12-03 | Stop reason: SDUPTHER

## 2020-12-03 RX ORDER — METOPROLOL SUCCINATE 50 MG/1
50 TABLET, EXTENDED RELEASE ORAL 2 TIMES DAILY
Qty: 180 TABLET | Refills: 3 | Status: SHIPPED
Start: 2020-12-03 | End: 2021-12-13 | Stop reason: SDUPTHER

## 2020-12-03 RX ORDER — GLYBURIDE 5 MG/1
TABLET ORAL
Qty: 30 TABLET | Refills: 5 | Status: SHIPPED
Start: 2020-12-03 | End: 2020-12-03

## 2020-12-03 RX ORDER — GLYBURIDE 5 MG/1
2.5 TABLET ORAL 2 TIMES DAILY
COMMUNITY
End: 2020-12-03 | Stop reason: SDUPTHER

## 2020-12-03 RX ORDER — FENOFIBRATE 134 MG/1
134 CAPSULE ORAL
Qty: 90 CAPSULE | Refills: 3 | Status: SHIPPED
Start: 2020-12-03 | End: 2021-10-25 | Stop reason: SDUPTHER

## 2020-12-03 RX ORDER — GLYBURIDE 5 MG/1
5 TABLET ORAL 2 TIMES DAILY
COMMUNITY
End: 2020-12-03 | Stop reason: SDUPTHER

## 2020-12-03 RX ORDER — SERTRALINE HYDROCHLORIDE 100 MG/1
100 TABLET, FILM COATED ORAL DAILY
Qty: 90 TABLET | Refills: 3 | Status: SHIPPED
Start: 2020-12-03 | End: 2021-12-07 | Stop reason: SDUPTHER

## 2020-12-03 ASSESSMENT — PATIENT HEALTH QUESTIONNAIRE - PHQ9
SUM OF ALL RESPONSES TO PHQ QUESTIONS 1-9: 0
2. FEELING DOWN, DEPRESSED OR HOPELESS: 0
SUM OF ALL RESPONSES TO PHQ QUESTIONS 1-9: 0
SUM OF ALL RESPONSES TO PHQ QUESTIONS 1-9: 0
1. LITTLE INTEREST OR PLEASURE IN DOING THINGS: 0
SUM OF ALL RESPONSES TO PHQ9 QUESTIONS 1 & 2: 0

## 2020-12-03 NOTE — PROGRESS NOTES
good control. He has a history of hepatic steatosis with chronically elevated liver enzymes. He remains on his current meds and supplements the same as listed on his med list.  Planes of occasional night sweats but denies any fever. He has not had any weight loss. He complains of occasional headaches which may be related to his hypertension. He complains of frequent heartburn. Review of Systems    General:   no weight change, no malaise, no fatigue, no change in appetite, no sleep disturbance, no fever/chills. Skin:                no abnormal pigmentation, no rash, no scaling, no itching, no masses, no hair or nail changes  Eyes:               no blurring, no diplopia, no eye pain, no glaucoma, no cataracts  ENT:                 no hearing loss, no tinnitus, no vertigo, no nosebleed, no nasal congestion, no rhinorrhea, no sore throat, no jaw pain, no hoarseness,  no bleeding    Neck:     no node tenderness , not rigid, no masses   Respiratory:           no cough, no sputum, no coughing blood, no pleuritic , no chest pain, no dyspnea,  no wheezing  Cardiovascular:         no angina, no chest pain  No syncope, no pedal edema , no orthopnea, no PND, no palpitations, no claudication  Gastrointestinal  no nausea, no vomiting, no heartburn, no diarrhea, no constipation, no bloating, no abdominal pain, no rectal pain, no bleeding no hemorrhoids, no hernia. Genitourinary:            no urinary urgency, no frequency, no dysuria, no nocturia, no hesitancy, no  incontinence, no bleeding, no stones  Musculoskeletal:        no arthritis, no  arthralgia, no myalgia, no  weakness,  no morning stiffness, no joint swelling   Neurologic:                 Occasional headaches as noted above.   Hematologic:              no anemia, no abnormal bleeding / bruising, no fever, no chills, no night sweats, no wollen glands, no unexplained weight loss  Endocrine:        no heat or cold intolerance and no polyphagia, polydipsia,  or polyuria  Psych:  Chronic anxiety and depression for which she takes Zoloft 100 mg daily. Physical Exam:  Patient is an 64 y.o. male     Constitutional:  General Appearance: Well-appearing. He is obese. Level of Distress: NAD. Ambulation: ambulating normally    Psychiatric:  Insight: good judgment: Mental status: normal mood and affect. Orientation: oriented to time place and person. Memory: recent memory normal and remote memory normal.     Head: normocephalic and atraumatic. Eyes:  Lids and Conjunctiva: Non-injected and no pallor. Pupils: PERRLA, Corneas: grossly intact. EOMI, Lens: clear. Sclerae: non-icteric. Vision: peripheral vision grossly intact and acuity grossly intact. ENMT:  Ears: no lesions on external ear. TMs clear and TM mobility normal.  Hearing: no hearing loss. Nose: No lesions on external nose, septal deviation sinus tenderness or nasal discharge and nares patent and nasal passages clear. Lips, Teeth and Gums:  no mouth or lip ulcers or bleeding gums and normal dentition. Oropharynx: no erythema or exudates and moist mucous membranes and tonsils not enlarged. Neck:  Neck supple, FROM, trachea midline, and no masses. Lymph nodes: no cervical LAD, supraclavicular LAD, axillary LAD, or inguinal LAD. Thyroid: non-tender and no enlargement. Lungs:  Respiratory effort, no dyspnea. Auscultation: no rales/crackles or rhonchi and breath sounds normal, good air movement and CTA except as noted. Cardiovascular: Apical impulse:not displaced. Heart: Auscultation: normal S1 and S2, no murmurs, rubs or gallops; and RRR. Neck vessels: no carotid bruits. Pulses including femoral/pedal: normal throughout. Abdomen: Soft and nontender. Liver and spleen somewhat enlarged. Healed midline surgical scar. Musculoskeletal: Motor Strength and Tone: normal tone and motor strength.   Joints, Bones and Muscles: no malalignment, tenderness or bony abnormalities and normal movement of all extremities. Extremities: no cyanosis, edema, varicosities, or palpable cord. Neurologic: Grossly normal.    Skin: Inspection and palpation: no rash, lesions, ulcer, induration, nodules, jaundice or abnormal nevi and good turgor. Nails: normal.     Back:  Thoracolumbar Appearance: normal curvature. I have examined the patient's feet and found no evidence of deformities, calluses, ulcerations, or evidence of neuropathy. Lab Results   Component Value Date    WBC 5.9 11/09/2020    HGB 14.2 11/09/2020    HCT 41.7 11/09/2020     11/09/2020    CHOL 170 11/09/2020    TRIG 379 (H) 11/09/2020    HDL 42 11/09/2020    ALT 73 (H) 03/16/2020    AST 44 (H) 03/16/2020    TSH 2.320 01/23/2020    LABA1C 10.1 12/03/2020    LABMICR 1583.0 (H) 11/09/2020      Lab Results   Component Value Date     05/26/2020    K 4.3 05/26/2020    CL 97 (L) 05/26/2020    CO2 24 05/26/2020    BUN 23 (H) 05/26/2020    CREATININE 1.3 (H) 05/26/2020    GLUCOSE 244 (H) 05/26/2020    CALCIUM 9.4 05/26/2020    PROT 8.1 03/16/2020    LABALBU 5.1 03/16/2020    BILITOT 0.4 03/16/2020    ALKPHOS 72 03/16/2020    AST 44 (H) 03/16/2020    ALT 73 (H) 03/16/2020    LABGLOM 57 05/26/2020    GFRAA >60 05/26/2020            Assessment:    Essential hypertension, currently not well controlled and meds being adjusted by his nephrologist.  -     metoprolol succinate (TOPROL XL) 50 MG extended release tablet; Take 1 tablet by mouth 2 times daily  -     Comprehensive Metabolic Panel; Future    Hepatic steatosis, chronic and associated with hepatomegaly and splenomegaly. -     Comprehensive Metabolic Panel; Future    Class 1 obesity due to excess calories with serious comorbidity and body mass index (BMI) of 34.0 to 34.9 in adult    Anxiety and depression  -     sertraline (ZOLOFT) 100 MG tablet;  Take 1 tablet by mouth daily    Type 2 diabetes mellitus with diabetic nephropathy, without long-term current use of insulin ([facility] [address] currently not well controlled in part due to dietary noncompliance and lack of exercise.  -     glyBURIDE (DIABETA) 5 MG tablet; Take 1 tablet by mouth 2 times daily  -     POCT glycosylated hemoglobin (Hb A1C)  -     Comprehensive Metabolic Panel; Future    Hypertriglyceridemia  -     fenofibrate micronized (LOFIBRA) 134 MG capsule; Take 1 capsule by mouth every morning (before breakfast)    Prostate cancer screening  -     Psa screening; Future    Gastroesophageal reflux disease without esophagitis    Diabetic nephropathy associated with type 2 diabetes mellitus ([facility] [address] currently being treated and followed by his nephrologist.    Stage 3a chronic kidney disease, probably due to diabetic nephropathy          Discussion Notes: Patient advised to continue all of his current meds and supplements the same as listed on his med list.  He strongly advised to try to follow a low-sodium, low-cholesterol, low refined carbohydrate diet and start exercising on a regular basis. We will get labs including a CMP and a PSA and will make further recommendations depending on the results. He is requesting a freestyle bon system to monitor his blood sugars and will send that prescription to his pharmacy. He will continue to follow-up with his nephrologist as per their instructions.

## 2020-12-04 RX ORDER — FLASH GLUCOSE SENSOR
1 KIT MISCELLANEOUS
Qty: 2 EACH | Refills: 3 | Status: SHIPPED
Start: 2020-12-04 | End: 2020-12-15

## 2020-12-04 RX ORDER — FLASH GLUCOSE SENSOR
KIT MISCELLANEOUS
COMMUNITY
End: 2020-12-04 | Stop reason: SDUPTHER

## 2020-12-04 RX ORDER — FLASH GLUCOSE SCANNING READER
1 EACH MISCELLANEOUS DAILY
Qty: 1 DEVICE | Refills: 0 | Status: SHIPPED
Start: 2020-12-04 | End: 2020-12-15

## 2020-12-04 RX ORDER — FLASH GLUCOSE SCANNING READER
1 EACH MISCELLANEOUS
COMMUNITY
End: 2020-12-04 | Stop reason: SDUPTHER

## 2020-12-15 RX ORDER — BLOOD-GLUCOSE,RECEIVER,CONT
EACH MISCELLANEOUS
COMMUNITY
End: 2021-06-28

## 2020-12-15 RX ORDER — BLOOD-GLUCOSE,RECEIVER,CONT
EACH MISCELLANEOUS
COMMUNITY
End: 2020-12-15 | Stop reason: SDUPTHER

## 2020-12-15 RX ORDER — BLOOD-GLUCOSE,RECEIVER,CONT
1 EACH MISCELLANEOUS DAILY
Qty: 1 DEVICE | Refills: 0 | Status: SHIPPED
Start: 2020-12-15 | End: 2020-12-22 | Stop reason: SDUPTHER

## 2020-12-22 RX ORDER — BLOOD-GLUCOSE,RECEIVER,CONT
1 EACH MISCELLANEOUS DAILY
Qty: 1 DEVICE | Refills: 0 | Status: SHIPPED | OUTPATIENT
Start: 2020-12-22 | End: 2021-06-28

## 2021-04-08 DIAGNOSIS — I10 ESSENTIAL HYPERTENSION: ICD-10-CM

## 2021-04-08 DIAGNOSIS — Z12.5 PROSTATE CANCER SCREENING: ICD-10-CM

## 2021-04-08 DIAGNOSIS — E11.21 TYPE 2 DIABETES MELLITUS WITH DIABETIC NEPHROPATHY, WITHOUT LONG-TERM CURRENT USE OF INSULIN (HCC): ICD-10-CM

## 2021-04-08 DIAGNOSIS — K76.0 HEPATIC STEATOSIS: ICD-10-CM

## 2021-04-08 LAB
ALBUMIN SERPL-MCNC: 4.8 G/DL (ref 3.5–5.2)
ALP BLD-CCNC: 75 U/L (ref 40–129)
ALT SERPL-CCNC: 73 U/L (ref 0–40)
ANION GAP SERPL CALCULATED.3IONS-SCNC: 15 MMOL/L (ref 7–16)
AST SERPL-CCNC: 51 U/L (ref 0–39)
BASOPHILS ABSOLUTE: 0.05 E9/L (ref 0–0.2)
BASOPHILS RELATIVE PERCENT: 1 % (ref 0–2)
BILIRUB SERPL-MCNC: 0.4 MG/DL (ref 0–1.2)
BUN BLDV-MCNC: 18 MG/DL (ref 6–20)
CALCIUM SERPL-MCNC: 10.3 MG/DL (ref 8.6–10.2)
CHLORIDE BLD-SCNC: 98 MMOL/L (ref 98–107)
CO2: 26 MMOL/L (ref 22–29)
CREAT SERPL-MCNC: 1 MG/DL (ref 0.7–1.2)
CREATININE URINE: 164 MG/DL (ref 40–278)
EOSINOPHILS ABSOLUTE: 0.19 E9/L (ref 0.05–0.5)
EOSINOPHILS RELATIVE PERCENT: 3.9 % (ref 0–6)
GFR AFRICAN AMERICAN: >60
GFR NON-AFRICAN AMERICAN: >60 ML/MIN/1.73
GLUCOSE BLD-MCNC: 284 MG/DL (ref 74–99)
HCT VFR BLD CALC: 40.6 % (ref 37–54)
HEMOGLOBIN: 13.8 G/DL (ref 12.5–16.5)
IMMATURE GRANULOCYTES #: 0.02 E9/L
IMMATURE GRANULOCYTES %: 0.4 % (ref 0–5)
LYMPHOCYTES ABSOLUTE: 1.1 E9/L (ref 1.5–4)
LYMPHOCYTES RELATIVE PERCENT: 22.4 % (ref 20–42)
MAGNESIUM: 1.7 MG/DL (ref 1.6–2.6)
MCH RBC QN AUTO: 29.5 PG (ref 26–35)
MCHC RBC AUTO-ENTMCNC: 34 % (ref 32–34.5)
MCV RBC AUTO: 86.8 FL (ref 80–99.9)
MICROALBUMIN UR-MCNC: 2133.6 MG/L
MICROALBUMIN/CREAT UR-RTO: 1301 (ref 0–30)
MONOCYTES ABSOLUTE: 0.48 E9/L (ref 0.1–0.95)
MONOCYTES RELATIVE PERCENT: 9.8 % (ref 2–12)
NEUTROPHILS ABSOLUTE: 3.07 E9/L (ref 1.8–7.3)
NEUTROPHILS RELATIVE PERCENT: 62.5 % (ref 43–80)
PDW BLD-RTO: 13.2 FL (ref 11.5–15)
PHOSPHORUS: 3.2 MG/DL (ref 2.5–4.5)
PLATELET # BLD: 168 E9/L (ref 130–450)
PMV BLD AUTO: 10.3 FL (ref 7–12)
POTASSIUM SERPL-SCNC: 4.2 MMOL/L (ref 3.5–5)
PROSTATE SPECIFIC ANTIGEN: 1.02 NG/ML (ref 0–4)
PROTEIN PROTEIN: 267 MG/DL (ref 0–12)
PROTEIN/CREAT RATIO: 1.6
PROTEIN/CREAT RATIO: 1.6 (ref 0–0.2)
RBC # BLD: 4.68 E12/L (ref 3.8–5.8)
SODIUM BLD-SCNC: 139 MMOL/L (ref 132–146)
TOTAL PROTEIN: 7.7 G/DL (ref 6.4–8.3)
URIC ACID, SERUM: 5.7 MG/DL (ref 3.4–7)
WBC # BLD: 4.9 E9/L (ref 4.5–11.5)

## 2021-05-04 ENCOUNTER — TELEPHONE (OUTPATIENT)
Dept: PRIMARY CARE CLINIC | Age: 61
End: 2021-05-04

## 2021-05-04 DIAGNOSIS — E11.21 TYPE 2 DIABETES MELLITUS WITH DIABETIC NEPHROPATHY, WITHOUT LONG-TERM CURRENT USE OF INSULIN (HCC): Primary | ICD-10-CM

## 2021-05-04 DIAGNOSIS — E78.1 HYPERTRIGLYCERIDEMIA: ICD-10-CM

## 2021-05-04 DIAGNOSIS — K76.0 HEPATIC STEATOSIS: ICD-10-CM

## 2021-05-04 DIAGNOSIS — N18.31 STAGE 3A CHRONIC KIDNEY DISEASE (HCC): ICD-10-CM

## 2021-05-04 DIAGNOSIS — I10 ESSENTIAL HYPERTENSION: ICD-10-CM

## 2021-05-04 NOTE — TELEPHONE ENCOUNTER
It may take a while for him to get into see the endocrinologist.  He is due for an appointment here for follow-up visit and labs. His endocrinologist is going to want those anyway so I will put orders in epic for his labs and then he should make an appointment after he has had done.

## 2021-05-04 NOTE — TELEPHONE ENCOUNTER
Okay I will place referral to [id number] [facility] endocrinology.

## 2021-05-05 RX ORDER — LOSARTAN POTASSIUM 100 MG/1
100 TABLET ORAL DAILY
Qty: 90 TABLET | Refills: 3 | Status: SHIPPED
Start: 2021-05-05 | End: 2022-08-08 | Stop reason: SDUPTHER

## 2021-05-12 ENCOUNTER — OFFICE VISIT (OUTPATIENT)
Dept: PRIMARY CARE CLINIC | Age: 61
End: 2021-05-12
Payer: COMMERCIAL

## 2021-05-12 VITALS
SYSTOLIC BLOOD PRESSURE: 120 MMHG | TEMPERATURE: 97.2 F | OXYGEN SATURATION: 97 % | RESPIRATION RATE: 18 BRPM | BODY MASS INDEX: 33.91 KG/M2 | HEART RATE: 102 BPM | WEIGHT: 211 LBS | DIASTOLIC BLOOD PRESSURE: 74 MMHG | HEIGHT: 66 IN

## 2021-05-12 DIAGNOSIS — K76.0 HEPATIC STEATOSIS: ICD-10-CM

## 2021-05-12 DIAGNOSIS — K21.9 GASTROESOPHAGEAL REFLUX DISEASE, UNSPECIFIED WHETHER ESOPHAGITIS PRESENT: ICD-10-CM

## 2021-05-12 DIAGNOSIS — R00.2 HEART PALPITATIONS: Primary | ICD-10-CM

## 2021-05-12 DIAGNOSIS — E78.2 MIXED HYPERLIPIDEMIA: ICD-10-CM

## 2021-05-12 DIAGNOSIS — I10 ESSENTIAL HYPERTENSION: ICD-10-CM

## 2021-05-12 DIAGNOSIS — K21.9 GASTROESOPHAGEAL REFLUX DISEASE WITHOUT ESOPHAGITIS: ICD-10-CM

## 2021-05-12 DIAGNOSIS — E11.21 TYPE 2 DIABETES MELLITUS WITH DIABETIC NEPHROPATHY, WITHOUT LONG-TERM CURRENT USE OF INSULIN (HCC): ICD-10-CM

## 2021-05-12 DIAGNOSIS — E66.09 CLASS 1 OBESITY DUE TO EXCESS CALORIES WITH SERIOUS COMORBIDITY AND BODY MASS INDEX (BMI) OF 34.0 TO 34.9 IN ADULT: ICD-10-CM

## 2021-05-12 DIAGNOSIS — N18.31 STAGE 3A CHRONIC KIDNEY DISEASE (HCC): ICD-10-CM

## 2021-05-12 DIAGNOSIS — R00.2 PALPITATIONS: Primary | ICD-10-CM

## 2021-05-12 DIAGNOSIS — R07.89 CHEST HEAVINESS: ICD-10-CM

## 2021-05-12 PROCEDURE — 93000 ELECTROCARDIOGRAM COMPLETE: CPT | Performed by: INTERNAL MEDICINE

## 2021-05-12 PROCEDURE — 99214 OFFICE O/P EST MOD 30 MIN: CPT | Performed by: INTERNAL MEDICINE

## 2021-05-12 RX ORDER — POTASSIUM CITRATE 10 MEQ/1
10 TABLET, EXTENDED RELEASE ORAL 2 TIMES DAILY WITH MEALS
COMMUNITY
End: 2021-10-04 | Stop reason: SDUPTHER

## 2021-05-12 RX ORDER — ALLOPURINOL 100 MG/1
100 TABLET ORAL DAILY
COMMUNITY
End: 2021-09-16 | Stop reason: SDUPTHER

## 2021-05-12 RX ORDER — AMLODIPINE BESYLATE AND ATORVASTATIN CALCIUM 10; 10 MG/1; MG/1
1 TABLET, FILM COATED ORAL DAILY
COMMUNITY
End: 2021-06-28

## 2021-05-12 NOTE — PROGRESS NOTES
Avtar Santos  5/12/21     Chief Complaint   Patient presents with    Hypertension     follow up with labs, heart racing x2 weeks         No Known Allergies     Current Outpatient Medications   Medication Sig Dispense Refill    metFORMIN (GLUCOPHAGE) 1000 MG tablet Take 1,000 mg by mouth 2 times daily (with meals)      potassium citrate (UROCIT-K) 10 MEQ (1080 MG) extended release tablet Take 10 mEq by mouth 2 times daily (with meals)      allopurinol (ZYLOPRIM) 100 MG tablet Take 100 mg by mouth daily      amLODIPine-atorvastatatin (CADUET) 10-10 MG per tablet Take 1 tablet by mouth daily      losartan (COZAAR) 100 MG tablet Take 1 tablet by mouth daily 90 tablet 3    Continuous Blood Gluc  (DEXCOM G5  KIT) CAMRYN 1 kit by Does not apply route daily 1 Device 0    Continuous Blood Gluc  (DEXCOM G4 PLAT PED RCV/SHARE) CAMRYN by Does not apply route      sertraline (ZOLOFT) 100 MG tablet Take 1 tablet by mouth daily 90 tablet 3    glyBURIDE (DIABETA) 5 MG tablet Take 1 tablet by mouth 2 times daily 180 tablet 3    fenofibrate micronized (LOFIBRA) 134 MG capsule Take 1 capsule by mouth every morning (before breakfast) 90 capsule 3    metoprolol succinate (TOPROL XL) 50 MG extended release tablet Take 1 tablet by mouth 2 times daily 180 tablet 3    pravastatin (PRAVACHOL) 40 MG tablet Take 1 tablet by mouth daily 90 tablet 3    ibuprofen (ADVIL;MOTRIN) 600 MG tablet Take 1 tablet by mouth every 8 hours as needed for Pain 21 tablet 0    hydrochlorothiazide (HYDRODIURIL) 25 MG tablet Take 1 tablet by mouth daily 30 tablet 3    metFORMIN (GLUCOPHAGE) 500 MG tablet Take 500 mg by mouth 2 times daily (with meals)      aspirin 81 MG tablet Take 81 mg by mouth daily      ketorolac (TORADOL) 10 MG tablet Take 1 tablet by mouth every 6 hours as needed for Pain 12 tablet 0    ALPRAZolam (XANAX) 0.25 MG tablet Take 0.25 mg by mouth 2 times daily as needed for Sleep.        No current facility-administered medications for this visit. HPI: Patient comes in for follow-up visit. He is now 63years of age. Lately has been complaining of episodes of rapid heartbeat associated with chest heaviness. His symptoms are not necessarily related to exertion. He has no prior history of heart disease but has multiple coronary risk factors including hypertension, hyperlipidemia, obesity, and diabetes. He denies any shortness of breath. He admits he does not watch his diet as well as he should with respect to cholesterol, saturated fats, and sweets. He also complains of episodes of severe acid reflux. He used to take Tums on an as-needed basis but stopped doing that since he had a kidney stone. He has not tried any over-the-counter acid blockers such as Pepcid or omeprazole. He states he has never had an EGD. He follows up with his nephrologist for his diabetic nephropathy. His BUN/creatinine has been normal most recently but he does have significant amount of protein in his urine. He remains on all of his usual meds and supplements the same as listed on his med list, which was reviewed with him. He states he does have an appointment to see an endocrinologist in the near future, to take over management of his diabetes mellitus. Review of Systems: as per HPI      Physical Exam:    Patient is a 63 y.o. male. Patient appears to be in no distress. Breathing comfortably. He remains obese. Ambulates without assistance. HEENT: normal.  Neck supple, no adenopathy or bruits. Heart RR, no MGR. Lungs clear. Abd: Soft and nontender. Longitudinal surgical scar over his midline. Ext: no edema. Peripheral pulses: normal.  No neurologic deficits noted.     Lab Results   Component Value Date    WBC 4.9 04/08/2021    HGB 13.8 04/08/2021    HCT 40.6 04/08/2021     04/08/2021    CHOL 170 11/09/2020    TRIG 379 (H) 11/09/2020    HDL 42 11/09/2020    ALT 73 (H) 04/08/2021    AST 51 (H) 04/08/2021 TSH 2.320 01/23/2020    PSA 1.02 04/08/2021    LABA1C 10.1 12/03/2020    LABMICR 2133.6 (H) 04/08/2021      Lab Results   Component Value Date     04/08/2021    K 4.2 04/08/2021    CL 98 04/08/2021    CO2 26 04/08/2021    BUN 18 04/08/2021    CREATININE 1.0 04/08/2021    GLUCOSE 284 (H) 04/08/2021    CALCIUM 10.3 (H) 04/08/2021    PROT 7.7 04/08/2021    LABALBU 4.8 04/08/2021    BILITOT 0.4 04/08/2021    ALKPHOS 75 04/08/2021    AST 51 (H) 04/08/2021    ALT 73 (H) 04/08/2021    LABGLOM >60 04/08/2021    GFRAA >60 04/08/2021     ECG: Sinus rhythm with a rate of 97 bpm, otherwise normal.      Assessment:    Heart palpitations and chest heaviness. Etiology uncertain. Rule out myocardial ischemia.  -     EKG 12 Lead    Essential hypertension, controlled on current meds. Hepatic steatosis, longstanding and apparently confirmed on liver biopsy many years ago when he lived in [place]. Gastroesophageal reflux disease without esophagitis    Type 2 diabetes mellitus with diabetic nephropathy, without long-term current use of insulin ([address] not well controlled based on most recent fasting glucose 284 and hemoglobin A1c 10.1% 4 months ago. Stage 3a chronic kidney disease, stable and followed by his nephrologist.    Class 1 obesity due to excess calories with serious comorbidity and body mass index (BMI) of 34.0 to 34.9 in adult    Mixed hyperlipidemia, mainly hypertriglyceridemia. Discussion Notes: He will continue all of his current meds and supplements the same as listed on his med list.  He is strongly advised to his watch his diet better with respect to refined carbohydrates and cholesterol. He will follow-up with his nephrologist as per his instructions. Await further evaluation and recommendations by endocrinology. Will refer him to cardiology for evaluation of his recent symptoms of palpitations and chest heaviness especially in view of his multiple coronary risk factors.   Also will refer him to general surgery for further evaluation of his episodic severe GERD symptoms. He will need an endoscopic evaluation. He is scheduled for labs including CMP, hemoglobin A1c, lipid panel, and CBC, and will make further recommendations depending on those results.

## 2021-05-13 PROBLEM — E78.1 HYPERTRIGLYCERIDEMIA: Status: RESOLVED | Noted: 2020-12-03 | Resolved: 2021-05-13

## 2021-05-13 PROBLEM — E78.2 MIXED HYPERLIPIDEMIA: Status: ACTIVE | Noted: 2021-05-13

## 2021-05-18 ENCOUNTER — OFFICE VISIT (OUTPATIENT)
Dept: ENDOCRINOLOGY | Age: 61
End: 2021-05-18
Payer: COMMERCIAL

## 2021-05-18 VITALS
HEART RATE: 86 BPM | HEIGHT: 66 IN | DIASTOLIC BLOOD PRESSURE: 78 MMHG | OXYGEN SATURATION: 97 % | SYSTOLIC BLOOD PRESSURE: 142 MMHG | BODY MASS INDEX: 33.91 KG/M2 | WEIGHT: 211 LBS | RESPIRATION RATE: 18 BRPM

## 2021-05-18 DIAGNOSIS — E11.9 TYPE 2 DIABETES MELLITUS WITHOUT COMPLICATION, WITH LONG-TERM CURRENT USE OF INSULIN (HCC): Primary | ICD-10-CM

## 2021-05-18 DIAGNOSIS — E55.9 VITAMIN D DEFICIENCY: ICD-10-CM

## 2021-05-18 DIAGNOSIS — Z79.4 TYPE 2 DIABETES MELLITUS WITHOUT COMPLICATION, WITH LONG-TERM CURRENT USE OF INSULIN (HCC): Primary | ICD-10-CM

## 2021-05-18 LAB — HBA1C MFR BLD: 9.9 %

## 2021-05-18 PROCEDURE — 83036 HEMOGLOBIN GLYCOSYLATED A1C: CPT | Performed by: INTERNAL MEDICINE

## 2021-05-18 PROCEDURE — 99203 OFFICE O/P NEW LOW 30 MIN: CPT | Performed by: INTERNAL MEDICINE

## 2021-05-18 NOTE — PROGRESS NOTES
[address] [place] [address] Department of Endocrinology Diabetes and Metabolism   [address] [facility] [address]   Phone: [phone]  Fax: [phone]    Date of Service: 5/18/2021    Primary Care Physician: Maria Del Carmen Melgar MD  Referring physician: No ref. provider found  Provider: Jo De La Fuente MD     Reason for the visit:  Poorly controlled DM type 2     History of Present Illness: The history is provided by the patient. No  was used. Accuracy of the patient data is excellent.   Samira Steiner is a very pleasant 63 y.o. male seen today for diabetes management     Samira Steiner was diagnosed with diabetes around 20 years ago and currently on Metformin 1000 mg bid , Glyburide 5 mg BID    The patient has been checking blood sugar 1-2 times/day   Most recent A1c results summarized below  Lab Results   Component Value Date    LABA1C 9.9 05/18/2021    LABA1C 10.1 12/03/2020    LABA1C 7.9 01/27/2020     Patient has had no hypoglycemic episodes   The patient has been mindful of what has been eating and following diabetic diet as encouraged  I reviewed current medications and the patient has no issues with diabetes medications  Samira Steiner is up to date with eye exam and denied any history of diabetic retinopathy   The patient performs his own feet care  Microvascular complications:  No Retinopathy, Nephropathy or Neuropathy   Macrovascular complications: no CAD, PVD, or Stroke    PAST MEDICAL HISTORY   Past Medical History:   Diagnosis Date    Anxiety     Appendicitis 1/17/2020    Depression     Diabetes mellitus ([address]     Hyperlipidemia     Hypertension     Kidney stones     X2    Mesenteric hematoma 1/17/2020    Type 2 diabetes mellitus without complication ([address]        PAST SURGICAL HISTORY   Past Surgical History:   Procedure Laterality Date    COLONOSCOPY  11/04/2019    Dr. Oneida Gan Tubular adenoma; diverticulosis    LAPAROSCOPIC APPENDECTOMY N/A 1/17/2020 APPENDECTOMY LAPAROSCOPIC, EXPLORATORY LAPAROTOMY performed by Angelina Park MD at [address]   Tobacco:   reports that he has never smoked. He has never used smokeless tobacco.  Alcohol:   reports no history of alcohol use. Drugs:   reports no history of drug use.     FAMILY HISTORY   Family History   Problem Relation Age of Onset    Other Mother         Alive age 80 26; minimal arthritis    Arthritis Mother     Atrial Fibrillation Mother     Liver Disease Mother     Diabetes Father         Alive age 80 26    Hypertension Father     High Blood Pressure Father     Heart Surgery Father        ALLERGIES AND DRUG REACTIONS   No Known Allergies    CURRENT MEDICATIONS   Current Outpatient Medications   Medication Sig Dispense Refill    metFORMIN (GLUCOPHAGE) 1000 MG tablet Take 1,000 mg by mouth 2 times daily (with meals)      potassium citrate (UROCIT-K) 10 MEQ (1080 MG) extended release tablet Take 10 mEq by mouth 2 times daily (with meals)      allopurinol (ZYLOPRIM) 100 MG tablet Take 100 mg by mouth daily      amLODIPine-atorvastatatin (CADUET) 10-10 MG per tablet Take 1 tablet by mouth daily      losartan (COZAAR) 100 MG tablet Take 1 tablet by mouth daily 90 tablet 3    Continuous Blood Gluc  (DEXCOM G5  KIT) CAMRYN 1 kit by Does not apply route daily 1 Device 0    Continuous Blood Gluc  (DEXCOM G4 PLAT PED RCV/SHARE) CAMRYN by Does not apply route      sertraline (ZOLOFT) 100 MG tablet Take 1 tablet by mouth daily 90 tablet 3    glyBURIDE (DIABETA) 5 MG tablet Take 1 tablet by mouth 2 times daily 180 tablet 3    fenofibrate micronized (LOFIBRA) 134 MG capsule Take 1 capsule by mouth every morning (before breakfast) 90 capsule 3    metoprolol succinate (TOPROL XL) 50 MG extended release tablet Take 1 tablet by mouth 2 times daily 180 tablet 3    pravastatin (PRAVACHOL) 40 MG tablet Take 1 tablet by mouth daily 90 tablet 3    ibuprofen (ADVIL;MOTRIN) 600 MG tablet Take 1 tablet by mouth every 8 hours as needed for Pain 21 tablet 0    ketorolac (TORADOL) 10 MG tablet Take 1 tablet by mouth every 6 hours as needed for Pain 12 tablet 0    hydrochlorothiazide (HYDRODIURIL) 25 MG tablet Take 1 tablet by mouth daily 30 tablet 3    aspirin 81 MG tablet Take 81 mg by mouth daily       No current facility-administered medications for this visit. Review of Systems  Constitutional: No fever, no chills, no diaphoresis, no generalized weakness. HEENT: No blurred vision, No sore throat, no ear pain, no hair loss  Neck: denied any neck swelling, difficulty swallowing,   Cardio-pulmonary: No CP, SOB or palpitation, No orthopnea or PND. No cough or wheezing. GI: No N/V/D, no constipation, No abdominal pain, no melena or hematochezia   : Denied any dysuria, hematuria, flank pain, discharge, or incontinence. Skin: denied any rash, ulcer, Hirsute, or hyperpigmentation. MSK: denied any joint deformity, joint pain/swelling, muscle pain, or back pain. Neuro: no numbness, no tingling, no weakness, _    OBJECTIVE    BP (!) 142/78   Pulse 86   Resp 18   Ht 5' 6\" (1.676 m)   Wt 211 lb (95.7 kg)   SpO2 97%   BMI 34.06 kg/m²   BP Readings from Last 4 Encounters:   05/18/21 (!) 142/78   05/12/21 120/74   12/03/20 (!) 154/80   06/30/20 (!) 142/86     Wt Readings from Last 6 Encounters:   05/18/21 211 lb (95.7 kg)   05/12/21 211 lb (95.7 kg)   12/03/20 213 lb (96.6 kg)   06/30/20 212 lb (96.2 kg)   05/26/20 210 lb (95.3 kg)   03/16/20 210 lb (95.3 kg)     Physical examination:  General: awake alert, oriented x3, no abnormal position or movements. HEENT: normocephalic non-traumatic, no exophthalmos   Neck: supple, no LN enlargement, no thyromegaly, no thyroid tenderness, no JVD. Pulm: Clear equal air entry no added sounds, no wheezing or rhonchi    CVS: S1 + S2, no murmur, no heave.  Dorsalis pedis pulse palpable   Abd: soft lax, no tenderness, no organomegaly, audible bowel sounds. Skin: warm, no lesions, no rash.  No callus, no Ulcers, No acanthosis nigricans  Musculoskeletal: No back tenderness, no kyphosis/scoliosis    Neuro: CN intact, Monofilament sensation decreased bilateral , muscle power normal  Psych: normal mood, and affect    Review of Laboratory Data:  I personally reviewed the following lab:  Lab Results   Component Value Date/Time    WBC 4.9 04/08/2021 08:00 AM    RBC 4.68 04/08/2021 08:00 AM    HGB 13.8 04/08/2021 08:00 AM    HCT 40.6 04/08/2021 08:00 AM    MCV 86.8 04/08/2021 08:00 AM    MCH 29.5 04/08/2021 08:00 AM    MCHC 34.0 04/08/2021 08:00 AM    RDW 13.2 04/08/2021 08:00 AM     04/08/2021 08:00 AM    MPV 10.3 04/08/2021 08:00 AM      Lab Results   Component Value Date/Time     04/08/2021 08:00 AM    K 4.2 04/08/2021 08:00 AM    K 4.3 05/26/2020 08:55 AM    CO2 26 04/08/2021 08:00 AM    BUN 18 04/08/2021 08:00 AM    CREATININE 1.0 04/08/2021 08:00 AM    CALCIUM 10.3 (H) 04/08/2021 08:00 AM    LABGLOM >60 04/08/2021 08:00 AM    GFRAA >60 04/08/2021 08:00 AM      Lab Results   Component Value Date/Time    TSH 2.320 01/23/2020 05:20 AM     Lab Results   Component Value Date    LABA1C 9.9 05/18/2021    GLUCOSE 284 04/08/2021    MALBCR 1301.0 04/08/2021    LABMICR 2133.6 04/08/2021    LABCREA 164 04/08/2021     Lab Results   Component Value Date    LABA1C 9.9 05/18/2021    LABA1C 10.1 12/03/2020    LABA1C 7.9 01/27/2020     Lab Results   Component Value Date    TRIG 379 11/09/2020    HDL 42 11/09/2020    LDLCALC 52 11/09/2020    CHOL 170 11/09/2020     No results found for: [address] [employer] Kristie Edmondson, a 63 y.o.-old male seen in for the following issues     Diabetes Mellitus Type 2     · Patient's diabetes is uncontrol   · Will change DM regimen to Metformin 1000 mg bid , Glyburide 5 mg BID , Jardiance 25 mg daily   · The patient was advised to check blood sugars 4 times a day before meals and at bedtime and send BS readings to our office in a week. · Discussed with patient A1c and blood sugar goals  · Patient will need routine diabetes maintenance and prevention  · Diabetes labs before next visit     Dietary noncompliance   Discussed with patient the importance of eating consistent carbohydrate meals, avoiding high glycemic index food. Also, discussed with patient the risk and negative consequences of dietary noncompliance on blood glucose control, blood pressure and weight    Obesity   Discussed lifestyle changes including diet and exercise with patient in depth. Also discussed with patient cardiovascular risk associated with obesity    I personally reviewed external notes from PCP and other patient's care team providers, and personally interpreted labs associated with the above diagnosis. I also ordered labs to further assess and manage the above addressed medical conditions. Return in about 4 months (around 9/18/2021) for DM type 2, VitD deficiency. The above issues were reviewed with the patient who understood and agreed with the plan. Thank you for allowing us to participate in the care of this patient. Please do not hesitate to contact us with any additional questions. Diagnosis Orders   1. Type 2 diabetes mellitus without complication, with long-term current use of insulin ([facility])  POCT glycosylated hemoglobin (Hb A1C)    Basic Metabolic Panel    Hemoglobin A1C    Microalbumin / Creatinine Urine Ratio   2. Vitamin D deficiency  Vitamin D 25 Hydroxy     Arik Zayas MD  Endocrinologist, [facility] - [facility] Diabetes Care and Endocrinology   [address] [address]   Phone: [phone]  Fax: [phone]  --------------------------------------------  An electronic signature was used to authenticate this note.  Ladonna Campos MD on 5/18/2021 at 12:57 PM

## 2021-05-26 DIAGNOSIS — E11.9 TYPE 2 DIABETES MELLITUS WITHOUT COMPLICATION, WITH LONG-TERM CURRENT USE OF INSULIN (HCC): Primary | ICD-10-CM

## 2021-05-26 DIAGNOSIS — Z79.4 TYPE 2 DIABETES MELLITUS WITHOUT COMPLICATION, WITH LONG-TERM CURRENT USE OF INSULIN (HCC): Primary | ICD-10-CM

## 2021-05-26 RX ORDER — FLASH GLUCOSE SENSOR
KIT MISCELLANEOUS
Qty: 3 EACH | Refills: 5 | Status: ON HOLD
Start: 2021-05-26 | End: 2021-07-10 | Stop reason: HOSPADM

## 2021-05-26 RX ORDER — EMPAGLIFLOZIN 25 MG/1
25 TABLET, FILM COATED ORAL DAILY
Qty: 30 TABLET | Refills: 3 | Status: SHIPPED
Start: 2021-05-26 | End: 2021-09-17

## 2021-06-10 ENCOUNTER — TELEPHONE (OUTPATIENT)
Dept: CARDIOLOGY CLINIC | Age: 61
End: 2021-06-10

## 2021-06-10 NOTE — TELEPHONE ENCOUNTER
Pt called and said he ws returning your call. I saw that he was a referral, but I didn't know if Lauren Goodson or you already spoke to doc. I was told to have him call back Monday.  Pt can be reached at [phone]

## 2021-06-24 RX ORDER — HYDROCHLOROTHIAZIDE 25 MG/1
25 TABLET ORAL DAILY
Qty: 90 TABLET | Refills: 3 | Status: SHIPPED
Start: 2021-06-24 | End: 2021-06-28

## 2021-06-28 ENCOUNTER — OFFICE VISIT (OUTPATIENT)
Dept: PRIMARY CARE CLINIC | Age: 61
End: 2021-06-28
Payer: COMMERCIAL

## 2021-06-28 VITALS
RESPIRATION RATE: 18 BRPM | DIASTOLIC BLOOD PRESSURE: 88 MMHG | TEMPERATURE: 97.2 F | HEIGHT: 66 IN | BODY MASS INDEX: 33.75 KG/M2 | WEIGHT: 210 LBS | OXYGEN SATURATION: 98 % | SYSTOLIC BLOOD PRESSURE: 120 MMHG | HEART RATE: 76 BPM

## 2021-06-28 DIAGNOSIS — L03.113 CELLULITIS OF RIGHT UPPER EXTREMITY: Primary | ICD-10-CM

## 2021-06-28 PROCEDURE — 99213 OFFICE O/P EST LOW 20 MIN: CPT | Performed by: INTERNAL MEDICINE

## 2021-06-28 RX ORDER — CEPHALEXIN 500 MG/1
500 CAPSULE ORAL 3 TIMES DAILY
Qty: 15 CAPSULE | Refills: 0 | Status: SHIPPED
Start: 2021-06-28 | End: 2021-06-29

## 2021-06-28 RX ORDER — TAMSULOSIN HYDROCHLORIDE 0.4 MG/1
0.4 CAPSULE ORAL DAILY
COMMUNITY
End: 2021-11-04 | Stop reason: SDUPTHER

## 2021-06-28 RX ORDER — AMLODIPINE BESYLATE 10 MG/1
10 TABLET ORAL DAILY
COMMUNITY

## 2021-06-28 SDOH — ECONOMIC STABILITY: FOOD INSECURITY: WITHIN THE PAST 12 MONTHS, THE FOOD YOU BOUGHT JUST DIDN'T LAST AND YOU DIDN'T HAVE MONEY TO GET MORE.: NEVER TRUE

## 2021-06-28 SDOH — ECONOMIC STABILITY: FOOD INSECURITY: WITHIN THE PAST 12 MONTHS, YOU WORRIED THAT YOUR FOOD WOULD RUN OUT BEFORE YOU GOT MONEY TO BUY MORE.: NEVER TRUE

## 2021-06-28 ASSESSMENT — SOCIAL DETERMINANTS OF HEALTH (SDOH): HOW HARD IS IT FOR YOU TO PAY FOR THE VERY BASICS LIKE FOOD, HOUSING, MEDICAL CARE, AND HEATING?: NOT HARD AT ALL

## 2021-06-28 NOTE — PROGRESS NOTES
Benjamín Vishal  6/28/21     Chief Complaint   Patient presents with    Arm Pain     right arm swollen hot and red /bodyaches chills from Dane sensor         No Known Allergies     Current Outpatient Medications   Medication Sig Dispense Refill    tamsulosin (FLOMAX) 0.4 MG capsule Take 0.4 mg by mouth daily      amLODIPine (NORVASC) 10 MG tablet Take 10 mg by mouth daily      cephALEXin (KEFLEX) 500 MG capsule Take 1 capsule by mouth 3 times daily for 5 days 15 capsule 0    empagliflozin (JARDIANCE) 25 MG tablet Take 25 mg by mouth daily 30 tablet 3    Continuous Blood Gluc Sensor (FREESTYLE ROCIO 2 SENSOR) MISC To change every 14 days 3 each 5    metFORMIN (GLUCOPHAGE) 1000 MG tablet Take 1,000 mg by mouth 2 times daily (with meals)      potassium citrate (UROCIT-K) 10 MEQ (1080 MG) extended release tablet Take 10 mEq by mouth 2 times daily (with meals)      allopurinol (ZYLOPRIM) 100 MG tablet Take 100 mg by mouth daily      losartan (COZAAR) 100 MG tablet Take 1 tablet by mouth daily 90 tablet 3    sertraline (ZOLOFT) 100 MG tablet Take 1 tablet by mouth daily 90 tablet 3    glyBURIDE (DIABETA) 5 MG tablet Take 1 tablet by mouth 2 times daily 180 tablet 3    fenofibrate micronized (LOFIBRA) 134 MG capsule Take 1 capsule by mouth every morning (before breakfast) 90 capsule 3    metoprolol succinate (TOPROL XL) 50 MG extended release tablet Take 1 tablet by mouth 2 times daily 180 tablet 3    pravastatin (PRAVACHOL) 40 MG tablet Take 1 tablet by mouth daily 90 tablet 3    aspirin 81 MG tablet Take 81 mg by mouth daily       No current facility-administered medications for this visit. HPI: Patient comes in complaining of an area of pain in swelling and redness over the right upper arm at the site where he had placed his freestyle rocio patch. He states he felt some chills or had a low-grade fever.   He is following up with his endocrinologist for management of his diabetes and remains on all of his usual meds and supplements the same as listed on his med list, which was reviewed with him. He denies any chest pain or shortness of breath. Review of Systems: as per HPI      Physical Exam:    Patient is a 63 y.o. male. Patient appears to be in no distress. Breathing comfortably. Ambulates without assistance. HEENT: normal.  Neck supple, no adenopathy or bruits. Heart RR, no MGR. Lungs clear. Abd: normal  Ext: There is a area of erythema, induration, and increased warmth over the posterior aspect of his right upper arm in the area where he had the freestyle bon patch in place. Peripheral pulses: normal.  No neurologic deficits noted. Assessment:    [facility] was seen today for arm pain. Diagnoses and all orders for this visit:    Cellulitis of right upper extremity  -     cephALEXin (KEFLEX) 500 MG capsule; Take 1 capsule by mouth 3 times daily for 5 days          Discussion Notes: We will start patient on cephalexin 500 mg 3 times daily x5 days. He will leave his freestyle bon patch off until symptoms are resolved. He will go to the emergency room if his symptoms get worse especially if he develops a high fever or chills. Otherwise will call in a few days if his symptoms are not improving. He will follow-up with his endocrinologist for management of his diabetes mellitus. He may need to try another system such as the Dexcom where the sensor is placed on his abdominal wall.

## 2021-06-29 ENCOUNTER — TELEPHONE (OUTPATIENT)
Dept: PRIMARY CARE CLINIC | Age: 61
End: 2021-06-29

## 2021-06-29 ENCOUNTER — HOSPITAL ENCOUNTER (EMERGENCY)
Age: 61
Discharge: HOME | End: 2021-06-29
Attending: EMERGENCY MEDICINE
Payer: COMMERCIAL

## 2021-06-29 VITALS
WEIGHT: 210 LBS | HEIGHT: 66 IN | SYSTOLIC BLOOD PRESSURE: 145 MMHG | TEMPERATURE: 97.9 F | OXYGEN SATURATION: 98 % | HEART RATE: 78 BPM | BODY MASS INDEX: 33.75 KG/M2 | RESPIRATION RATE: 16 BRPM | DIASTOLIC BLOOD PRESSURE: 87 MMHG

## 2021-06-29 DIAGNOSIS — L02.413 ABSCESS OF ARM, RIGHT: Primary | ICD-10-CM

## 2021-06-29 PROCEDURE — 99283 EMERGENCY DEPT VISIT LOW MDM: CPT

## 2021-06-29 PROCEDURE — 2500000003 HC RX 250 WO HCPCS: Performed by: EMERGENCY MEDICINE

## 2021-06-29 PROCEDURE — 10060 I&D ABSCESS SIMPLE/SINGLE: CPT

## 2021-06-29 RX ORDER — CEPHALEXIN 500 MG/1
500 CAPSULE ORAL EVERY 6 HOURS
Qty: 16 CAPSULE | Refills: 0 | Status: SHIPPED | OUTPATIENT
Start: 2021-06-29 | End: 2021-07-03

## 2021-06-29 RX ORDER — LIDOCAINE HYDROCHLORIDE AND EPINEPHRINE 10; 10 MG/ML; UG/ML
20 INJECTION, SOLUTION INFILTRATION; PERINEURAL ONCE
Status: COMPLETED | OUTPATIENT
Start: 2021-06-29 | End: 2021-06-29

## 2021-06-29 RX ORDER — DOXYCYCLINE HYCLATE 100 MG
100 TABLET ORAL 2 TIMES DAILY
Qty: 14 TABLET | Refills: 0 | Status: SHIPPED | OUTPATIENT
Start: 2021-06-29 | End: 2021-07-06

## 2021-06-29 RX ADMIN — LIDOCAINE HYDROCHLORIDE AND EPINEPHRINE 20 ML: 10; 10 INJECTION, SOLUTION INFILTRATION; PERINEURAL at 17:08

## 2021-06-29 ASSESSMENT — PAIN SCALES - GENERAL
PAINLEVEL_OUTOF10: 5
PAINLEVEL_OUTOF10: 5

## 2021-06-29 ASSESSMENT — PAIN DESCRIPTION - ORIENTATION: ORIENTATION: RIGHT;UPPER

## 2021-06-29 ASSESSMENT — PAIN DESCRIPTION - LOCATION: LOCATION: ARM

## 2021-06-29 ASSESSMENT — PAIN DESCRIPTION - DESCRIPTORS: DESCRIPTORS: CONSTANT;DISCOMFORT

## 2021-06-29 ASSESSMENT — PAIN DESCRIPTION - PAIN TYPE: TYPE: ACUTE PAIN

## 2021-06-29 NOTE — ED PROVIDER NOTES
FIRST PROVIDER CONTACT ASSESSMENT NOTE        Department of Emergency Medicine            ED  First Provider Note            6/29/21  3:02 PM EDT    Chief Complaint: Wound Check (right arm infection from glucose monitor, started antibiotics yesteday)      History of Present Illness:    Dimitri Cuenca is a 63 y.o. male who presents to the emergency department for posterior right upper arm swelling and pain. States this occurred at site of his glucose monitor. Seen by PCP yesterday and started on abs. States he feels that swelling and pain are worse. No vomiting or fever. Focused Screening Exam:  Constitutional:  Alert, appears stated age and is in no distress. *ALLERGIES*     Patient has no known allergies.      ED Triage Vitals   BP Temp Temp Source Pulse Resp SpO2 Height Weight   06/29/21 1456 06/29/21 1456 06/29/21 1456 06/29/21 1450 06/29/21 1450 06/29/21 1450 06/29/21 1450 06/29/21 1450   (!) 161/89 97.9 °F (36.6 °C) Temporal 84 14 98 % 5' 6\" (1.676 m) 210 lb (95.3 kg)        Initial Plan of Care:  Initiate Treatment-Testing, Proceed toTreatment Area When Bed Available for ED Attending/MLP to Continue Care    -----------------END OF FIRST PROVIDER CONTACT ASSESSMENT NOTE--------------  Electronically signed by Martha Tariq PA-C   DD: 6/29/21       Martha Tariq PA-C  06/29/21 [id number]

## 2021-06-29 NOTE — TELEPHONE ENCOUNTER
Pt calling he is not feeling any better, does not feel well, feverish, red swollen arm he has had 4 doses of antibiotic not better

## 2021-06-29 NOTE — ED PROVIDER NOTES
HPI:  6/29/21, Time: 4:30 PM EDT         Dimitri Cuenca is a 63 y.o. male presenting to the ED for evaluation of wound to right arm. Symptoms have been moderate in severity and constant. Symptoms are exacerbated by palpation and alleviated by nothing. Describes it as an ache to his right upper extremity at the site of where he previously had a glucose monitor placed. He followed up with his PCP who initiated him on cephalexin 3 times daily yesterday. He has been taking it without relief. No drainage from the wound. No fevers, nausea, or vomiting. Review of Systems:   Pertinent positives and negatives are stated within HPI, all other systems reviewed and are negative.          --------------------------------------------- PAST HISTORY ---------------------------------------------  Past Medical History:  has a past medical history of Anxiety, Appendicitis, Depression, Diabetes mellitus ([facility] [address] Hyperlipidemia, Hypertension, Kidney stones, Mesenteric hematoma, and [facility] Past Surgical History:  has a past surgical history that includes Colonoscopy (11/04/2019) and laparoscopic appendectomy (N/A, 1/17/2020). Social History:  reports that he has never smoked. He has never used smokeless tobacco. He reports that he does not drink alcohol and does not use drugs. Family History: family history includes Arthritis in his mother; Atrial Fibrillation in his mother; Diabetes in his father; Heart Surgery in his father; High Blood Pressure in his father; Hypertension in his father; Liver Disease in his mother; Other in his mother. The patients home medications have been reviewed. Allergies: Patient has no known allergies.     -------------------------------------------------- RESULTS -------------------------------------------------  All laboratory and radiology results have been personally reviewed by myself   LABS:  No results found for this visit on 06/29/21. RADIOLOGY:  Interpreted by Radiologist.  No orders to display       ------------------------- NURSING NOTES AND VITALS REVIEWED ---------------------------   The nursing notes within the ED encounter and vital signs as below have been reviewed. BP (!) 145/87   Pulse 78   Temp 97.9 °F (36.6 °C) (Temporal)   Resp 16   Ht 5' 6\" (1.676 m)   Wt 210 lb (95.3 kg)   SpO2 98%   BMI 33.89 kg/m²   Oxygen Saturation Interpretation: Normal      ---------------------------------------------------PHYSICAL EXAM--------------------------------------      Constitutional/General: Alert and oriented x3, well appearing, non toxic in NAD  Head: Normocephalic and atraumatic  Mouth: Oropharynx clear, handling secretions, no trismus  Neck: Supple, full ROM,   Pulmonary: Not in respiratory distress  Extremities: Moves all extremities x 4. Warm and well perfused. RUE-area of warmth and erythema to upper lateral arm with no active drainage, area of fluctuance, no crepitus or streaking, soft and easily compressible compartments. Skin: warm and dry without rash  Neurologic: GCS 15, no focal motor or sensory deficits   Psych: Normal Affect. Behavior normal.      ------------------------------ ED COURSE/MEDICAL DECISION MAKING----------------------  Medications   lidocaine-EPINEPHrine 1 %-1:[id number] injection 20 mL (20 mLs Intradermal Given by Other 6/29/21 1708)     PROCEDURE  6/29/21       Time: see nursing note    INCISION AND DRAINAGE  Risks, benefits and alternatives (for applicable procedures below) described. Performed By: EM Attending Physician, Dr. Hermelinda Vick    Indication: Abscess located on right arm  Informed consent obtained: The patient provided verbal consent for this procedure. .  Prep: The skin was wiped with isopropyl alcohol and draped in a sterile fashion. Local Anesthesia:  obtained with Lidocaine 1% with epinephrine.   Incision: The abscess was Incised by scalpal and moderate purulent fluid was drained. A wound culture was not obtained. The wound  was not irrigated and was not packed with iodoform gauze. The wound was then covered with a sterile dressing. Patient tolerated the procedure well. Medical Decision Making/ED COURSE:   Patient is a 79-year-old male presenting for evaluation of wound to right arm. He was noted to have an abscess with surrounding cellulitis. No streaking or evidence of compartment syndrome. Bedside ultrasound confirmed an abscess. Abscess was incised and drained with purulent drainage expressed. He tolerated the procedure well. Will increase his Keflex to 4 times daily and add doxycycline for possible MRSA. He has no systemic symptoms, and he is hemodynamically stable. He is appropriate for discharge home. Advised PCP follow-up. Supportive care measures and strict ED return precautions discussed. Patient remained hemodynamically stable throughout ED course. Counseling: The emergency provider has spoken with the patient and discussed todays results, in addition to providing specific details for the plan of care and counseling regarding the diagnosis and prognosis. Questions are answered at this time and they are agreeable with the plan.      --------------------------------- IMPRESSION AND DISPOSITION ---------------------------------    IMPRESSION  1. Abscess of arm, right        DISPOSITION  Disposition: Discharge to home  Patient condition is stable      NOTE: This report was transcribed using voice recognition software.  Every effort was made to ensure accuracy; however, inadvertent computerized transcription errors may be present    ILucas MD, am the primary provider of this record       Lucas Alfaro MD  06/29/21 2506

## 2021-07-07 ENCOUNTER — OFFICE VISIT (OUTPATIENT)
Dept: PRIMARY CARE CLINIC | Age: 61
End: 2021-07-07
Payer: COMMERCIAL

## 2021-07-07 VITALS
BODY MASS INDEX: 33.75 KG/M2 | OXYGEN SATURATION: 97 % | WEIGHT: 210 LBS | HEART RATE: 78 BPM | RESPIRATION RATE: 18 BRPM | SYSTOLIC BLOOD PRESSURE: 138 MMHG | DIASTOLIC BLOOD PRESSURE: 70 MMHG | TEMPERATURE: 97.4 F | HEIGHT: 66 IN

## 2021-07-07 DIAGNOSIS — L02.413 ABSCESS OF RIGHT ARM: Primary | ICD-10-CM

## 2021-07-07 PROCEDURE — 99213 OFFICE O/P EST LOW 20 MIN: CPT | Performed by: INTERNAL MEDICINE

## 2021-07-07 RX ORDER — DOXYCYCLINE HYCLATE 100 MG
100 TABLET ORAL 2 TIMES DAILY
Qty: 14 TABLET | Refills: 0 | Status: SHIPPED | OUTPATIENT
Start: 2021-07-07 | End: 2021-07-14

## 2021-07-07 NOTE — PROGRESS NOTES
Melparminder Feltjack  7/7/21     Chief Complaint   Patient presents with    Arm Pain     painfull ,arm swollen, red , and hot         No Known Allergies     Current Outpatient Medications   Medication Sig Dispense Refill    doxycycline hyclate (VIBRA-TABS) 100 MG tablet Take 1 tablet by mouth 2 times daily for 7 days 14 tablet 0    tamsulosin (FLOMAX) 0.4 MG capsule Take 0.4 mg by mouth daily      amLODIPine (NORVASC) 10 MG tablet Take 10 mg by mouth daily      empagliflozin (JARDIANCE) 25 MG tablet Take 25 mg by mouth daily 30 tablet 3    Continuous Blood Gluc Sensor (FREESTYLE ROCIO 2 SENSOR) MISC To change every 14 days 3 each 5    metFORMIN (GLUCOPHAGE) 1000 MG tablet Take 1,000 mg by mouth 2 times daily (with meals)      potassium citrate (UROCIT-K) 10 MEQ (1080 MG) extended release tablet Take 10 mEq by mouth 2 times daily (with meals)      allopurinol (ZYLOPRIM) 100 MG tablet Take 100 mg by mouth daily      losartan (COZAAR) 100 MG tablet Take 1 tablet by mouth daily 90 tablet 3    sertraline (ZOLOFT) 100 MG tablet Take 1 tablet by mouth daily 90 tablet 3    glyBURIDE (DIABETA) 5 MG tablet Take 1 tablet by mouth 2 times daily 180 tablet 3    fenofibrate micronized (LOFIBRA) 134 MG capsule Take 1 capsule by mouth every morning (before breakfast) 90 capsule 3    metoprolol succinate (TOPROL XL) 50 MG extended release tablet Take 1 tablet by mouth 2 times daily 180 tablet 3    pravastatin (PRAVACHOL) 40 MG tablet Take 1 tablet by mouth daily 90 tablet 3    aspirin 81 MG tablet Take 81 mg by mouth daily       No current facility-administered medications for this visit. HPI: Patient returns today for follow-up visit regarding abscess in his right arm. He was in the ER on 6/29/2021 and the area was lanced and small amount of pus was apparently obtained.   He was kept on antibiotics with cephalexin and doxycycline and today returns stating that the area is still red and swollen and tender and painful. The other areas of inflammation right arm have improved. He denies any fever or chills. Review of Systems: as per HPI      Physical Exam:    Patient is a 63 y.o. male. Patient appears to be in no distress. Breathing comfortably. Ambulates without assistance. He is alert and oriented. There is a 5 cm diameter area of induration and erythema which is raised in the central area over the posterior aspect of his right upper arm. .  It is not fluctuant and there is no drainage. It is moderately tender. I was unable to find any culture results. Assessment:    Puneet Devine was seen today for arm pain. Diagnoses and all orders for this visit:    Abscess of right arm  -     doxycycline hyclate (VIBRA-TABS) 100 MG tablet; Take 1 tablet by mouth 2 times daily for 7 days          Discussion Notes: Patient advised to return to the ER to have the abscess I&D'ed. He is going to wait till tomorrow and then will go to the emergency room and he would like me to asked him to call the surgeon. He is requesting Dr. Belinda Acuña. In the meantime we will keep him on doxycycline 100 mg twice daily. He is advised to go directly to the ER if he develops any fever or chills or if the area of erythema and swelling right arm gets worse.

## 2021-07-08 ENCOUNTER — APPOINTMENT (OUTPATIENT)
Dept: CT IMAGING | Age: 61
DRG: 603 | End: 2021-07-08
Payer: COMMERCIAL

## 2021-07-08 ENCOUNTER — HOSPITAL ENCOUNTER (INPATIENT)
Age: 61
LOS: 1 days | Discharge: HOME | DRG: 603 | End: 2021-07-10
Attending: EMERGENCY MEDICINE | Admitting: INTERNAL MEDICINE
Payer: COMMERCIAL

## 2021-07-08 DIAGNOSIS — L03.113 CELLULITIS OF RIGHT UPPER EXTREMITY: Primary | ICD-10-CM

## 2021-07-08 DIAGNOSIS — M60.021: ICD-10-CM

## 2021-07-08 DIAGNOSIS — L02.413 ABSCESS OF RIGHT ARM: ICD-10-CM

## 2021-07-08 PROBLEM — M60.9 MYOSITIS: Status: ACTIVE | Noted: 2021-07-08

## 2021-07-08 PROBLEM — M60.000: Status: ACTIVE | Noted: 2021-07-08

## 2021-07-08 LAB
ANION GAP SERPL CALCULATED.3IONS-SCNC: 12 MMOL/L (ref 7–16)
BASOPHILS ABSOLUTE: 0.03 E9/L (ref 0–0.2)
BASOPHILS RELATIVE PERCENT: 0.5 % (ref 0–2)
BUN BLDV-MCNC: 24 MG/DL (ref 6–20)
CALCIUM SERPL-MCNC: 10.5 MG/DL (ref 8.6–10.2)
CHLORIDE BLD-SCNC: 102 MMOL/L (ref 98–107)
CO2: 25 MMOL/L (ref 22–29)
CREAT SERPL-MCNC: 1.1 MG/DL (ref 0.7–1.2)
EOSINOPHILS ABSOLUTE: 0.13 E9/L (ref 0.05–0.5)
EOSINOPHILS RELATIVE PERCENT: 2.1 % (ref 0–6)
GFR AFRICAN AMERICAN: >60
GFR NON-AFRICAN AMERICAN: >60 ML/MIN/1.73
GLUCOSE BLD-MCNC: 72 MG/DL (ref 74–99)
HCT VFR BLD CALC: 40.2 % (ref 37–54)
HEMOGLOBIN: 13.3 G/DL (ref 12.5–16.5)
IMMATURE GRANULOCYTES #: 0.07 E9/L
IMMATURE GRANULOCYTES %: 1.1 % (ref 0–5)
LACTIC ACID, SEPSIS: 2.1 MMOL/L (ref 0.5–1.9)
LACTIC ACID, SEPSIS: 2.3 MMOL/L (ref 0.5–1.9)
LYMPHOCYTES ABSOLUTE: 1.02 E9/L (ref 1.5–4)
LYMPHOCYTES RELATIVE PERCENT: 16.4 % (ref 20–42)
MCH RBC QN AUTO: 29 PG (ref 26–35)
MCHC RBC AUTO-ENTMCNC: 33.1 % (ref 32–34.5)
MCV RBC AUTO: 87.6 FL (ref 80–99.9)
METER GLUCOSE: 139 MG/DL (ref 74–99)
MONOCYTES ABSOLUTE: 0.58 E9/L (ref 0.1–0.95)
MONOCYTES RELATIVE PERCENT: 9.3 % (ref 2–12)
NEUTROPHILS ABSOLUTE: 4.39 E9/L (ref 1.8–7.3)
NEUTROPHILS RELATIVE PERCENT: 70.6 % (ref 43–80)
PDW BLD-RTO: 13.2 FL (ref 11.5–15)
PLATELET # BLD: 181 E9/L (ref 130–450)
PMV BLD AUTO: 9.4 FL (ref 7–12)
POTASSIUM REFLEX MAGNESIUM: 4.2 MMOL/L (ref 3.5–5)
RBC # BLD: 4.59 E12/L (ref 3.8–5.8)
SODIUM BLD-SCNC: 139 MMOL/L (ref 132–146)
WBC # BLD: 6.2 E9/L (ref 4.5–11.5)

## 2021-07-08 PROCEDURE — 6360000002 HC RX W HCPCS: Performed by: INTERNAL MEDICINE

## 2021-07-08 PROCEDURE — 83605 ASSAY OF LACTIC ACID: CPT

## 2021-07-08 PROCEDURE — 80048 BASIC METABOLIC PNL TOTAL CA: CPT

## 2021-07-08 PROCEDURE — 2580000003 HC RX 258: Performed by: INTERNAL MEDICINE

## 2021-07-08 PROCEDURE — 96365 THER/PROPH/DIAG IV INF INIT: CPT

## 2021-07-08 PROCEDURE — 85025 COMPLETE CBC W/AUTO DIFF WBC: CPT

## 2021-07-08 PROCEDURE — 96372 THER/PROPH/DIAG INJ SC/IM: CPT

## 2021-07-08 PROCEDURE — 87040 BLOOD CULTURE FOR BACTERIA: CPT

## 2021-07-08 PROCEDURE — 82962 GLUCOSE BLOOD TEST: CPT

## 2021-07-08 PROCEDURE — G0378 HOSPITAL OBSERVATION PER HR: HCPCS

## 2021-07-08 PROCEDURE — 6360000002 HC RX W HCPCS: Performed by: EMERGENCY MEDICINE

## 2021-07-08 PROCEDURE — 2580000003 HC RX 258: Performed by: EMERGENCY MEDICINE

## 2021-07-08 PROCEDURE — 99283 EMERGENCY DEPT VISIT LOW MDM: CPT

## 2021-07-08 PROCEDURE — 6370000000 HC RX 637 (ALT 250 FOR IP): Performed by: INTERNAL MEDICINE

## 2021-07-08 PROCEDURE — 6360000004 HC RX CONTRAST MEDICATION: Performed by: RADIOLOGY

## 2021-07-08 PROCEDURE — 73201 CT UPPER EXTREMITY W/DYE: CPT

## 2021-07-08 RX ORDER — SODIUM CHLORIDE 9 MG/ML
INJECTION, SOLUTION INTRAVENOUS CONTINUOUS
Status: DISCONTINUED | OUTPATIENT
Start: 2021-07-08 | End: 2021-07-10 | Stop reason: HOSPADM

## 2021-07-08 RX ORDER — LOSARTAN POTASSIUM 50 MG/1
100 TABLET ORAL DAILY
Status: DISCONTINUED | OUTPATIENT
Start: 2021-07-08 | End: 2021-07-10 | Stop reason: HOSPADM

## 2021-07-08 RX ORDER — DEXTROSE MONOHYDRATE 50 MG/ML
100 INJECTION, SOLUTION INTRAVENOUS PRN
Status: DISCONTINUED | OUTPATIENT
Start: 2021-07-08 | End: 2021-07-10 | Stop reason: HOSPADM

## 2021-07-08 RX ORDER — MORPHINE SULFATE 2 MG/ML
2 INJECTION, SOLUTION INTRAMUSCULAR; INTRAVENOUS EVERY 4 HOURS PRN
Status: DISCONTINUED | OUTPATIENT
Start: 2021-07-08 | End: 2021-07-10 | Stop reason: HOSPADM

## 2021-07-08 RX ORDER — AMLODIPINE BESYLATE 10 MG/1
10 TABLET ORAL DAILY
Status: DISCONTINUED | OUTPATIENT
Start: 2021-07-08 | End: 2021-07-10 | Stop reason: HOSPADM

## 2021-07-08 RX ORDER — SERTRALINE HYDROCHLORIDE 100 MG/1
100 TABLET, FILM COATED ORAL DAILY
Status: DISCONTINUED | OUTPATIENT
Start: 2021-07-08 | End: 2021-07-10 | Stop reason: HOSPADM

## 2021-07-08 RX ORDER — DEXTROSE MONOHYDRATE 25 G/50ML
12.5 INJECTION, SOLUTION INTRAVENOUS PRN
Status: DISCONTINUED | OUTPATIENT
Start: 2021-07-08 | End: 2021-07-10 | Stop reason: HOSPADM

## 2021-07-08 RX ORDER — FENOFIBRATE 160 MG/1
160 TABLET ORAL DAILY
Status: DISCONTINUED | OUTPATIENT
Start: 2021-07-08 | End: 2021-07-10 | Stop reason: HOSPADM

## 2021-07-08 RX ORDER — GLYBURIDE 5 MG/1
5 TABLET ORAL 2 TIMES DAILY
Status: DISCONTINUED | OUTPATIENT
Start: 2021-07-08 | End: 2021-07-10 | Stop reason: HOSPADM

## 2021-07-08 RX ORDER — ASPIRIN 81 MG/1
81 TABLET ORAL DAILY
Status: DISCONTINUED | OUTPATIENT
Start: 2021-07-08 | End: 2021-07-10 | Stop reason: HOSPADM

## 2021-07-08 RX ORDER — PRAVASTATIN SODIUM 20 MG
40 TABLET ORAL NIGHTLY
Status: DISCONTINUED | OUTPATIENT
Start: 2021-07-08 | End: 2021-07-10 | Stop reason: HOSPADM

## 2021-07-08 RX ORDER — METOPROLOL SUCCINATE 50 MG/1
50 TABLET, EXTENDED RELEASE ORAL 2 TIMES DAILY
Status: DISCONTINUED | OUTPATIENT
Start: 2021-07-08 | End: 2021-07-10 | Stop reason: HOSPADM

## 2021-07-08 RX ORDER — PRAVASTATIN SODIUM 20 MG
40 TABLET ORAL DAILY
Status: DISCONTINUED | OUTPATIENT
Start: 2021-07-08 | End: 2021-07-08

## 2021-07-08 RX ORDER — ACETAMINOPHEN 325 MG/1
650 TABLET ORAL EVERY 4 HOURS PRN
Status: DISCONTINUED | OUTPATIENT
Start: 2021-07-08 | End: 2021-07-10 | Stop reason: HOSPADM

## 2021-07-08 RX ORDER — HYDROCODONE BITARTRATE AND ACETAMINOPHEN 5; 325 MG/1; MG/1
1 TABLET ORAL EVERY 4 HOURS PRN
Status: DISCONTINUED | OUTPATIENT
Start: 2021-07-08 | End: 2021-07-10 | Stop reason: HOSPADM

## 2021-07-08 RX ORDER — ALLOPURINOL 100 MG/1
100 TABLET ORAL DAILY
Status: DISCONTINUED | OUTPATIENT
Start: 2021-07-08 | End: 2021-07-10 | Stop reason: HOSPADM

## 2021-07-08 RX ORDER — PANTOPRAZOLE SODIUM 40 MG/1
40 TABLET, DELAYED RELEASE ORAL
Status: DISCONTINUED | OUTPATIENT
Start: 2021-07-09 | End: 2021-07-10 | Stop reason: HOSPADM

## 2021-07-08 RX ORDER — NICOTINE POLACRILEX 4 MG
15 LOZENGE BUCCAL PRN
Status: DISCONTINUED | OUTPATIENT
Start: 2021-07-08 | End: 2021-07-10 | Stop reason: HOSPADM

## 2021-07-08 RX ORDER — TAMSULOSIN HYDROCHLORIDE 0.4 MG/1
0.4 CAPSULE ORAL DAILY
Status: DISCONTINUED | OUTPATIENT
Start: 2021-07-08 | End: 2021-07-10 | Stop reason: HOSPADM

## 2021-07-08 RX ORDER — POTASSIUM CITRATE 5 MEQ/1
10 TABLET, EXTENDED RELEASE ORAL 2 TIMES DAILY WITH MEALS
Status: DISCONTINUED | OUTPATIENT
Start: 2021-07-08 | End: 2021-07-10 | Stop reason: HOSPADM

## 2021-07-08 RX ADMIN — AMLODIPINE BESYLATE 10 MG: 10 TABLET ORAL at 21:30

## 2021-07-08 RX ADMIN — PRAVASTATIN SODIUM 40 MG: 20 TABLET ORAL at 21:31

## 2021-07-08 RX ADMIN — IOPAMIDOL 75 ML: 755 INJECTION, SOLUTION INTRAVENOUS at 17:28

## 2021-07-08 RX ADMIN — CEFEPIME HYDROCHLORIDE 2000 MG: 2 INJECTION, POWDER, FOR SOLUTION INTRAVENOUS at 19:51

## 2021-07-08 RX ADMIN — SODIUM CHLORIDE: 9 INJECTION, SOLUTION INTRAVENOUS at 21:38

## 2021-07-08 RX ADMIN — VANCOMYCIN HYDROCHLORIDE 2000 MG: 10 INJECTION, POWDER, LYOPHILIZED, FOR SOLUTION INTRAVENOUS at 21:34

## 2021-07-08 RX ADMIN — POTASSIUM CITRATE 10 MEQ: 5 TABLET ORAL at 21:30

## 2021-07-08 RX ADMIN — ENOXAPARIN SODIUM 40 MG: 40 INJECTION SUBCUTANEOUS at 21:31

## 2021-07-08 RX ADMIN — METOPROLOL SUCCINATE 50 MG: 50 TABLET, EXTENDED RELEASE ORAL at 21:30

## 2021-07-08 ASSESSMENT — PAIN SCALES - GENERAL: PAINLEVEL_OUTOF10: 0

## 2021-07-08 NOTE — ED PROVIDER NOTES
Department of Emergency Medicine   ED  Provider Note  Admit Date/RoomTime: 7/8/2021  2:54 PM  ED Room: 5386/9655-Z          History of Present Illness:  7/8/21, Time: 4:01 PM EDT  Chief Complaint   Patient presents with    Arm Pain     sent in by jennifer for right arm abscess that has been ongoing for over a month     Wound Check                Melburn Felty is a 63 y.o. male presenting to the ED for right arm pain and redness, beginning almost 2 weeks ago. The complaint has been persistent, mild in severity, and worsened by nothing. Patient states that he is a diabetic and had his glucose monitor on his right arm. Patient stated that approximately 2 weeks ago he removed it to place in a different site, the day after he noted redness. Patient went to his PCP where he was put on antibiotics. Patient had no improvement and came to the ED where he was placed on doxycycline approximately 4 days ago. He is getting no improvement, contacted his PCP who directed him to the ED. Patient denies any fever or chills. He is describing of pain to the area of redness and swelling that radiates down to the elbow. Review of Systems:   Pertinent positives and negatives are stated within HPI, all other systems reviewed and are negative.        --------------------------------------------- PAST HISTORY ---------------------------------------------  Past Medical History:  has a past medical history of Anxiety, Appendicitis, Class 1 obesity due to excess calories in adult, Depression, Diabetes mellitus ([facility] [address] Diabetic nephropathy associated with type 2 diabetes mellitus ([facility] [address] Gastroesophageal reflux disease without esophagitis, Hepatic steatosis, Hyperlipidemia, Hypertension, Infective myositis of right upper extremity, Kidney stones, Mesenteric hematoma, Mixed hyperlipidemia, Proteinuria, Stage 3a chronic kidney disease ([facility] [address] Type 2 diabetes mellitus without complication ([facility] [address] and Vitamin D deficiency.     Past Surgical History:  has a past surgical history that includes Colonoscopy (11/04/2019) and laparoscopic appendectomy (N/A, 1/17/2020). Social History:  reports that he has never smoked. He has never used smokeless tobacco. He reports that he does not drink alcohol and does not use drugs. Family History: family history includes Arthritis in his mother; Atrial Fibrillation in his mother; Diabetes in his father; Heart Surgery in his father; High Blood Pressure in his father; Hypertension in his father; Liver Disease in his mother; Other in his mother. . Unless otherwise noted, family history is non contributory    The patients home medications have been reviewed. Allergies: Patient has no known allergies. ---------------------------------------------------PHYSICAL EXAM--------------------------------------    Constitutional/General: Alert and oriented x3  Head: Normocephalic and atraumatic  Eyes: PERRL, EOMI, sclera non icteric  Mouth: Oropharynx clear, handling secretions, no trismus, no asymmetry of the posterior oropharynx or uvular edema  Neck: Supple, full ROM, no stridor, no meningeal signs  Respiratory: Lungs clear to auscultation bilaterally, no wheezes, rales, or rhonchi. Not in respiratory distress  Cardiovascular:  Regular rate. Regular rhythm. No murmurs, no aortic murmurs, no gallops, or rubs. 2+ distal pulses. Equal extremity pulses. Chest: No chest wall tenderness  GI:  Abdomen Soft, Non tender, Non distended. No rebound, guarding, or rigidity. No pulsatile masses. Musculoskeletal: Moves all extremities x 4. Warm and well perfused, no clubbing, cyanosis, or edema. Capillary refill <3 seconds  Integument: skin warm and dry. No rashes.   Abscess to the right upper arm as below  Neurologic: GCS 15, no focal deficits, symmetric strength 5/5 in the upper and lower extremities bilaterally  Psychiatric: Normal Affect          -------------------------------------------------- RESULTS -------------------------------------------------  I have personally reviewed all laboratory and imaging results for this patient. Results are listed below.      LABS: (Lab results interpreted by me)  Results for orders placed or performed during the hospital encounter of 07/08/21   CBC Auto Differential   Result Value Ref Range    WBC 6.2 4.5 - 11.5 E9/L    RBC 4.59 3.80 - 5.80 E12/L    Hemoglobin 13.3 12.5 - 16.5 g/dL    Hematocrit 40.2 37.0 - 54.0 %    MCV 87.6 80.0 - 99.9 fL    MCH 29.0 26.0 - 35.0 pg    MCHC 33.1 32.0 - 34.5 %    RDW 13.2 11.5 - 15.0 fL    Platelets 554 341 - 627 E9/L    MPV 9.4 7.0 - 12.0 fL    Neutrophils % 70.6 43.0 - 80.0 %    Immature Granulocytes % 1.1 0.0 - 5.0 %    Lymphocytes % 16.4 (L) 20.0 - 42.0 %    Monocytes % 9.3 2.0 - 12.0 %    Eosinophils % 2.1 0.0 - 6.0 %    Basophils % 0.5 0.0 - 2.0 %    Neutrophils Absolute 4.39 1.80 - 7.30 E9/L    Immature Granulocytes # 0.07 E9/L    Lymphocytes Absolute 1.02 (L) 1.50 - 4.00 E9/L    Monocytes Absolute 0.58 0.10 - 0.95 E9/L    Eosinophils Absolute 0.13 0.05 - 0.50 E9/L    Basophils Absolute 0.03 0.00 - 0.20 G5/S   Basic Metabolic Panel w/ Reflex to MG   Result Value Ref Range    Sodium 139 132 - 146 mmol/L    Potassium reflex Magnesium 4.2 3.5 - 5.0 mmol/L    Chloride 102 98 - 107 mmol/L    CO2 25 22 - 29 mmol/L    Anion Gap 12 7 - 16 mmol/L    Glucose 72 (L) 74 - 99 mg/dL    BUN 24 (H) 6 - 20 mg/dL    CREATININE 1.1 0.7 - 1.2 mg/dL    GFR Non-African American >60 >=60 mL/min/1.73    GFR African American >60     Calcium 10.5 (H) 8.6 - 10.2 mg/dL   Lactate, Sepsis   Result Value Ref Range    Lactic Acid, Sepsis 2.3 (H) 0.5 - 1.9 mmol/L   Lactate, Sepsis   Result Value Ref Range    Lactic Acid, Sepsis 2.1 (H) 0.5 - 1.9 mmol/L   ,       RADIOLOGY:  Interpreted by Radiologist unless otherwise specified  CT HUMERUS RIGHT W CONTRAST   Final Result   Findings consistent with cellulitis involving the subcutaneous soft tissues   extending from the level of the elbow dorsally to the level of the mid   humerus. Indistinctness and irregularity of the underlying muscular planes may   represent associated myositis. No evidence of drainable abscess or underlying bony changes. ------------------------- NURSING NOTES AND VITALS REVIEWED ---------------------------   The nursing notes within the ED encounter and vital signs as below have been reviewed by myself  /80   Pulse 80   Temp 97 °F (36.1 °C) (Temporal)   Resp 15   Ht 5' 6\" (1.676 m)   Wt 210 lb (95.3 kg)   SpO2 99%   BMI 33.89 kg/m²     Oxygen Saturation Interpretation: Normal    The patients available past medical records and past encounters were reviewed.         ------------------------------ ED COURSE/MEDICAL DECISION MAKING----------------------  Medications   vancomycin 2000 mg in dextrose 5% 500 ml IVPB (has no administration in time range)   allopurinol (ZYLOPRIM) tablet 100 mg (has no administration in time range)   amLODIPine (NORVASC) tablet 10 mg (has no administration in time range)   aspirin EC tablet 81 mg (has no administration in time range)   fenofibrate (TRIGLIDE) tablet 160 mg (has no administration in time range)   tamsulosin (FLOMAX) capsule 0.4 mg (has no administration in time range)   sertraline (ZOLOFT) tablet 100 mg (has no administration in time range)   pravastatin (PRAVACHOL) tablet 40 mg (has no administration in time range)   potassium citrate (UROCIT-K) extended release tablet 10 mEq (has no administration in time range)   metoprolol succinate (TOPROL XL) extended release tablet 50 mg (has no administration in time range)   losartan (COZAAR) tablet 100 mg (has no administration in time range)   glyBURIDE (DIABETA) tablet 5 mg (has no administration in time range)   0.9 % sodium chloride infusion (has no administration in time range)   glucose (GLUTOSE) 40 % oral gel 15 g (has no administration in time range)   dextrose 50 % IV solution (has no administration in time range)   glucagon (rDNA) injection 1 mg (has no administration in time range)   dextrose 5 % solution (has no administration in time range)   insulin lispro (HUMALOG) injection vial 0-12 Units (has no administration in time range)   insulin lispro (HUMALOG) injection vial 0-6 Units (has no administration in time range)   enoxaparin (LOVENOX) injection 40 mg (has no administration in time range)   morphine (PF) injection 2 mg (has no administration in time range)   HYDROcodone-acetaminophen (NORCO) 5-325 MG per tablet 1 tablet (has no administration in time range)   pantoprazole (PROTONIX) tablet 40 mg (has no administration in time range)   acetaminophen (TYLENOL) tablet 650 mg (has no administration in time range)   iopamidol (ISOVUE-370) 76 % injection 75 mL (75 mLs Intravenous Given 7/8/21 1728)   cefepime (MAXIPIME) 2000 mg IVPB minibag (2,000 mg Intravenous New Bag 7/8/21 1951)               Medical Decision Making:     I, Dr. Raven Herring, am the primary provider of record    46-year-old male with ongoing abscess to the right upper arm. Patient's PCP directed patient to ED for incision and drainage but is requesting general surgery to perform. Will obtain CT to see if there is any deeper infection as he is having pain radiating to the elbow. He is hemodynamically stable and well-appearing. No leukocytosis. CT of the right upper extremity shows cellulitis and possible myositis but no abscess. Patient will be given cefepime and vancomycin, admitted for further management. Re-Evaluations: This patient's ED course included: a personal history and physicial examination and re-evaluation prior to disposition    This patient has remained hemodynamically stable and been closely monitored during their ED course. Counseling:    The emergency provider has spoken with the patient and discussed todays results, in addition to providing specific details for the plan of care

## 2021-07-09 LAB
ALBUMIN SERPL-MCNC: 4.3 G/DL (ref 3.5–5.2)
ALP BLD-CCNC: 75 U/L (ref 40–129)
ALT SERPL-CCNC: 41 U/L (ref 0–40)
ANION GAP SERPL CALCULATED.3IONS-SCNC: 13 MMOL/L (ref 7–16)
AST SERPL-CCNC: 38 U/L (ref 0–39)
BASOPHILS ABSOLUTE: 0.04 E9/L (ref 0–0.2)
BASOPHILS RELATIVE PERCENT: 0.5 % (ref 0–2)
BILIRUB SERPL-MCNC: <0.2 MG/DL (ref 0–1.2)
BILIRUBIN DIRECT: <0.2 MG/DL (ref 0–0.3)
BILIRUBIN, INDIRECT: ABNORMAL MG/DL (ref 0–1)
BUN BLDV-MCNC: 21 MG/DL (ref 6–20)
C-REACTIVE PROTEIN: 0.3 MG/DL (ref 0–0.4)
CALCIUM IONIZED: 1.36 MMOL/L (ref 1.15–1.33)
CALCIUM SERPL-MCNC: 9.8 MG/DL (ref 8.6–10.2)
CHLORIDE BLD-SCNC: 103 MMOL/L (ref 98–107)
CHOLESTEROL, TOTAL: 148 MG/DL (ref 0–199)
CO2: 23 MMOL/L (ref 22–29)
CREAT SERPL-MCNC: 1.3 MG/DL (ref 0.7–1.2)
EOSINOPHILS ABSOLUTE: 0.18 E9/L (ref 0.05–0.5)
EOSINOPHILS RELATIVE PERCENT: 2.5 % (ref 0–6)
FOLATE: 10.5 NG/ML (ref 4.8–24.2)
GFR AFRICAN AMERICAN: >60
GFR NON-AFRICAN AMERICAN: 56 ML/MIN/1.73
GLUCOSE BLD-MCNC: 143 MG/DL (ref 74–99)
HBA1C MFR BLD: 6.7 % (ref 4–5.6)
HCT VFR BLD CALC: 39.9 % (ref 37–54)
HDLC SERPL-MCNC: 40 MG/DL
HEMOGLOBIN: 13.2 G/DL (ref 12.5–16.5)
IMMATURE GRANULOCYTES #: 0.07 E9/L
IMMATURE GRANULOCYTES %: 1 % (ref 0–5)
LACTIC ACID, SEPSIS: 1.6 MMOL/L (ref 0.5–1.9)
LDL CHOLESTEROL CALCULATED: ABNORMAL MG/DL (ref 0–99)
LV EF: 64 %
LVEF MODALITY: NORMAL
LYMPHOCYTES ABSOLUTE: 1.3 E9/L (ref 1.5–4)
LYMPHOCYTES RELATIVE PERCENT: 17.9 % (ref 20–42)
MAGNESIUM: 1.9 MG/DL (ref 1.6–2.6)
MCH RBC QN AUTO: 29.4 PG (ref 26–35)
MCHC RBC AUTO-ENTMCNC: 33.1 % (ref 32–34.5)
MCV RBC AUTO: 88.9 FL (ref 80–99.9)
METER GLUCOSE: 126 MG/DL (ref 74–99)
METER GLUCOSE: 162 MG/DL (ref 74–99)
METER GLUCOSE: 196 MG/DL (ref 74–99)
METER GLUCOSE: 209 MG/DL (ref 74–99)
MONOCYTES ABSOLUTE: 0.63 E9/L (ref 0.1–0.95)
MONOCYTES RELATIVE PERCENT: 8.7 % (ref 2–12)
NEUTROPHILS ABSOLUTE: 5.06 E9/L (ref 1.8–7.3)
NEUTROPHILS RELATIVE PERCENT: 69.4 % (ref 43–80)
PDW BLD-RTO: 13.1 FL (ref 11.5–15)
PHOSPHORUS: 3.3 MG/DL (ref 2.5–4.5)
PLATELET # BLD: 184 E9/L (ref 130–450)
PMV BLD AUTO: 9.5 FL (ref 7–12)
POTASSIUM SERPL-SCNC: 3.9 MMOL/L (ref 3.5–5)
PRO-BNP: 28 PG/ML (ref 0–125)
PROCALCITONIN: 0.09 NG/ML (ref 0–0.08)
RBC # BLD: 4.49 E12/L (ref 3.8–5.8)
SEDIMENTATION RATE, ERYTHROCYTE: 28 MM/HR (ref 0–15)
SODIUM BLD-SCNC: 139 MMOL/L (ref 132–146)
T4 FREE: 1.16 NG/DL (ref 0.93–1.7)
TOTAL CK: 144 U/L (ref 20–200)
TOTAL PROTEIN: 7.1 G/DL (ref 6.4–8.3)
TRIGL SERPL-MCNC: 599 MG/DL (ref 0–149)
TSH SERPL DL<=0.05 MIU/L-ACNC: 1.37 UIU/ML (ref 0.27–4.2)
URIC ACID, SERUM: 5.5 MG/DL (ref 3.4–7)
VITAMIN B-12: 480 PG/ML (ref 211–946)
VLDLC SERPL CALC-MCNC: ABNORMAL MG/DL
WBC # BLD: 7.3 E9/L (ref 4.5–11.5)

## 2021-07-09 PROCEDURE — 84439 ASSAY OF FREE THYROXINE: CPT

## 2021-07-09 PROCEDURE — 84100 ASSAY OF PHOSPHORUS: CPT

## 2021-07-09 PROCEDURE — 96372 THER/PROPH/DIAG INJ SC/IM: CPT

## 2021-07-09 PROCEDURE — 83036 HEMOGLOBIN GLYCOSYLATED A1C: CPT

## 2021-07-09 PROCEDURE — 82746 ASSAY OF FOLIC ACID SERUM: CPT

## 2021-07-09 PROCEDURE — 6360000002 HC RX W HCPCS: Performed by: SPECIALIST

## 2021-07-09 PROCEDURE — 6360000002 HC RX W HCPCS: Performed by: INTERNAL MEDICINE

## 2021-07-09 PROCEDURE — 82550 ASSAY OF CK (CPK): CPT

## 2021-07-09 PROCEDURE — 80048 BASIC METABOLIC PNL TOTAL CA: CPT

## 2021-07-09 PROCEDURE — 83605 ASSAY OF LACTIC ACID: CPT

## 2021-07-09 PROCEDURE — G0378 HOSPITAL OBSERVATION PER HR: HCPCS

## 2021-07-09 PROCEDURE — 2580000003 HC RX 258: Performed by: INTERNAL MEDICINE

## 2021-07-09 PROCEDURE — 93306 TTE W/DOPPLER COMPLETE: CPT

## 2021-07-09 PROCEDURE — 80076 HEPATIC FUNCTION PANEL: CPT

## 2021-07-09 PROCEDURE — 82607 VITAMIN B-12: CPT

## 2021-07-09 PROCEDURE — 85025 COMPLETE CBC W/AUTO DIFF WBC: CPT

## 2021-07-09 PROCEDURE — 83735 ASSAY OF MAGNESIUM: CPT

## 2021-07-09 PROCEDURE — 85651 RBC SED RATE NONAUTOMATED: CPT

## 2021-07-09 PROCEDURE — 82330 ASSAY OF CALCIUM: CPT

## 2021-07-09 PROCEDURE — 2580000003 HC RX 258: Performed by: SPECIALIST

## 2021-07-09 PROCEDURE — 84443 ASSAY THYROID STIM HORMONE: CPT

## 2021-07-09 PROCEDURE — 84145 PROCALCITONIN (PCT): CPT

## 2021-07-09 PROCEDURE — 96376 TX/PRO/DX INJ SAME DRUG ADON: CPT

## 2021-07-09 PROCEDURE — 36415 COLL VENOUS BLD VENIPUNCTURE: CPT

## 2021-07-09 PROCEDURE — 6370000000 HC RX 637 (ALT 250 FOR IP): Performed by: INTERNAL MEDICINE

## 2021-07-09 PROCEDURE — 80061 LIPID PANEL: CPT

## 2021-07-09 PROCEDURE — 84550 ASSAY OF BLOOD/URIC ACID: CPT

## 2021-07-09 PROCEDURE — 96367 TX/PROPH/DG ADDL SEQ IV INF: CPT

## 2021-07-09 PROCEDURE — 1200000000 HC SEMI PRIVATE

## 2021-07-09 PROCEDURE — 96375 TX/PRO/DX INJ NEW DRUG ADDON: CPT

## 2021-07-09 PROCEDURE — 83880 ASSAY OF NATRIURETIC PEPTIDE: CPT

## 2021-07-09 PROCEDURE — 82962 GLUCOSE BLOOD TEST: CPT

## 2021-07-09 PROCEDURE — 99253 IP/OBS CNSLTJ NEW/EST LOW 45: CPT | Performed by: SURGERY

## 2021-07-09 PROCEDURE — 86140 C-REACTIVE PROTEIN: CPT

## 2021-07-09 RX ADMIN — Medication 2000 MG: at 19:37

## 2021-07-09 RX ADMIN — INSULIN LISPRO 2 UNITS: 100 INJECTION, SOLUTION INTRAVENOUS; SUBCUTANEOUS at 21:13

## 2021-07-09 RX ADMIN — METOPROLOL SUCCINATE 50 MG: 50 TABLET, EXTENDED RELEASE ORAL at 17:20

## 2021-07-09 RX ADMIN — FENOFIBRATE 160 MG: 160 TABLET ORAL at 08:57

## 2021-07-09 RX ADMIN — POTASSIUM CITRATE 10 MEQ: 5 TABLET ORAL at 08:57

## 2021-07-09 RX ADMIN — TAMSULOSIN HYDROCHLORIDE 0.4 MG: 0.4 CAPSULE ORAL at 08:57

## 2021-07-09 RX ADMIN — SODIUM CHLORIDE: 9 INJECTION, SOLUTION INTRAVENOUS at 09:06

## 2021-07-09 RX ADMIN — ENOXAPARIN SODIUM 40 MG: 40 INJECTION SUBCUTANEOUS at 08:58

## 2021-07-09 RX ADMIN — GLYBURIDE 5 MG: 5 TABLET ORAL at 17:22

## 2021-07-09 RX ADMIN — INSULIN LISPRO 2 UNITS: 100 INJECTION, SOLUTION INTRAVENOUS; SUBCUTANEOUS at 11:37

## 2021-07-09 RX ADMIN — POTASSIUM CITRATE 10 MEQ: 5 TABLET ORAL at 17:21

## 2021-07-09 RX ADMIN — ASPIRIN 81 MG: 81 TABLET, COATED ORAL at 08:57

## 2021-07-09 RX ADMIN — ACETAMINOPHEN 650 MG: 325 TABLET ORAL at 15:32

## 2021-07-09 RX ADMIN — PANTOPRAZOLE SODIUM 40 MG: 40 TABLET, DELAYED RELEASE ORAL at 06:25

## 2021-07-09 RX ADMIN — SERTRALINE 100 MG: 100 TABLET, FILM COATED ORAL at 08:57

## 2021-07-09 RX ADMIN — SODIUM CHLORIDE: 9 INJECTION, SOLUTION INTRAVENOUS at 11:08

## 2021-07-09 RX ADMIN — Medication 2000 MG: at 14:24

## 2021-07-09 RX ADMIN — SODIUM CHLORIDE: 9 INJECTION, SOLUTION INTRAVENOUS at 19:50

## 2021-07-09 RX ADMIN — GLYBURIDE 5 MG: 5 TABLET ORAL at 08:57

## 2021-07-09 RX ADMIN — DAPTOMYCIN 450 MG: 500 INJECTION, POWDER, LYOPHILIZED, FOR SOLUTION INTRAVENOUS at 11:36

## 2021-07-09 RX ADMIN — ALLOPURINOL 100 MG: 100 TABLET ORAL at 08:57

## 2021-07-09 RX ADMIN — LOSARTAN POTASSIUM 100 MG: 50 TABLET, FILM COATED ORAL at 08:57

## 2021-07-09 RX ADMIN — AMLODIPINE BESYLATE 10 MG: 10 TABLET ORAL at 21:12

## 2021-07-09 RX ADMIN — METOPROLOL SUCCINATE 50 MG: 50 TABLET, EXTENDED RELEASE ORAL at 08:57

## 2021-07-09 RX ADMIN — PRAVASTATIN SODIUM 40 MG: 20 TABLET ORAL at 21:12

## 2021-07-09 ASSESSMENT — PAIN SCALES - GENERAL
PAINLEVEL_OUTOF10: 0
PAINLEVEL_OUTOF10: 5
PAINLEVEL_OUTOF10: 0

## 2021-07-09 NOTE — PROGRESS NOTES
Physical Therapy  Pt orders received. Pt reports he does not need PT /OT services, is independent with mobility. Will discharge order.

## 2021-07-09 NOTE — PROGRESS NOTES
"    Workforce Health  Daily Note/Discharge    Visit Count: 21  Plan of Care Dates: Initial: 11-18-19 Through: 12-31-19  Referred by: Dr. Heidy Curtis MD  Diagnosis:    840.4 (ICD-9-CM) - O15.273 (ICD-10-CM) - Incomplete tear of left rotator cuff Â     Â  716.91 (ICD-9-CM) - M19.012 (ICD-10-CM) - Arthritis of left shoulder region Â    Â  V45.89 (ICD-9-CM) - Z98.890 (ICD-10-CM) - Post-operative state        Next Referring Provider Visit: 1-30-20    Insurance: Jessika Hartmann Number: [id number]  Claim Status: claim open and in good standing  Current Work Status: part time occupation: Crew  Adjustor/: Bijan Oakes  Phone number: [phone]    Date of Injury: 1/13/19  Surgery: date of surgery: 8/8/19; surgery performed: Left shoulder arthroscopic subacromial decompression, left shoulder arthroscopic distal clavicle excision, left shoulder arthroscopic rotator cuff repair   Precautions: None    Work Status:  Job Title: Crew  Current Employer: [employer]  : Ol[employer] Martinez ([phone])  Last Date Worked: Working light duty as of 9/11/19  Restrictions: No restrictions on right and left 10#  Return to Work Date: 9/11/19  Job Description Obtained: no  Job Site Visit: No    Case Notes/Attendance:  Date of Communication: 12/16/19; Results: provided status update, and got okay to continue through 12/31/19  Attendance Concerns: none at this time    SUBJECTIVE   Pain on arrival: 0/10    ""I just feel fatigue in my muscles. I did not have any increase of pain with the upgrades yesterday. \""    OBJECTIVE   Functional Status:  Functional Activity Patient Â Abilities as of  Date: 12/30/19 Job Demands  [x]? ??Â Employer [x]? ??Â Â Client Report Met/Not met Job Demands   Lift floor to waist 50# 50#  Met   Lift waist to chest 30# 20#-50# Met   Lift waist to overhead 20# 20#  Met   One handed carry  Left 25# Right 25# 25# Met   Two Hand Carry 50# 50# (ice and ice cream bags)  Met   Push/Pull Â 20# Cart with food and supplies-20# Met " General Surgery consult secure message to Dr. Kailyn Gonzalez. "  Crouching Occ Occ Met   Standing Occ Occ Met   Walking Constant Constant Met   Reaching shoulder and overhead Occ Occ  Met   Sweeping/mopping (in figure 8 pattern) 5 min Occ (2 1/2 hours)  Met   Areas blank above not yet assessed due to not medically appropriate. Â Will be assessed when appropriate. Definitions: Never (N); Occasionally (O) = up to 1/3 of the day; Frequently (F) = 1/3 to 2/3 of the day; Constantly (C) = 2/3 to the full day    Treatment   Completed task sheet. Refer to task sheets for specifics. Provided assistance to ensure weight in crates was accurate. Provided instruction to decrease pace of elbow extension. ASSESSMENT   Pt able to perform lifting/carrying circuit at job demand level today without increase in pain    Pain after treatment: 0/10 Left, Right 0/10 left  ""sore and tired in post left shoulder, but not to a number level\""    Result of above outlined education: Verbalizes understanding, Demonstrates understanding and Needs reinforcement    Goals: To be obtained by end of this plan of care:   1. Client will demonstrate ability to meet all job demands as listed above for return to work regular duty/ restricted duty. 2. Client will demonstrate safe and consistent use of proper posture and body mechanics with all tasks to facilitate safe return to work. 3. Client to verbalize decreased return to work concerns. 4. Client and/or employer to confirm accuracy of a job description and/or participate in a job site visit to confirm job demands to enable smooth transition to restricted duty or full duty work as appropriate. 5. Client will demonstrate independence with progressive home.       GOALS MET    PLAN   Continue to progress UE strength and lifting tolerance in work program.         THERAPY DAILY BILLING   Evaluation Procedures:  No evaluation codes were used on this date of service    Timed Procedures:  Work hardening/conditioning initial 2 hours: 1 unit(s), 90 " minutes    Untimed Procedures:  No untimed codes were used on this date of service    Total Treatment Time: 100 minutes    Workforce Health Database:      Â· Referring Therapist (from acute OP to work program): Kendall Ny  Â· Referring Site: Arti Sullivan Date of Injury: 1/13/2019    Â· Last Date Worked (if working, last date worked should be admit date to program): 11/18/2019  Â· Return to Work Date (During program or after discharge if known): 11/8/2019      Â· Program Start Date: 11/18/2019    Â· Percentage of job demands met at admission (use job demand grid to determine): 44%  Â· Work Level Upon Admission: 3. Employed/employable with restrictions: same employer with restrictions    Â· Total Number of Visits: 21   Â· Medical Diagnosis (if more than one referred diagnosis, use referred diagnosis that is most limiting): upper quadrant   Â· Surgical, Date 08/08/19    Â· Job Title:      Â· Vocational Rehab: 0  Â· Psychology: 0  Â· Referral to an Outside Service While in or at Discharge from the Program? No   Â· Work Level upon Discharge (what program recommends): 4. Employed/employable with no restrictions: new employer/seeking employer in same field with no restrictions  Â· Percentage of work demands met at discharge: 100    (use job demands grid to determine. Total number of job demands considered must be equal to   admission number.  If client able to perform additional job tasks at discharge, must add those additional    demands to both admission and discharge overall total. For each task is meet/does not meet to    determine percentage matching)

## 2021-07-09 NOTE — CARE COORDINATION
Introduced my self and provided explanation of CM role to patient. Patient is awake, alert, and aware of current diagnosis and treatment plan including ID consult, IV atb, gen surg and plastics consult. Patient states he is independent and denies need for any assistance related to mobility and or adl completion. Explained to patient that cm/sw will follow for potential IV atb's post discharge. He acknowledges issue and indicates preference for [facility] infusion center should IV atb's be prescribed daily. I provided patient with [address] provider list to review should multi dose per day regimen be ordered. He requests a revisit later in day for selection. He voices that should he be discharged on po atb therapy, he would have no discharge needs. Will follow along with  and assist with discharge planning as necessary. Patient vocalizes his strong desire for discharge soon. Informed him that hospital stay duration will be determined by medical team and that discharge is not anticipated to be today. Kristie Early, MSN, RN  [facility] Case Management  [phone]

## 2021-07-09 NOTE — H&P
History and Physical      CHIEF COMPLAINT: Cellulitis right upper extremity    History of Present Illness: Patient is a 65-year-old male of Dr. Narcisa Son I am asked to admit and follow. History obtained from patient as well as electronic record. I spoke with Dr. Larry Wyatt this morning for further history. Patient is a non-insulin-dependent diabetic; he requested and applied freestyle bon glucose monitor to the right arm. He informs me after freestyle Skyler Hawks was applied, he was doing extremely vigorous hard work including heavy lifting which may have irritated his arm at the site. He was seen in the office 6/28/2021 found to have right arm swollen hot red body aches and chills. He was placed on cephalexin 500 mg 3 times daily. He was advised to remove the freestyle bon patch. On 6/29 symptoms \"worsened\" he presented to the ED. In the ED he was presumed to have an abscess with surrounding cellulitis no evidence of compartment syndrome. Ultrasound confirmed abscess which was then incised and drained with purulent drainage expressed. Cephalexin increased to 4 times daily doxycycline added. He was seen again at Dr. Dwain Dubon office on 7/7/2021 with the above history; complained of area still being red swollen and tender. He was advised to report to the ED for incision and drainage. He stated he could not go that day would wait until the following day. Surgeon was called regarding same. He presented to the ED 7/8/2021 was examined then CT of the right arm was performed the following results--       FINDINGS:   There is increased attenuation and stranding of the subcutaneous fat   extending from the level of the elbow dorsally to the level of the mid   humerus, consistent with cellulitis. There is indistinctness and contour irregularity of the underlying muscular   planes, which may represent myositis.      There are no organized circumscribed peripherally enhancing fluid collections   to suggest drainable abscess. There are no underlying erosive or lytic bony changes. There is no evidence of soft tissue gas.       Impression:       Findings consistent with cellulitis involving the subcutaneous soft tissues   extending from the level of the elbow dorsally to the level of the mid   humerus. Indistinctness and irregularity of the underlying muscular planes may   represent associated myositis. I spoke with the ED physician; there was no evidence of true abscess; general surgery was consulted; ED also suggested orthopedic and/or plastic surgery for evaluation for myositis. He has been afebrile since admission. ED labs revealed WBC of 6.2, today 7.3. A1c 6.7. CRP normal at 0.3, ESR slightly elevated 28. Blood cultures have been done and are pending. This morning he was seen by general surgical service; they did not believe myositis was present on the CT; plastic surgeon was leaving town today for vacation; I suggested a cancellous consult for plastic surgery. ID was also consulted they have seen patient this a.m. They believe patient has cellulitis with phlegmon in deltoid area, doubt myositis without fever leukocytosis and procalcitonin of only 0.09. Recommend continuing daptomycin and cefazolin, obtain CK await culture results. SPECT in a day or so with IV antibiotics, abscess will develop which needs to be surgically drained. --Patient started on cefepime and vancomycin in the ED which has now been discontinued. --2020 patient had Holter monitor applied due to complaints of palpitations. Minimal heart rate 63 maximum heart rate 118; 1068 PVCs 2 beats of supraventricular origin. He was placed on the monitor and observation yesterday for further clarification  --1/17/2020 patient underwent surgery for appendectomy found to have a mesenteric hematoma; vascular surgery was consulted no mesenteric surgery needed.   --Past medical history is negative for rheumatic fever scarlet fever polio diphtheria cancer.   Diabetes present since approximately 2005; hypertension 2005  --Lifelong non-smoker  --Mother and father both alive with diabetes and hypertension  --Patient has a history of heart murmur in the past; 2D echo has been ordered        Past Medical History:   Diagnosis Date    Anxiety     Appendicitis 01/17/2020    Class 1 obesity due to excess calories in adult 12/03/2020    Depression     Diabetes mellitus ([facility] 2005    Diabetic nephropathy associated with type 2 diabetes mellitus ([address] 12/03/2020    Gastroesophageal reflux disease without esophagitis 12/03/2020    Hepatic steatosis 12/03/2020    Hyperlipidemia     Hypertension 2005    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 Sherine Washington Tubular adenoma; diverticulosis    LAPAROSCOPIC APPENDECTOMY N/A 1/17/2020    APPENDECTOMY LAPAROSCOPIC, EXPLORATORY LAPAROTOMY performed by Shobha Carroll MD at [facility] OR       Medications Prior to Admission:    Medications Prior to Admission: doxycycline hyclate (VIBRA-TABS) 100 MG tablet, Take 1 tablet by mouth 2 times daily for 7 days  tamsulosin (FLOMAX) 0.4 MG capsule, Take 0.4 mg by mouth daily  amLODIPine (NORVASC) 10 MG tablet, Take 10 mg by mouth daily  empagliflozin (JARDIANCE) 25 MG tablet, Take 25 mg by mouth daily  Continuous Blood Gluc Sensor (FREESTYLE ROCIO 2 SENSOR) [facility], To change every 14 days  metFORMIN (GLUCOPHAGE) 1000 MG tablet, Take 1,000 mg by mouth 2 times daily (with meals)  potassium citrate (UROCIT-K) 10 MEQ (1080 MG) extended release tablet, Take 10 mEq by mouth 2 times daily (with meals)  allopurinol (ZYLOPRIM) 100 MG tablet, Take 100 mg by mouth daily  losartan (COZAAR) 100 MG tablet, Take 1 tablet by mouth daily  sertraline (ZOLOFT) 100 MG tablet, Take 1 tablet by mouth daily  glyBURIDE (DIABETA) 5 MG tablet, Take 1 tablet by mouth 2 times daily  fenofibrate micronized (LOFIBRA) 134 MG capsule, Take 1 capsule by mouth every morning (before breakfast)  metoprolol succinate (TOPROL XL) 50 MG extended release tablet, Take 1 tablet by mouth 2 times daily  pravastatin (PRAVACHOL) 40 MG tablet, Take 1 tablet by mouth daily  aspirin 81 MG tablet, Take 81 mg by mouth daily    Allergies:    Patient has no known allergies. Social History:    reports that he has never smoked. He has never used smokeless tobacco. He reports that he does not drink alcohol and does not use drugs. Family History:   family history includes Arthritis in his mother; Atrial Fibrillation in his mother; Diabetes in his father; Heart Surgery in his father; High Blood Pressure in his father; Hypertension in his father; Liver Disease in his mother; Other in his mother. REVIEW OF SYSTEMS:  As above in the HPI, otherwise negative    PHYSICAL EXAM:    VS: /80   Pulse 81   Temp 97.9 °F (36.6 °C) (Oral)   Resp 20   Ht 5' 6\" (1.676 m)   Wt 202 lb 11.2 oz (91.9 kg)   SpO2 97%   BMI 32.72 kg/m²     General appearance: Alert, Awake, Oriented times 3, no distress  Skin: Warm and dry ; no rashes  Head: Normocephalic. No masses, lesions or tenderness noted  Eyes: Conjunctivae pink, sclera white. PERRL,EOM-I  Ears: External ears normal  Nose/Sinuses: Nares normal. Septum midline. Mucosa normal. No drainage  Oropharynx: Oropharynx clear with no exudate seen  Neck: Supple. No jugular venous distension, lymphadenopathy or thyromegaly Trachea midline  Lungs: Clear to auscultation bilaterally. No rhonchi, crackles or wheezes  Heart: S1 S2  Regular rate and rhythm. No rub, murmur or gallop  Abdomen: Soft, non-tender.  BS normal. No masses, organomegaly; no rebound or guarding  Extremities: No lower extremity edema, Peripheral pulses palpable; induration mid triceps area painful to percussion. Only other pain is if he puts his right hand at the back of his head, stretching triceps  Musculoskeletal: Muscular strength appears intact. Neuro:  No focal motor defects ; II-XII grossly intact .  MAHAN equally  Breast: deferred  Rectal: deferred  Genitalia:  deferred    LABS:  CBC:   Lab Results   Component Value Date    WBC 7.3 07/09/2021    RBC 4.49 07/09/2021    HGB 13.2 07/09/2021    HCT 39.9 07/09/2021    MCV 88.9 07/09/2021    MCH 29.4 07/09/2021    MCHC 33.1 07/09/2021    RDW 13.1 07/09/2021     07/09/2021    MPV 9.5 07/09/2021     CBC with Differential:    Lab Results   Component Value Date    WBC 7.3 07/09/2021    RBC 4.49 07/09/2021    HGB 13.2 07/09/2021    HCT 39.9 07/09/2021     07/09/2021    MCV 88.9 07/09/2021    MCH 29.4 07/09/2021    MCHC 33.1 07/09/2021    RDW 13.1 07/09/2021    LYMPHOPCT 17.9 07/09/2021    MONOPCT 8.7 07/09/2021    BASOPCT 0.5 07/09/2021    MONOSABS 0.63 07/09/2021    LYMPHSABS 1.30 07/09/2021    EOSABS 0.18 07/09/2021    BASOSABS 0.04 07/09/2021     Hemoglobin/Hematocrit:    Lab Results   Component Value Date    HGB 13.2 07/09/2021    HCT 39.9 07/09/2021     CMP:    Lab Results   Component Value Date     07/09/2021    K 3.9 07/09/2021    K 4.2 07/08/2021     07/09/2021    CO2 23 07/09/2021    BUN 21 07/09/2021    CREATININE 1.3 07/09/2021    GFRAA >60 07/09/2021    LABGLOM 56 07/09/2021    GLUCOSE 143 07/09/2021    PROT 7.1 07/09/2021    LABALBU 4.3 07/09/2021    CALCIUM 9.8 07/09/2021    BILITOT <0.2 07/09/2021    ALKPHOS 75 07/09/2021    AST 38 07/09/2021    ALT 41 07/09/2021     BMP:    Lab Results   Component Value Date     07/09/2021    K 3.9 07/09/2021    K 4.2 07/08/2021     07/09/2021    CO2 23 07/09/2021    BUN 21 07/09/2021    LABALBU 4.3 07/09/2021    CREATININE 1.3 07/09/2021    CALCIUM 9.8 07/09/2021    GFRAA >60 07/09/2021    LABGLOM 56 07/09/2021    GLUCOSE 143 07/09/2021     Hepatic Function Panel:    Lab Results   Component Value Date    ALKPHOS 75 07/09/2021    ALT 41 07/09/2021    AST 38 07/09/2021    PROT 7.1 07/09/2021    BILITOT <0.2 07/09/2021    BILIDIR <0.2 07/09/2021    IBILI see below 07/09/2021    LABALBU 4.3 07/09/2021     Albumin:    Lab Results   Component Value Date    LABALBU 4.3 07/09/2021     Calcium:    Lab Results   Component Value Date    CALCIUM 9.8 07/09/2021     Ionized Calcium:  No results found for: IONCA  Magnesium:    Lab Results   Component Value Date    MG 1.9 07/09/2021     Phosphorus:    Lab Results   Component Value Date    PHOS 3.3 07/09/2021     Uric Acid:    Lab Results   Component Value Date    LABURIC 5.5 07/09/2021     Troponin:  No results found for: TROPONINI  U/A:    Lab Results   Component Value Date    COLORU Yellow 11/09/2020    PROTEINU >=300 11/09/2020    PHUR 6.0 11/09/2020    WBCUA NONE 11/09/2020    RBCUA 0-1 11/09/2020    BACTERIA NONE SEEN 11/09/2020    CLARITYU Clear 11/09/2020    SPECGRAV >=1.030 11/09/2020    LEUKOCYTESUR Negative 11/09/2020    UROBILINOGEN 0.2 11/09/2020    BILIRUBINUR Negative 11/09/2020    BLOODU TRACE-INTACT 11/09/2020    GLUCOSEU 500 11/09/2020     HgBA1c:    Lab Results   Component Value Date    LABA1C 6.7 07/09/2021     FLP:    Lab Results   Component Value Date    TRIG 599 07/09/2021    HDL 40 07/09/2021    [address] - 07/09/2021    LABVLDL - 07/09/2021     TSH:    Lab Results   Component Value Date    TSH 1.370 07/09/2021     VITAMIN B12: No components found for: B12  FOLATE:    Lab Results   Component Value Date    FOLATE 9.4 01/23/2020     IRON:  No results found for: IRON  Iron Saturation:  No components found for: PERCENTFE  TIBC:  No results found for: TIBC  FERRITIN:  No results found for: FERRITIN    RADIOLOGY:  CT HUMERUS RIGHT W CONTRAST   Final Result   Findings consistent with cellulitis involving the

## 2021-07-09 NOTE — CARE COORDINATION
Follow up with patient at bedside. He voices he is not prepared to make a [place] [address] selection and desires to reach out to others who can direct him. I informed him that a delay in [place] [address] agency selection may delay his ability to receive care in the home and as such delay his discharge. He verbalized understanding but provided no C selection at this time. List of providers remains with patient. Rajesh Sanchez.  Sharath, MSN, RN  [facility] Case Management  [phone]

## 2021-07-09 NOTE — CONSULTS
[address] [place] [address] [facility]    Consultation Note     Admit Date: 7/8/2021  2:54 PM    Reason for Consult: Myositis    Attending Physician:  Kory Morales DO     Chief Complaint: Right arm redness and pain for the last 2 weeks    HISTORY OF PRESENT ILLNESS:   The patient is a 63 y.o.  male not known to the Infectious Diseases service. The patient is admitted through the emergency room with right arm pain and cellulitis associated with use for diabetic monitoring. Patient says side thighs been to his primary care in the ED with no results with oral antimicrobials and both evaluations. General surgery has declared this to be a cellulitis and the ED started Vanco and cefepime. Patient's been afebrile and his white count is normal.  White count is 6.2 and hemoglobin is 13.3. Sed rate was 28. Infectious disease asked to evaluate after CT scan demonstrated subcutaneous soft tissue cellulitis and myositis. Patient's BUN and creatinine is 24 1.1 on admission C-reactive protein is 0.3. The patient states that his arm was cellulitic from his wrist to his biceps and now on cephalexin and doxycycline it is localized to a lesion in the biceps region.   He has pain when he lifts his arm otherwise no dysfunction  Past Medical History:        Diagnosis Date    Anxiety     Appendicitis 01/17/2020    Class 1 obesity due to excess calories in adult 12/03/2020    Depression     Diabetes mellitus ([address] 2005    Diabetic nephropathy associated with type 2 diabetes mellitus ([address] 12/03/2020    Gastroesophageal reflux disease without esophagitis 12/03/2020    Hepatic steatosis 12/03/2020    Hyperlipidemia     Hypertension 2005    Infective myositis of right upper extremity 07/08/2021    Kidney stones     X2    Mesenteric hematoma 01/17/2020    Mixed hyperlipidemia 05/13/2021    Proteinuria     Stage 3a chronic kidney disease ([address] 12/03/2020    Type 2 diabetes mellitus without complication [facility]     Vitamin D deficiency 05/18/2021     Past Surgical History:        Procedure Laterality Date    COLONOSCOPY  11/04/2019    Dr. Michelle Holley Tubular adenoma; diverticulosis    LAPAROSCOPIC APPENDECTOMY N/A 1/17/2020    APPENDECTOMY LAPAROSCOPIC, EXPLORATORY LAPAROTOMY performed by Aníbal Yadav MD at [facility] OR     Current Medications:   Scheduled Meds:   allopurinol  100 mg Oral Daily    amLODIPine  10 mg Oral Daily    aspirin  81 mg Oral Daily    fenofibrate  160 mg Oral Daily    tamsulosin  0.4 mg Oral Daily    sertraline  100 mg Oral Daily    potassium citrate  10 mEq Oral BID WC    metoprolol succinate  50 mg Oral BID    losartan  100 mg Oral Daily    glyBURIDE  5 mg Oral BID    insulin lispro  0-12 Units Subcutaneous TID WC    insulin lispro  0-6 Units Subcutaneous Nightly    enoxaparin  40 mg Subcutaneous Daily    pantoprazole  40 mg Oral QAM AC    pravastatin  40 mg Oral Nightly     Continuous Infusions:   sodium chloride 100 mL/hr at 07/09/21 0906    dextrose       PRN Meds:glucose, dextrose, glucagon (rDNA), dextrose, morphine, HYDROcodone 5 mg - acetaminophen, acetaminophen    Allergies:  Patient has no known allergies.     Social History:   Social History     Socioeconomic History    Marital status:      Spouse name: None    Number of children: None    Years of education: None    Highest education level: None   Occupational History    None   Tobacco Use    Smoking status: Never Smoker    Smokeless tobacco: Never Used   Vaping Use    Vaping Use: Never used   Substance and Sexual Activity    Alcohol use: Never    Drug use: Never    Sexual activity: None   Other Topics Concern    None   Social History Narrative    None     Social Determinants of Health     Financial Resource Strain: Low Risk     Difficulty of Paying Living Expenses: Not hard at all   Food Insecurity: No Food Insecurity    Worried About Running Out of Food in the Last Year: Never true  Ran Out of Food in the Last Year: Never true   Transportation Needs:     Lack of Transportation (Medical):  Lack of Transportation (Non-Medical):    Physical Activity:     Days of Exercise per Week:     Minutes of Exercise per Session:    Stress:     Feeling of Stress :    Social Connections:     Frequency of Communication with Friends and Family:     Frequency of Social Gatherings with Friends and Family:     Attends Latter day Services:     Active Member of Clubs or Organizations:     Attends Club or Organization Meetings:     Marital Status:    Intimate Partner Violence:     Fear of Current or Ex-Partner:     Emotionally Abused:     Physically Abused:     Sexually Abused: Tobacco: No  Alcohol: No  Pets: No  Travel: No    Family History:       Problem Relation Age of Onset    Other Mother         Alive age 80 26; minimal arthritis    Arthritis Mother     Atrial Fibrillation Mother     Liver Disease Mother     Diabetes Father         Alive age 80 26    Hypertension Father     High Blood Pressure Father     Heart Surgery Father    . Otherwise non-pertinent to the chief complaint. REVIEW OF SYSTEMS:    CONSTITUTIONAL:  No chills, fevers or night sweats. No loss of weight. EYES:  No double vision or drainage from eyes, ears or throat. HEENT:  No neck stiffness. No dysphagia. No drainage from eyes, ears or throat  RESPIRATORY:  No cough, productive sputum or hemoptysis. CARDIOVASCULAR:  No chest pain, palpitations, orthopnea or dyspnea on exertion. GASTROINTESTINAL:  No nausea, vomiting, diarrhea or constipation or hematochezia   GENITOURINARY:  No frequency burning dysuria or hematuria. INTEGUMENT/BREAST:  No rash or breast masses. HEMATOLOGIC/LYMPHATIC:  No lymphadenopathy or blood dyscrasics. ALLERGIC/IMMUNOLOGIC:  No anaphylaxis. ENDOCRINE:  No polyuria or polydipsia or temperature intolerance.     MUSCULOSKELETAL: See history of present illness  NEUROLOGICAL:  No focal motor sensory deficit. BEHAVIOR/PSYCH:  No psychosis. PHYSICAL EXAM:    Vitals:    /80   Pulse 81   Temp 97.9 °F (36.6 °C) (Oral)   Resp 20   Ht 5' 6\" (1.676 m)   Wt 202 lb 11.2 oz (91.9 kg)   SpO2 97%   BMI 32.72 kg/m²   Constitutional: The patient is awake, alert, and oriented. Skin: Warm and dry. No rashes were noted. No jaundice. HEENT: Eyes show round, and reactive pupils. Moist mucous membranes, no ulcerations, no thrush. Neck: Supple to movements. No lymphadenopathy. Chest: No use of accessory muscles to breathe. Symmetrical expansion. Auscultation reveals no wheezing, crackles, or rhonchi. Cardiovascular: S1 and S2 are rhythmic and regular. No murmurs appreciated. Abdomen: Positive bowel sounds to auscultation. Benign to palpation. No masses felt. No hepatosplenomegaly. Genitourinary: Male  Extremities: No clubbing, no cyanosis, no edema. Musculoskeletal: Right deltoid area is indurated with the beginnings of a draining purulence  Neurological: No focal  Lines: peripheral      CBC+dif:  Recent Labs     07/08/21  1619 07/08/21  1619 07/09/21  0450   WBC 6.2  --  7.3   HGB 13.3   < > 13.2   HCT 40.2   < > 39.9   MCV 87.6   < > 88.9      < > 184   NEUTROABS 4.39   < > 5.06    < > = values in this interval not displayed.      Lab Results   Component Value Date    CRP 0.3 07/09/2021     No results found for: CRP  Lab Results   Component Value Date    SEDRATE 28 (H) 07/09/2021     Lab Results   Component Value Date    ALT 41 (H) 07/09/2021    AST 38 07/09/2021    ALKPHOS 75 07/09/2021    BILITOT <0.2 07/09/2021     Lab Results   Component Value Date     07/09/2021    K 3.9 07/09/2021    K 4.2 07/08/2021     07/09/2021    CO2 23 07/09/2021    BUN 21 07/09/2021    CREATININE 1.3 07/09/2021    GFRAA >60 07/09/2021    LABGLOM 56 07/09/2021    GLUCOSE 143 07/09/2021    PROT 7.1 07/09/2021    LABALBU 4.3 07/09/2021    CALCIUM 9.8 07/09/2021    BILITOT <0.2 07/09/2021    ALKPHOS 75 07/09/2021    AST 38 07/09/2021    ALT 41 07/09/2021       No results found for: PROTIME, INR    Lab Results   Component Value Date    TSH 1.370 07/09/2021       Lab Results   Component Value Date    COLORU Yellow 11/09/2020    PHUR 6.0 11/09/2020    WBCUA NONE 11/09/2020    RBCUA 0-1 11/09/2020    BACTERIA NONE SEEN 11/09/2020    CLARITYU Clear 11/09/2020    SPECGRAV >=1.030 11/09/2020    LEUKOCYTESUR Negative 11/09/2020    UROBILINOGEN 0.2 11/09/2020    BILIRUBINUR Negative 11/09/2020    BLOODU TRACE-INTACT 11/09/2020    GLUCOSEU 500 11/09/2020       No results found for: AUI5HSH, BEART, F7KXLVJJ, PHART, THGBART, LRL8JUB, PO2ART, ZAT0WXB  Radiology:  CT HUMERUS RIGHT W CONTRAST   Final Result   Findings consistent with cellulitis involving the subcutaneous soft tissues   extending from the level of the elbow dorsally to the level of the mid   humerus. Indistinctness and irregularity of the underlying muscular planes may   represent associated myositis. No evidence of drainable abscess or underlying bony changes. Microbiology:  Pending  No results for input(s): BC in the last 72 hours. No results for input(s): ORG in the last 72 hours. No results for input(s): Esequiel Lav in the last 72 hours. No results for input(s): STREPNEUMAGU in the last 72 hours. No results for input(s): LP1UAG in the last 72 hours. No results for input(s): ASO in the last 72 hours. No results for input(s): CULTRESP in the last 72 hours. Assessment:  · Cellulitis and phlegmon in the deltoid region I doubt that this is a myositis without fever and white count and procalcitonin of only 0.09 and the C-reactive protein is 0.3 and a sed rate of 28. CK was not ordered which would be elevated if this was a myositis.   SPECT in a day or so with IV antibiotics and abscess will develop which needs to be surgical drain     Plan:    · Cont daptomycin and cefazolin  · Check a CK  · Check cultures  · Baseline ESR, CRP  · Monitor labs  · Will follow with you    Thank you for having us see this patient in consultation. I will be discussing this case with the treating physicians.       Electronically signed by Gwen Tovar MD on 7/9/2021 at 10:30 AM

## 2021-07-09 NOTE — CONSULTS
GENERAL SURGERY  CONSULT NOTE  7/9/2021    Physician Consulted: Dr. Jyotsna Hinds  CC: Cellulitis  Referring Physician: Dr. Jimenez BREWER  Juana Ramirez is a 63 y.o. male who presents for evaluation of right arm cellulitis. Patient had a glucose monitor in the right tricep for sometime. He recalls a couple days of heavy physical activity, causing some discomfort in his arm. It became swollen, and patient removed the glucose monitor. It continued to be warm and tender, so patient presented to PCP. He was started on abx with no improvement, so presented to the ED and was started on doxycycline five days ago. There was minimal improvement, so his PCP urged him to come to ED. On exam, patient states the pain, swelling, and erythema have improvement significantly over the past two days. He has minimal discomfort. There are no overlying skin changes or fluctuance. CT of humerus performed on arrival with findings consistent with cellulitis, questionable myositis, and no evidence of drainable abscess.      Past Medical History:   Diagnosis Date    Anxiety     Appendicitis 01/17/2020    Class 1 obesity due to excess calories in adult 12/03/2020    Depression     Diabetes mellitus ([address] 2005    Diabetic nephropathy associated with type 2 diabetes mellitus ([address] 12/03/2020    Gastroesophageal reflux disease without esophagitis 12/03/2020    Hepatic steatosis 12/03/2020    Hyperlipidemia     Hypertension 2005    Infective myositis of right upper extremity 07/08/2021    Kidney stones     X2    Mesenteric hematoma 01/17/2020    Mixed hyperlipidemia 05/13/2021    Proteinuria     Stage 3a chronic kidney disease ([address] 12/03/2020    Type 2 diabetes mellitus without complication ([address]     Vitamin D deficiency 05/18/2021       Past Surgical History:   Procedure Laterality Date    COLONOSCOPY  11/04/2019    Dr. Darwin Medina Tubular adenoma; diverticulosis    LAPAROSCOPIC APPENDECTOMY N/A 1/17/2020    APPENDECTOMY LAPAROSCOPIC, EXPLORATORY LAPAROTOMY performed by Laura Mata MD at [facility] OR       Medications Prior to Admission:    Prior to Admission medications    Medication Sig Start Date End Date Taking?  Authorizing Provider   doxycycline hyclate (VIBRA-TABS) 100 MG tablet Take 1 tablet by mouth 2 times daily for 7 days 7/7/21 7/14/21  Enoc Marcos MD   tamsulosin (FLOMAX) 0.4 MG capsule Take 0.4 mg by mouth daily    Historical Provider, MD   amLODIPine (NORVASC) 10 MG tablet Take 10 mg by mouth daily    Historical Provider, MD   empagliflozin (JARDIANCE) 25 MG tablet Take 25 mg by mouth daily 5/26/21   Gio De Los Santos MD   Continuous Blood Gluc Sensor (FREESTYLE ROCIO 2 SENSOR) MISC To change every 14 days 5/26/21   Gio De Los Santos MD   metFORMIN (GLUCOPHAGE) 1000 MG tablet Take 1,000 mg by mouth 2 times daily (with meals)    Historical Provider, MD   potassium citrate (UROCIT-K) 10 MEQ (1080 MG) extended release tablet Take 10 mEq by mouth 2 times daily (with meals)    Historical Provider, MD   allopurinol (ZYLOPRIM) 100 MG tablet Take 100 mg by mouth daily    Historical Provider, MD   losartan (COZAAR) 100 MG tablet Take 1 tablet by mouth daily 5/5/21   Enoc Marcos MD   sertraline (ZOLOFT) 100 MG tablet Take 1 tablet by mouth daily 12/3/20   Enoc Marcos MD   glyBURIDE (DIABETA) 5 MG tablet Take 1 tablet by mouth 2 times daily 12/3/20   Enoc Marcos MD   fenofibrate micronized (LOFIBRA) 134 MG capsule Take 1 capsule by mouth every morning (before breakfast) 12/3/20   Enoc Marcos MD   metoprolol succinate (TOPROL XL) 50 MG extended release tablet Take 1 tablet by mouth 2 times daily 12/3/20   Enoc Marcos MD   pravastatin (PRAVACHOL) 40 MG tablet Take 1 tablet by mouth daily 9/8/20   Enoc Marcos MD   aspirin 81 MG tablet Take 81 mg by mouth daily    Historical Provider, MD       No Known Allergies    Family History   Problem Relation Age of Onset    Other Mother         Alive age 80 2020; minimal arthritis    Arthritis Mother     Atrial Fibrillation Mother     Liver Disease Mother     Diabetes Father         Alive age 80 26    Hypertension Father     High Blood Pressure Father     Heart Surgery Father        Social History     Tobacco Use    Smoking status: Never Smoker    Smokeless tobacco: Never Used   Vaping Use    Vaping Use: Never used   Substance Use Topics    Alcohol use: Never    Drug use: Never         Review of Systems   General ROS: negative  Hematological and Lymphatic ROS: negative  Respiratory ROS: negative  Cardiovascular ROS: negative  Gastrointestinal ROS: negative  Genito-Urinary ROS: negative  Musculoskeletal ROS: positive for - pain in right tricep      PHYSICAL EXAM:    Vitals:    07/09/21 0415   BP: (!) 128/90   Pulse: 73   Resp: 20   Temp: 98.2 °F (36.8 °C)   SpO2: 92%       General Appearance:  awake, alert, oriented, in no acute distress  Skin:  Skin color, texture, turgor normal. No rashes or lesions. Head/face:  NCAT  Eyes:  No gross abnormalities. Lungs:  Normal respiratory effort on room air  Heart:  Heart regular rate and rhythm  Abdomen:  Soft, non-tender, non-distended, no rebound or guarding  Musculoskeletal: Extremities warm to touch, pink, with no edema. Right tricep with minimal erythema, no fluctuance, minimally tender to palpation  Male Rectal:  Rectal exam deferred. LABS:  CBC  Recent Labs     07/08/21  1619   WBC 6.2   HGB 13.3   HCT 40.2        BMP  Recent Labs     07/08/21  1619      K 4.2      CO2 25   BUN 24*   CREATININE 1.1   CALCIUM 10.5*     Liver Function  No results for input(s): AMYLASE, LIPASE, BILITOT, BILIDIR, AST, ALT, ALKPHOS, PROT, LABALBU in the last 72 hours. No results for input(s): LACTATE in the last 72 hours. No results for input(s): INR, PTT in the last 72 hours.     Invalid input(s): PT    RADIOLOGY  CT HUMERUS RIGHT W CONTRAST    Result Date: 7/8/2021  EXAMINATION: CT OF THE RIGHT HUMERUS WITH CONTRAST 7/8/2021 4:36 pm TECHNIQUE: CT of the right humerus was performed with the administration of intravenous contrast.  Multiplanar reformatted images are provided for review. Dose modulation, iterative reconstruction, and/or weight based adjustment of the mA/kV was utilized to reduce the radiation dose to as low as reasonably achievable. COMPARISON: None. HISTORY ORDERING SYSTEM PROVIDED HISTORY: large abscess, no improvement abx x 2 TECHNOLOGIST PROVIDED HISTORY: Reason for exam:->large abscess, no improvement abx x 2 Decision Support Exception - unselect if not a suspected or confirmed emergency medical condition->Emergency Medical Condition (MA) FINDINGS: There is increased attenuation and stranding of the subcutaneous fat extending from the level of the elbow dorsally to the level of the mid humerus, consistent with cellulitis. There is indistinctness and contour irregularity of the underlying muscular planes, which may represent myositis. There are no organized circumscribed peripherally enhancing fluid collections to suggest drainable abscess. There are no underlying erosive or lytic bony changes. There is no evidence of soft tissue gas. Findings consistent with cellulitis involving the subcutaneous soft tissues extending from the level of the elbow dorsally to the level of the mid humerus. Indistinctness and irregularity of the underlying muscular planes may represent associated myositis. No evidence of drainable abscess or underlying bony changes. ASSESSMENT:  63 y.o. male with right arm cellulitis, now resolved. PLAN:  - continue IV abx per ID recommendations  - no role for drainage at this time as symptoms have resolved and there is no discrete abscess seen on imaging  - okay for diet from surgical perspective      Discussed with chief surgical resident Dr. Hugh Fregoso and attending Dr. Yoel Castañeda.      Electronically signed by Rebecca Smyth MD on 7/9/21 at 4:55 AM EDT    No abscess to drain at this time. The patient is anxious to go home. I told him I can evaluate him in the office on Monday and drain as necessary at that time. [id number] Ju[address] Thornton for [facility] from surgical standpoint. I saw and examined the patient. I reviewed the above resident's note. I agree with the assessment and plan as outlined.         Jessenia Garvey MD  General Surgery

## 2021-07-09 NOTE — PROGRESS NOTES
Occupational Therapy  Date:7/9/2021  Patient Name: Caty Juan  Room: [facility]-     Occupational Therapy (OT) order received; patient reported that he is independent with ADLs and functional transfers/mobility and declined completion of OT evaluation. No OT evaluation completed, per patient preference. Micki Meng, OTR/L  License Number: [id number]

## 2021-07-10 VITALS
DIASTOLIC BLOOD PRESSURE: 75 MMHG | SYSTOLIC BLOOD PRESSURE: 143 MMHG | HEIGHT: 66 IN | RESPIRATION RATE: 18 BRPM | TEMPERATURE: 98.1 F | OXYGEN SATURATION: 96 % | WEIGHT: 202.7 LBS | HEART RATE: 76 BPM | BODY MASS INDEX: 32.58 KG/M2

## 2021-07-10 PROBLEM — L03.90 CELLULITIS: Status: ACTIVE | Noted: 2021-07-10

## 2021-07-10 LAB
ALBUMIN SERPL-MCNC: 4.1 G/DL (ref 3.5–5.2)
ALP BLD-CCNC: 49 U/L (ref 40–129)
ALT SERPL-CCNC: 47 U/L (ref 0–40)
ANION GAP SERPL CALCULATED.3IONS-SCNC: 11 MMOL/L (ref 7–16)
AST SERPL-CCNC: 52 U/L (ref 0–39)
BASOPHILS ABSOLUTE: 0.03 E9/L (ref 0–0.2)
BASOPHILS RELATIVE PERCENT: 0.6 % (ref 0–2)
BILIRUB SERPL-MCNC: 0.3 MG/DL (ref 0–1.2)
BILIRUBIN DIRECT: <0.2 MG/DL (ref 0–0.3)
BILIRUBIN, INDIRECT: ABNORMAL MG/DL (ref 0–1)
BUN BLDV-MCNC: 15 MG/DL (ref 6–20)
C-REACTIVE PROTEIN: 0.3 MG/DL (ref 0–0.4)
CALCIUM SERPL-MCNC: 9.3 MG/DL (ref 8.6–10.2)
CHLORIDE BLD-SCNC: 102 MMOL/L (ref 98–107)
CO2: 25 MMOL/L (ref 22–29)
CREAT SERPL-MCNC: 1 MG/DL (ref 0.7–1.2)
EOSINOPHILS ABSOLUTE: 0.14 E9/L (ref 0.05–0.5)
EOSINOPHILS RELATIVE PERCENT: 2.7 % (ref 0–6)
GFR AFRICAN AMERICAN: >60
GFR NON-AFRICAN AMERICAN: >60 ML/MIN/1.73
GLUCOSE BLD-MCNC: 164 MG/DL (ref 74–99)
HCT VFR BLD CALC: 37.2 % (ref 37–54)
HEMOGLOBIN: 12.3 G/DL (ref 12.5–16.5)
IMMATURE GRANULOCYTES #: 0.06 E9/L
IMMATURE GRANULOCYTES %: 1.2 % (ref 0–5)
LACTIC ACID, SEPSIS: 1.6 MMOL/L (ref 0.5–1.9)
LYMPHOCYTES ABSOLUTE: 1.05 E9/L (ref 1.5–4)
LYMPHOCYTES RELATIVE PERCENT: 20.5 % (ref 20–42)
MAGNESIUM: 2 MG/DL (ref 1.6–2.6)
MCH RBC QN AUTO: 29.4 PG (ref 26–35)
MCHC RBC AUTO-ENTMCNC: 33.1 % (ref 32–34.5)
MCV RBC AUTO: 89 FL (ref 80–99.9)
METER GLUCOSE: 131 MG/DL (ref 74–99)
METER GLUCOSE: 201 MG/DL (ref 74–99)
MONOCYTES ABSOLUTE: 0.46 E9/L (ref 0.1–0.95)
MONOCYTES RELATIVE PERCENT: 9 % (ref 2–12)
NEUTROPHILS ABSOLUTE: 3.37 E9/L (ref 1.8–7.3)
NEUTROPHILS RELATIVE PERCENT: 66 % (ref 43–80)
PDW BLD-RTO: 13.2 FL (ref 11.5–15)
PHOSPHORUS: 3 MG/DL (ref 2.5–4.5)
PLATELET # BLD: 152 E9/L (ref 130–450)
PMV BLD AUTO: 9.5 FL (ref 7–12)
POTASSIUM SERPL-SCNC: 4 MMOL/L (ref 3.5–5)
RBC # BLD: 4.18 E12/L (ref 3.8–5.8)
SEDIMENTATION RATE, ERYTHROCYTE: 28 MM/HR (ref 0–15)
SODIUM BLD-SCNC: 138 MMOL/L (ref 132–146)
TOTAL PROTEIN: 6.7 G/DL (ref 6.4–8.3)
WBC # BLD: 5.1 E9/L (ref 4.5–11.5)

## 2021-07-10 PROCEDURE — 6370000000 HC RX 637 (ALT 250 FOR IP): Performed by: INTERNAL MEDICINE

## 2021-07-10 PROCEDURE — 80076 HEPATIC FUNCTION PANEL: CPT

## 2021-07-10 PROCEDURE — 82962 GLUCOSE BLOOD TEST: CPT

## 2021-07-10 PROCEDURE — 85651 RBC SED RATE NONAUTOMATED: CPT

## 2021-07-10 PROCEDURE — 99232 SBSQ HOSP IP/OBS MODERATE 35: CPT | Performed by: SURGERY

## 2021-07-10 PROCEDURE — 83605 ASSAY OF LACTIC ACID: CPT

## 2021-07-10 PROCEDURE — 83735 ASSAY OF MAGNESIUM: CPT

## 2021-07-10 PROCEDURE — 96376 TX/PRO/DX INJ SAME DRUG ADON: CPT

## 2021-07-10 PROCEDURE — 2580000003 HC RX 258: Performed by: SPECIALIST

## 2021-07-10 PROCEDURE — 6360000002 HC RX W HCPCS: Performed by: SPECIALIST

## 2021-07-10 PROCEDURE — 84100 ASSAY OF PHOSPHORUS: CPT

## 2021-07-10 PROCEDURE — 36415 COLL VENOUS BLD VENIPUNCTURE: CPT

## 2021-07-10 PROCEDURE — 86140 C-REACTIVE PROTEIN: CPT

## 2021-07-10 PROCEDURE — 6360000002 HC RX W HCPCS: Performed by: INTERNAL MEDICINE

## 2021-07-10 PROCEDURE — 80048 BASIC METABOLIC PNL TOTAL CA: CPT

## 2021-07-10 PROCEDURE — 96366 THER/PROPH/DIAG IV INF ADDON: CPT

## 2021-07-10 PROCEDURE — 85025 COMPLETE CBC W/AUTO DIFF WBC: CPT

## 2021-07-10 RX ORDER — LIDOCAINE HYDROCHLORIDE AND EPINEPHRINE 10; 10 MG/ML; UG/ML
20 INJECTION, SOLUTION INFILTRATION; PERINEURAL ONCE
Status: DISCONTINUED | OUTPATIENT
Start: 2021-07-10 | End: 2021-07-10 | Stop reason: HOSPADM

## 2021-07-10 RX ORDER — CEPHALEXIN 500 MG/1
1000 CAPSULE ORAL EVERY 6 HOURS
Qty: 56 CAPSULE | Refills: 0 | Status: SHIPPED | OUTPATIENT
Start: 2021-07-10 | End: 2021-07-17

## 2021-07-10 RX ORDER — DOXYCYCLINE HYCLATE 100 MG/1
100 CAPSULE ORAL 2 TIMES DAILY
Qty: 14 CAPSULE | Refills: 0 | Status: SHIPPED | OUTPATIENT
Start: 2021-07-10 | End: 2021-07-17

## 2021-07-10 RX ADMIN — ALLOPURINOL 100 MG: 100 TABLET ORAL at 10:18

## 2021-07-10 RX ADMIN — ASPIRIN 81 MG: 81 TABLET, COATED ORAL at 10:18

## 2021-07-10 RX ADMIN — LOSARTAN POTASSIUM 100 MG: 50 TABLET, FILM COATED ORAL at 10:18

## 2021-07-10 RX ADMIN — INSULIN LISPRO 4 UNITS: 100 INJECTION, SOLUTION INTRAVENOUS; SUBCUTANEOUS at 12:32

## 2021-07-10 RX ADMIN — FENOFIBRATE 160 MG: 160 TABLET ORAL at 10:17

## 2021-07-10 RX ADMIN — Medication 2000 MG: at 03:38

## 2021-07-10 RX ADMIN — METOPROLOL SUCCINATE 50 MG: 50 TABLET, EXTENDED RELEASE ORAL at 10:18

## 2021-07-10 RX ADMIN — POTASSIUM CITRATE 10 MEQ: 5 TABLET ORAL at 10:18

## 2021-07-10 RX ADMIN — TAMSULOSIN HYDROCHLORIDE 0.4 MG: 0.4 CAPSULE ORAL at 10:17

## 2021-07-10 RX ADMIN — PANTOPRAZOLE SODIUM 40 MG: 40 TABLET, DELAYED RELEASE ORAL at 05:47

## 2021-07-10 RX ADMIN — GLYBURIDE 5 MG: 5 TABLET ORAL at 10:17

## 2021-07-10 RX ADMIN — DAPTOMYCIN 450 MG: 500 INJECTION, POWDER, LYOPHILIZED, FOR SOLUTION INTRAVENOUS at 11:37

## 2021-07-10 RX ADMIN — SERTRALINE 100 MG: 100 TABLET, FILM COATED ORAL at 10:18

## 2021-07-10 RX ADMIN — Medication 2000 MG: at 11:37

## 2021-07-10 ASSESSMENT — PAIN SCALES - GENERAL
PAINLEVEL_OUTOF10: 0
PAINLEVEL_OUTOF10: 0

## 2021-07-10 NOTE — PROGRESS NOTES
[address] [place] [address] [facility]  Progress Note    SUBJECTIVE:  Chief Complaint   Patient presents with    Arm Pain     sent in by jennifer for right arm abscess that has been ongoing for over a month     Wound Check     Patient is tolerating medications. No reported adverse drug reactions. No nausea, vomiting, diarrhea. No fevers overnight. Tolerating atb. Minimal discomfort right arm. Wants discharged today    Review of systems:  As stated above in the chief complaint, otherwise negative. Medications:  Scheduled Meds:   ceFAZolin  2,000 mg Intravenous Q8H    daptomycin (CUBICIN) IVPB  6 mg/kg (Adjusted) Intravenous Q24H    allopurinol  100 mg Oral Daily    amLODIPine  10 mg Oral Daily    aspirin  81 mg Oral Daily    fenofibrate  160 mg Oral Daily    tamsulosin  0.4 mg Oral Daily    sertraline  100 mg Oral Daily    potassium citrate  10 mEq Oral BID WC    metoprolol succinate  50 mg Oral BID    losartan  100 mg Oral Daily    glyBURIDE  5 mg Oral BID    insulin lispro  0-12 Units Subcutaneous TID WC    insulin lispro  0-6 Units Subcutaneous Nightly    enoxaparin  40 mg Subcutaneous Daily    pantoprazole  40 mg Oral QAM AC    pravastatin  40 mg Oral Nightly     Continuous Infusions:   sodium chloride 100 mL/hr at 21 1950    dextrose       PRN Meds:glucose, dextrose, glucagon (rDNA), dextrose, morphine, HYDROcodone 5 mg - acetaminophen, acetaminophen    OBJECTIVE:  /87   Pulse 71   Temp 97.7 °F (36.5 °C) (Oral)   Resp 18   Ht 5' 6\" (1.676 m)   Wt 202 lb 11.2 oz (91.9 kg)   SpO2 96%   BMI 32.72 kg/m²   Temp  Av.4 °F (36.9 °C)  Min: 97.7 °F (36.5 °C)  Max: 99.1 °F (37.3 °C)  Constitutional: The patient is awake, alert, and oriented. Sitting up in chair in room  Skin: Warm and dry. No rashes were noted. Right posterior arm just superior to elbow with erythema, induration, no open wound. Not fluctuant.  Mild tenderness to palp  HEENT: Round and reactive pupils. Moist mucous membranes. No ulcerations or thrush. Neck: Supple to movements. Chest: No use of accessory muscles to breathe. Symmetrical expansion. No wheezing, crackles or rhonchi. Cardiovascular: S1 and S2 are rhythmic and regular. No murmurs appreciated. Abdomen: Positive bowel sounds to auscultation. Benign to palpation. No masses felt. No hepatosplenomegaly. Extremities: No clubbing, no cyanosis, no edema.   Lines: peripheral    Laboratory and Tests Review:  Lab Results   Component Value Date    WBC 5.1 07/10/2021    WBC 7.3 07/09/2021    WBC 6.2 07/08/2021    HGB 12.3 (L) 07/10/2021    HCT 37.2 07/10/2021    MCV 89.0 07/10/2021     07/10/2021     Lab Results   Component Value Date    NEUTROABS 3.37 07/10/2021    NEUTROABS 5.06 07/09/2021    NEUTROABS 4.39 07/08/2021     No results found for: CRPHS  Lab Results   Component Value Date    ALT 47 (H) 07/10/2021    AST 52 (H) 07/10/2021    ALKPHOS 49 07/10/2021    BILITOT 0.3 07/10/2021     Lab Results   Component Value Date     07/10/2021    K 4.0 07/10/2021    K 4.2 07/08/2021     07/10/2021    CO2 25 07/10/2021    BUN 15 07/10/2021    CREATININE 1.0 07/10/2021    CREATININE 1.3 07/09/2021    CREATININE 1.1 07/08/2021    GFRAA >60 07/10/2021    LABGLOM >60 07/10/2021    GLUCOSE 164 07/10/2021    PROT 6.7 07/10/2021    LABALBU 4.1 07/10/2021    CALCIUM 9.3 07/10/2021    BILITOT 0.3 07/10/2021    ALKPHOS 49 07/10/2021    AST 52 07/10/2021    ALT 47 07/10/2021     Lab Results   Component Value Date    CRP 0.3 07/09/2021     Lab Results   Component Value Date    SEDRATE 28 (H) 07/09/2021       Radiology:      Microbiology:   Blood cx no growth    ASSESSMENT:  · Cellulitis and phlegmon in the deltoid region     Plan:    · Switch to p.o. for discharge i.e. cephalexin and doxy  · Discussed with surgery about I&D on Monday  · Recommend stopping daily CRP/WSR  · F/u cx  · Monitor labs  · Will follow with you    Sissy Torres, APRN - CNP  9:25 AM  7/10/2021     Pt seen and examined. Above discussed agree with advanced practice nurse. Labs, cultures, and radiographs reviewed. Face to Face encounter occurred. Changes made as necessary.      Mayur Fulton MD

## 2021-07-10 NOTE — PROGRESS NOTES
Physician Progress Note      PATIENT:               Radha Gonzales  CSN #:                  [id number]  :                       1960  ADMIT DATE:       2021 2:54 PM  DISCH DATE:  RESPONDING  PROVIDER #:        JESUS BETANCOURT [address] [place] [address] DO          QUERY TEXT:    Patient admitted with \". Kenny Rathke Kenny Rathke Kenny Rathke Cellulitis right upper extremity. .. \". Noted   documentation of \". Kenny Rathke Kenny Rathke Infective myositis of right upper extremity. Kenny Rathke Kenny Rathke \" in H&P   and ID note states, \". Kenny Rathke Kenny Rathke Kenny Rathke Cellulitis and phlegmon in the deltoid region I doubt   that this is a myositis without fever and white count and procalcitonin of   only 0.09 and the C-reactive protein is 0.3 and a sed rate of 28. Kenny Rathke Kenny Rathke \". If possible, please document in progress notes and discharge summary if you   are evaluating and /or treating any of the following: The medical record reflects the following:  Risk Factors: DM, cellulitis  Clinical Indicators: per ID notes, \". Kenny Rathke Kenny Rathke Cellulitis and phlegmon in the deltoid   region I doubt that this is a myositis without fever and white count and   procalcitonin of only 0.09 and the C-reactive protein is 0.3 and a sed rate of   28. Kenny Rathke Kenny Rathke \";  (WNL);  CT-Indistinctness and irregularity of the underlying   muscular planes may represent associated myositis  Treatment:  IV daptomycin and cefazolin  Options provided:  -- Infective myositis confirmed  -- Infective myositis ruled out, cellulitis of RUE only  -- Other - I will add my own diagnosis  -- Disagree - Not applicable / Not valid  -- Disagree - Clinically unable to determine / Unknown  -- Refer to Clinical Documentation Reviewer    PROVIDER RESPONSE TEXT:    After study, infective myositis was ruled out, RUE cellulitis only confirmed    Query created by: Anastacio Chowdary on 7/10/2021 9:59 AM      Electronically signed by:  Boyd Tariq DO 7/10/2021 11:51 AM

## 2021-07-10 NOTE — PROGRESS NOTES
Secure message sent to Dr. Bernice Villasenor re: discharge. Per Dr. Bernice Villasenor pt okay for discharge at this time, call office on Monday for same day appointment.

## 2021-07-10 NOTE — PLAN OF CARE
Problem: Safety:  Goal: Free from accidental physical injury  Description: Free from accidental physical injury  Outcome: Completed  Goal: Free from intentional harm  Description: Free from intentional harm  Outcome: Completed     Problem: Daily Care:  Goal: Daily care needs are met  Description: Daily care needs are met  Outcome: Completed     Problem: Pain:  Goal: Patient's pain/discomfort is manageable  Description: Patient's pain/discomfort is manageable  Outcome: Completed     Problem: Skin Integrity:  Goal: Skin integrity will stabilize  Description: Skin integrity will stabilize  Outcome: Completed     Problem: Discharge Planning:  Goal: Patients continuum of care needs are met  Description: Patients continuum of care needs are met  Outcome: Completed

## 2021-07-10 NOTE — PROGRESS NOTES
General Surgery  Attending Physician Progress Note    Chief Complaint   Patient presents with    Arm Pain     sent in by jennifer for right arm abscess that has been ongoing for over a month     Wound Check       Subjective: The patient said he feels well. No change in his arm pain or symptoms. ROS: no cp    I reviewed the patient's Past Medical History, Past Surgical History, Medications, and Allergies. LABS:  CBC:   Lab Results   Component Value Date    WBC 5.1 07/10/2021    RBC 4.18 07/10/2021    HGB 12.3 07/10/2021    HCT 37.2 07/10/2021    MCV 89.0 07/10/2021    MCH 29.4 07/10/2021    MCHC 33.1 07/10/2021    RDW 13.2 07/10/2021     07/10/2021    MPV 9.5 07/10/2021     CMP:    Lab Results   Component Value Date     07/10/2021    K 4.0 07/10/2021    K 4.2 07/08/2021     07/10/2021    CO2 25 07/10/2021    BUN 15 07/10/2021    CREATININE 1.0 07/10/2021    GFRAA >60 07/10/2021    LABGLOM >60 07/10/2021    GLUCOSE 164 07/10/2021    PROT 6.7 07/10/2021    LABALBU 4.1 07/10/2021    CALCIUM 9.3 07/10/2021    BILITOT 0.3 07/10/2021    ALKPHOS 49 07/10/2021    AST 52 07/10/2021    ALT 47 07/10/2021     BMP:    Lab Results   Component Value Date     07/10/2021    K 4.0 07/10/2021    K 4.2 07/08/2021     07/10/2021    CO2 25 07/10/2021    BUN 15 07/10/2021    LABALBU 4.1 07/10/2021    CREATININE 1.0 07/10/2021    CALCIUM 9.3 07/10/2021    GFRAA >60 07/10/2021    LABGLOM >60 07/10/2021     Hepatic Function Panel:    Lab Results   Component Value Date    ALKPHOS 49 07/10/2021    ALT 47 07/10/2021    AST 52 07/10/2021    PROT 6.7 07/10/2021    BILITOT 0.3 07/10/2021    BILIDIR <0.2 07/10/2021    IBILI see below 07/10/2021    LABALBU 4.1 07/10/2021       Physical Exam:  Vitals:    07/10/21 0330   BP: 135/87   Pulse: 71   Resp: 18   Temp: 97.7 °F (36.5 °C)   SpO2: 96%       General appearance: alert, cooperative and in no acute distress.   Lungs: normal work of breathing  Heart: regular rate  Abdomen: soft, non-tender; non distended  Extremities: Right upper arm cellulitis, no fluctuant area felt yet    Impression/Plan:      Assessment: Malik Marie is an 63 y.o. male who presents with RUE cellulitis    Plan: Antibiotics per ID  Still does not appear in need of I and D yet    Electronically by Teri Polanco MD, FACS, General Surgery  on 7/10/2021 at 1:13 PM

## 2021-07-10 NOTE — PROGRESS NOTES
PROGRESS  NOTE --                                                          INTERNAL  MEDICINE                                                                              I  PERSONALLY SAW , EXAMINED, AND CARED Dennise 5, 7/10/2021     LABS, XRAY ,CHART, AND MEDICATIONS  REVIEWED BY ME       Chief complaint: Cellulitis right upper extremity      7/10/2021-SUBJECTIVE: Jasper Acevedo is alert awake and cooperative; oriented ×3. Denies any chest pain dyspnea nausea emesis. Tolerating diet. No abdominal pain. His right arm is feeling much better; he would like to be discharged on oral antibiotics if possible. 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 He questions why Jardiance and Metformin were held, I advised him that will be resumed at discharge. Current temperature 97.7. Highest temperature last 24 hours 99.1. Room air, SPO2 96%. Intake and output +240 cc. Most recent glucose 164. ALT AST slightly elevated due to fatty liver. WBC 5.1 hemoglobin 12.3. CRP 0.3; ESR 28. A1c 6.7. TSH 1.370. Blood cultures negative to date. 2D echo completed however results pending. ID note from today, continue daptomycin and cefazolin IV, monitor labs follow cultures.     Objective:     PHYSICAL EXAM:    VS: /87   Pulse 71   Temp 97.7 °F (36.5 °C) (Oral)   Resp 18   Ht 5' 6\" (1.676 m)   Wt 202 lb 11.2 oz (91.9 kg)   SpO2 96%   BMI 32.72 kg/m²     Labs:   CBC:   Lab Results   Component Value Date    WBC 5.1 07/10/2021    RBC 4.18 07/10/2021    HGB 12.3 07/10/2021    HCT 37.2 07/10/2021    MCV 89.0 07/10/2021    MCH 29.4 07/10/2021    MCHC 33.1 07/10/2021    RDW 13.2 07/10/2021     07/10/2021    MPV 9.5 07/10/2021     CBC with Differential:    Lab Results   Component Value Date    WBC 5.1 07/10/2021    RBC 4.18 07/10/2021    HGB 12.3 07/10/2021    HCT 37.2 07/10/2021     07/10/2021    MCV 89.0 07/10/2021    MCH 29.4 07/10/2021    MCHC 33.1 07/10/2021    RDW 13.2 07/10/2021    LYMPHOPCT 20.5 07/10/2021    MONOPCT 9.0 07/10/2021    BASOPCT 0.6 07/10/2021    MONOSABS 0.46 07/10/2021    LYMPHSABS 1.05 07/10/2021    EOSABS 0.14 07/10/2021    BASOSABS 0.03 07/10/2021     Hemoglobin/Hematocrit:    Lab Results   Component Value Date    HGB 12.3 07/10/2021    HCT 37.2 07/10/2021     CMP:    Lab Results   Component Value Date     07/10/2021    K 4.0 07/10/2021    K 4.2 07/08/2021     07/10/2021    CO2 25 07/10/2021    BUN 15 07/10/2021    CREATININE 1.0 07/10/2021    GFRAA >60 07/10/2021    LABGLOM >60 07/10/2021    GLUCOSE 164 07/10/2021    PROT 6.7 07/10/2021    LABALBU 4.1 07/10/2021    CALCIUM 9.3 07/10/2021    BILITOT 0.3 07/10/2021    ALKPHOS 49 07/10/2021    AST 52 07/10/2021    ALT 47 07/10/2021     BMP:    Lab Results   Component Value Date     07/10/2021    K 4.0 07/10/2021    K 4.2 07/08/2021     07/10/2021    CO2 25 07/10/2021    BUN 15 07/10/2021    LABALBU 4.1 07/10/2021    CREATININE 1.0 07/10/2021    CALCIUM 9.3 07/10/2021    GFRAA >60 07/10/2021    LABGLOM >60 07/10/2021    GLUCOSE 164 07/10/2021     Hepatic Function Panel:    Lab Results   Component Value Date    ALKPHOS 49 07/10/2021    ALT 47 07/10/2021    AST 52 07/10/2021    PROT 6.7 07/10/2021    BILITOT 0.3 07/10/2021    BILIDIR <0.2 07/10/2021    IBILI see below 07/10/2021    LABALBU 4.1 07/10/2021     Ionized Calcium:  No results found for: IONCA  Magnesium:    Lab Results   Component Value Date    MG 2.0 07/10/2021     Phosphorus:    Lab Results   Component Value Date    PHOS 3.0 07/10/2021     LDH:  No results found for: LDH  Uric Acid:    Lab Results   Component Value Date    LABURIC 5.5 07/09/2021     PT/INR:  No results found for: PROTIME, INR  Warfarin PT/INR:  No components found for: PTPATWAR, PTINRWAR  PTT:  No results found for: APTT, PTT[APTT}  Troponin:  No results found for: TROPONINI  Last 3 Troponin:  No results found for: TROPONINI  U/A:    Lab Results   Component Value Date    COLORU Yellow 11/09/2020    PROTEINU >=300 11/09/2020    PHUR 6.0 11/09/2020    WBCUA NONE 11/09/2020    RBCUA 0-1 11/09/2020    BACTERIA NONE SEEN 11/09/2020    CLARITYU Clear 11/09/2020    SPECGRAV >=1.030 11/09/2020    LEUKOCYTESUR Negative 11/09/2020    UROBILINOGEN 0.2 11/09/2020    BILIRUBINUR Negative 11/09/2020    BLOODU TRACE-INTACT 11/09/2020    GLUCOSEU 500 11/09/2020     HgBA1c:    Lab Results   Component Value Date    LABA1C 6.7 07/09/2021     FLP:    Lab Results   Component Value Date    TRIG 599 07/09/2021    HDL 40 07/09/2021    LDLCALC - 07/09/2021    LABVLDL - 07/09/2021     TSH:    Lab Results   Component Value Date    TSH 1.370 07/09/2021     VITAMIN B12: No components found for: B12  FOLATE:    Lab Results   Component Value Date    FOLATE 10.5 07/09/2021     IRON:  No results found for: IRON  Iron Saturation:  No components found for: PERCENTFE  TIBC:  No results found for: TIBC  FERRITIN:  No results found for: FERRITIN     General appearance: Alert, Awake, Oriented times 3, no distress  Skin: Warm and dry ; no rashes  Head: Normocephalic. No masses, lesions or tenderness noted  Eyes: Conjunctivae pink, sclera white. PERRL,EOM-I  Ears: External ears normal  Nose/Sinuses: Nares normal. Septum midline. Mucosa normal. No drainage  Oropharynx: Oropharynx clear with no exudate seen  Neck: Supple. No jugular venous distension, lymphadenopathy or thyromegaly Trachea midline  Lungs: Clear to auscultation bilaterally. No rhonchi, crackles or wheezes  Heart: S1 S2  Regular rate and rhythm. No rub, murmur or gallop  Abdomen: Soft, non-tender. BS normal. No masses, organomegaly; no rebound or guarding  Extremities: No lower extremity edema. Peripheral pulses palpable. Mild induration of mid triceps area less painful to percussion.   Still some discomfort if he stretches tricep, put right hand on the back of his head. Musculoskeletal: Muscular strength appears intact. Neuro:  No focal motor defects ; II-XII grossly intact . MAHAN equally    TELEMETRY: REVIEWED--Telemetry: Sinus    ASSESSMENT:   Principal Problem:    Infective myositis of right upper extremity  Active Problems:    Type 2 diabetes mellitus without complication, with long-term current use of insulin (HCC)    Diabetic nephropathy associated with type 2 diabetes mellitus (HCC)    Stage 3a chronic kidney disease (HCC)    Infective myositis, right upper arm  Resolved Problems:    * No resolved hospital problems. *      PLAN:  SEE ORDERS      RE  CHANGES AND FINDINGS   Medications reviewed with patient  GI prophylaxis  DVT prophylaxis  Consultants notes reviewed  I messaged ID service regarding potential oral antibiotics and discharge today since patient is primary caregiver of his parents  Patient has appointment Monday, 12/12/2021 with surgery if incision and drainage are needed  Restart Jardiance 25 mg daily at discharge  Restart Metformin 1000 mg twice daily at discharge      Discussed with patient and nursing. Burt Allen DO  DO     11:49 AM     7/10/2021    TIME > 25 MINUTES    >  50 %  OF  TIME  DISCUSSION               ------------  INFORMATION  -----------      DIET:ADULT DIET; Regular; 4 carb choices (60 gm/meal);  Low Fat/Low Chol/High Fiber/2 gm Na      No Known Allergies      MEDICATION SIDE EFFECTS:none       SCHEDULED MEDS:                                 Scheduled Meds:   ceFAZolin  2,000 mg Intravenous Q8H    daptomycin (CUBICIN) IVPB  6 mg/kg (Adjusted) Intravenous Q24H    allopurinol  100 mg Oral Daily    amLODIPine  10 mg Oral Daily    aspirin  81 mg Oral Daily    fenofibrate  160 mg Oral Daily    tamsulosin  0.4 mg Oral Daily    sertraline  100 mg Oral Daily    potassium citrate  10 mEq Oral BID WC    metoprolol succinate  50 mg Oral BID    losartan  100 mg Oral Daily    glyBURIDE  5 mg Oral BID    insulin lispro  0-12 Units Subcutaneous TID WC    insulin lispro  0-6 Units Subcutaneous Nightly    enoxaparin  40 mg Subcutaneous Daily    pantoprazole  40 mg Oral QAM AC    pravastatin  40 mg Oral Nightly       Continuous Infusions:   sodium chloride 100 mL/hr at 07/09/21 1950    dextrose           Data:       Intake/Output Summary (Last 24 hours) at 7/10/2021 1149  Last data filed at 7/10/2021 1034  Gross per 24 hour   Intake 240 ml   Output --   Net 240 ml       Wt Readings from Last 3 Encounters:   07/08/21 202 lb 11.2 oz (91.9 kg)   07/07/21 210 lb (95.3 kg)   06/29/21 210 lb (95.3 kg)       Labs: Additional    GLUCOSE:No results for input(s): POCGLU in the last 72 hours. BNP:No results found for: BNP    CRP:   Recent Labs     07/09/21  0450 07/10/21  0718   CRP 0.3 0.3       ESR:  Recent Labs     07/09/21  0450 07/10/21  0718   SEDRATE 28* 28*       RADIOLOGY: REVIEWED AVAILABLE REPORT  CT HUMERUS RIGHT W CONTRAST   Final Result   Findings consistent with cellulitis involving the subcutaneous soft tissues   extending from the level of the elbow dorsally to the level of the mid   humerus. Indistinctness and irregularity of the underlying muscular planes may   represent associated myositis. No evidence of drainable abscess or underlying bony changes.                    Katherine Scott DO DO   11:49 AM     7/10/2021      Voice recognition software used for dictation

## 2021-07-12 ENCOUNTER — TELEPHONE (OUTPATIENT)
Dept: PRIMARY CARE CLINIC | Age: 61
End: 2021-07-12

## 2021-07-12 ENCOUNTER — OFFICE VISIT (OUTPATIENT)
Dept: SURGERY | Age: 61
End: 2021-07-12
Payer: COMMERCIAL

## 2021-07-12 VITALS
TEMPERATURE: 97.9 F | SYSTOLIC BLOOD PRESSURE: 144 MMHG | BODY MASS INDEX: 34.13 KG/M2 | HEIGHT: 66 IN | OXYGEN SATURATION: 97 % | WEIGHT: 212.4 LBS | RESPIRATION RATE: 16 BRPM | HEART RATE: 87 BPM | DIASTOLIC BLOOD PRESSURE: 86 MMHG

## 2021-07-12 DIAGNOSIS — L02.91 ABSCESS: Primary | ICD-10-CM

## 2021-07-12 PROCEDURE — 10061 I&D ABSCESS COMP/MULTIPLE: CPT | Performed by: SURGERY

## 2021-07-12 RX ORDER — LIDOCAINE HYDROCHLORIDE AND EPINEPHRINE 10; 10 MG/ML; UG/ML
20 INJECTION, SOLUTION INFILTRATION; PERINEURAL ONCE
Status: COMPLETED | OUTPATIENT
Start: 2021-07-12 | End: 2021-07-12

## 2021-07-12 RX ADMIN — LIDOCAINE HYDROCHLORIDE AND EPINEPHRINE 20 ML: 10; 10 INJECTION, SOLUTION INFILTRATION; PERINEURAL at 17:32

## 2021-07-12 NOTE — TELEPHONE ENCOUNTER
[facility] Transitions Initial Follow Up Call    Outreach made within 2 business days of discharge: Yes    Patient: Joan Oakley Patient : 1960   MRN: [id number]  Reason for Admission: There are no discharge diagnoses documented for the most recent discharge. Discharge Date: 7/10/21       Spoke with: Harmony Burton    Discharge department/facility: home    TCM Interactive Patient Contact:  Was patient able to fill all prescriptions: Yes  Was patient instructed to bring all medications to the follow-up visit: Yes  Is patient taking all medications as directed in the discharge summary?  Yes  Does patient understand their discharge instructions: Yes  Does patient have questions or concerns that need addressed prior to 7-14 day follow up office visit: no    Scheduled appointment with PCP within 7-14 days    Follow Up  Future Appointments   Date Time Provider Dariusz Dick   2021  4:10 PM MD THERESE Florez GEN SURG [facility]   2021  2:30 PM Jori Briggs MD [place] CARDIO [facility]   10/4/2021  8:45 AM MD THERESE Dyer ENDO [facility]       Leena

## 2021-07-12 NOTE — DISCHARGE SUMMARY
DISCHARGE SUMMARY  Patient ID:  Joan Oakley  [id number]  63 y.o.  1960    Admit date: 7/8/2021      Discharge date : 7/10/2021      Admission Diagnoses: Myositis [M60.9]  Infective myositis, right upper arm [M60.021]    Discharge Diagnosis  Principal Problem:    Cellulitis  Active Problems:    Type 2 diabetes mellitus without complication, with long-term current use of insulin (HCC)    Essential hypertension    Hepatic steatosis    Hypertriglyceridemia    Diabetic nephropathy associated with type 2 diabetes mellitus (HCC)    Stage 3a chronic kidney disease (HCC)    Infective myositis of right upper extremity    Diabetes mellitus (HCC)    Infective myositis, right upper arm    Grade II diastolic dysfunction  Resolved Problems:    * No resolved hospital problems. *      Hospital Course:     CHIEF COMPLAINT: Cellulitis right upper extremity     History of Present Illness: Patient is a 68-year-old male of Dr. Kari Bettencourt I am asked to admit and follow. History obtained from patient as well as electronic record. I spoke with Dr. Juan Antonio Gomez this morning for further history. Patient is a non-insulin-dependent diabetic; he requested and applied freestyle bon glucose monitor to the right arm. He informs me after freestyle Thiago Lingo was applied, he was doing extremely vigorous hard work including heavy lifting which may have irritated his arm at the site. He was seen in the office 6/28/2021 found to have right arm swollen hot red body aches and chills. He was placed on cephalexin 500 mg 3 times daily. He was advised to remove the freestyle bon patch. On 6/29 symptoms \"worsened\" he presented to the ED. In the ED he was presumed to have an abscess with surrounding cellulitis no evidence of compartment syndrome. Ultrasound confirmed abscess which was then incised and drained with purulent drainage expressed. Cephalexin increased to 4 times daily doxycycline added.   He was seen again at Dr. Leonel Painting office on leukocytosis and procalcitonin of only 0.09. Recommend continuing daptomycin and cefazolin, obtain CK await culture results. SPECT in a day or so with IV antibiotics, abscess will develop which needs to be surgically drained. --Patient started on cefepime and vancomycin in the ED which has now been discontinued. --2020 patient had Holter monitor applied due to complaints of palpitations. Minimal heart rate 63 maximum heart rate 118; 1068 PVCs 2 beats of supraventricular origin. He was placed on the monitor and observation yesterday for further clarification  --1/17/2020 patient underwent surgery for appendectomy found to have a mesenteric hematoma; vascular surgery was consulted no mesenteric surgery needed. --Past medical history is negative for rheumatic fever scarlet fever polio diphtheria cancer. Diabetes present since approximately 2005; hypertension 2005  --Lifelong non-smoker  --Mother and father both alive with diabetes and hypertension  --Patient has a history of heart murmur in the past; 2D echo has been ordered    7/10/2021-SUBJECTIVE: Chiquis Remy is alert awake and cooperative; oriented ×3. Denies any chest pain dyspnea nausea emesis. Tolerating diet. No abdominal pain. His right arm is feeling much better; he would like to be discharged on oral antibiotics if possible. 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 He questions why Jardiance and Metformin were held, I advised him that will be resumed at discharge. Current temperature 97.7. Highest temperature last 24 hours 99.1. Room air, SPO2 96%. Intake and output +240 cc. Most recent glucose 164. ALT AST slightly elevated due to fatty liver. WBC 5.1 hemoglobin 12.3. CRP 0.3; ESR 28. A1c 6.7. TSH 1.370. Blood cultures negative to date. 2D echo completed however results pending.   ID note from today, continue daptomycin and cefazolin IV, monitor labs follow cultures.       Instructions at discharge:    RE  CHANGES AND FINDINGS   Medications reviewed with patient  GI prophylaxis  DVT prophylaxis  Consultants notes reviewed  I messaged ID service regarding potential oral antibiotics and discharge today since patient is primary caregiver of his parents  Patient has appointment Monday, 12/12/2021 with surgery if incision and drainage are needed  Restart Jardiance 25 mg daily at discharge  Restart Metformin 1000 mg twice daily at discharge      Condition at DISCHARGE : Josue Lakhani     Discharged to: Home    Discharge Instructions: Medications reviewed with patient    Consults:ID and general surgery     Past Medical Hx :   Past Medical History:   Diagnosis Date    Anxiety     Appendicitis 01/17/2020    Cellulitis 07/10/2021    Class 1 obesity due to excess calories in adult 12/03/2020    Depression     Diabetes mellitus ([facility] 2005    Diabetic nephropathy associated with type 2 diabetes mellitus ([facility] 12/03/2020    Gastroesophageal reflux disease without esophagitis 12/03/2020    Grade II diastolic dysfunction 76/1094    Hepatic steatosis 12/03/2020    Hyperlipidemia     Hypertension 2005    Infective myositis of right upper extremity 07/08/2021    Kidney stones     X2    Mesenteric hematoma 01/17/2020    Mixed hyperlipidemia 05/13/2021    Proteinuria     Stage 3a chronic kidney disease ([address] 12/03/2020    Type 2 diabetes mellitus without complication ([facility]     Vitamin D deficiency 05/18/2021       Past Surgical Hx :  Past Surgical History:   Procedure Laterality Date    COLONOSCOPY  11/04/2019    Dr. Darwin Medina Tubular adenoma; diverticulosis    LAPAROSCOPIC APPENDECTOMY N/A 1/17/2020    APPENDECTOMY LAPAROSCOPIC, EXPLORATORY LAPAROTOMY performed by [place] Hill MD at [address]:   CBC:   Lab Results   Component Value Date    WBC 5.1 07/10/2021    RBC 4.18 07/10/2021    HGB 12.3 07/10/2021    HCT 37.2 07/10/2021    MCV 89.0 07/10/2021    MCH 29.4 07/10/2021    MCHC 33.1 07/10/2021    RDW 13.2 07/10/2021     07/10/2021    MPV 9.5 07/10/2021     CBC with Differential:    Lab Results   Component Value Date    WBC 5.1 07/10/2021    RBC 4.18 07/10/2021    HGB 12.3 07/10/2021    HCT 37.2 07/10/2021     07/10/2021    MCV 89.0 07/10/2021    MCH 29.4 07/10/2021    MCHC 33.1 07/10/2021    RDW 13.2 07/10/2021    LYMPHOPCT 20.5 07/10/2021    MONOPCT 9.0 07/10/2021    BASOPCT 0.6 07/10/2021    MONOSABS 0.46 07/10/2021    LYMPHSABS 1.05 07/10/2021    EOSABS 0.14 07/10/2021    BASOSABS 0.03 07/10/2021     Hemoglobin/Hematocrit:    Lab Results   Component Value Date    HGB 12.3 07/10/2021    HCT 37.2 07/10/2021     CMP:    Lab Results   Component Value Date     07/10/2021    K 4.0 07/10/2021    K 4.2 07/08/2021     07/10/2021    CO2 25 07/10/2021    BUN 15 07/10/2021    CREATININE 1.0 07/10/2021    GFRAA >60 07/10/2021    LABGLOM >60 07/10/2021    GLUCOSE 164 07/10/2021    PROT 6.7 07/10/2021    LABALBU 4.1 07/10/2021    CALCIUM 9.3 07/10/2021    BILITOT 0.3 07/10/2021    ALKPHOS 49 07/10/2021    AST 52 07/10/2021    ALT 47 07/10/2021     BMP:    Lab Results   Component Value Date     07/10/2021    K 4.0 07/10/2021    K 4.2 07/08/2021     07/10/2021    CO2 25 07/10/2021    BUN 15 07/10/2021    LABALBU 4.1 07/10/2021    CREATININE 1.0 07/10/2021    CALCIUM 9.3 07/10/2021    GFRAA >60 07/10/2021    LABGLOM >60 07/10/2021    GLUCOSE 164 07/10/2021     Hepatic Function Panel:    Lab Results   Component Value Date    ALKPHOS 49 07/10/2021    ALT 47 07/10/2021    AST 52 07/10/2021    PROT 6.7 07/10/2021    BILITOT 0.3 07/10/2021    BILIDIR <0.2 07/10/2021    IBILI see below 07/10/2021    LABALBU 4.1 07/10/2021     Ionized Calcium:  No results found for: IONCA  Magnesium:    Lab Results   Component Value Date    MG 2.0 07/10/2021     Phosphorus:    Lab Results   Component Value Date    PHOS 3.0 07/10/2021     LDH:  No results found for: LDH  Uric Acid:    Lab Results   Component Value Date    LABURIC 5.5 07/09/2021     PT/INR:  No results found for: PROTIME, INR  Warfarin PT/INR:  No components found for: PTPATWAR, PTINRWAR  PTT:  No results found for: APTT, PTT[APTT}  Troponin:  No results found for: TROPONINI  Last 3 Troponin:  No results found for: TROPONINI  U/A:    Lab Results   Component Value Date    COLORU Yellow 11/09/2020    PROTEINU >=300 11/09/2020    PHUR 6.0 11/09/2020    WBCUA NONE 11/09/2020    RBCUA 0-1 11/09/2020    BACTERIA NONE SEEN 11/09/2020    CLARITYU Clear 11/09/2020    SPECGRAV >=1.030 11/09/2020    LEUKOCYTESUR Negative 11/09/2020    UROBILINOGEN 0.2 11/09/2020    BILIRUBINUR Negative 11/09/2020    BLOODU TRACE-INTACT 11/09/2020    GLUCOSEU 500 11/09/2020     HgBA1c:    Lab Results   Component Value Date    LABA1C 6.7 07/09/2021     FLP:    Lab Results   Component Value Date    TRIG 599 07/09/2021    HDL 40 07/09/2021    [address] - 07/09/2021    LABVLDL - 07/09/2021     TSH:    Lab Results   Component Value Date    TSH 1.370 07/09/2021     VITAMIN B12: No components found for: B12  FOLATE:    Lab Results   Component Value Date    FOLATE 10.5 07/09/2021     IRON:  No results found for: IRON  Iron Saturation:  No components found for: PERCENTFE  TIBC:  No results found for: TIBC  FERRITIN:  No results found for: FERRITIN       CBC:  Recent Labs     07/10/21  0718   WBC 5.1   RBC 4.18   HGB 12.3*   HCT 37.2      MCV 89.0   MCH 29.4   MCHC 33.1   RDW 13.2   LYMPHOPCT 20.5   MONOPCT 9.0   BASOPCT 0.6   MONOSABS 0.46   LYMPHSABS 1.05*   EOSABS 0.14   BASOSABS 0.03          H & H :  Recent Labs     07/10/21  0718   HGB 12.3*       TSH:No results for input(s): TSH in the last 72 hours. GLUCOSE:No results for input(s): POCGLU in the last 72 hours.     CMP:  Recent Labs     07/10/21  0718      K 4.0      CO2 25   BUN 15   CREATININE 1.0   GFRAA >60   LABGLOM >60   GLUCOSE 164*   PROT 6.7 LABALBU 4.1   CALCIUM 9.3   BILITOT 0.3   ALKPHOS 49   AST 52*   ALT 47*         BNP:No results found for: BNP    PROTIME/INR:No results for input(s): PROTIME, INR in the last 72 hours. CRP:   Recent Labs     07/10/21  0718   CRP 0.3       ESR:  Recent Labs     07/10/21  0718   SEDRATE 28*       LIPASE , AMYLASE:  Lab Results   Component Value Date    LIPASE 68 (H) 03/16/2020      No results found for: AMYLASE    ABGs: No results found for: PHART, PO2ART, PQY9UMA    CARDIAC: No results for input(s): CKTOTAL, CKMB, CKMBINDEX, TROPONINI in the last 72 hours. Lipid Profile:   Lab Results   Component Value Date    TRIG 599 07/09/2021    HDL 40 07/09/2021    LDLCALC - 07/09/2021    CHOL 148 07/09/2021        Echo Complete    Result Date: 7/11/2021  Transthoracic Echocardiography Report (TTE)  Demographics   Patient Name       Jonnathan Sirsofia  Gender              Male                     G   Medical Record     [id number]        Room Number         6411  Number   Account #          [de-identified]       Procedure Date      07/09/2021   Corporate ID                       Ordering Physician  Helena Vail   Accession Number   [id number]      Referring Physician Molly Mccoy   Date of Birth      1960      Sonographer         Amanda Chiang                                                         [facility]   Age                63 year(s)      Interpreting        Rodrigue Bose DO                                     Physician                                      Any Other  Procedure Type of Study   TTE procedure:Echo Complete W/Doppler & Color Flow. Procedure Date Date: 07/09/2021 Start: 07:29 AM Study Location: Echo Lab Technical Quality: Adequate visualization Indications:Congestive heart failure. Patient Status: Routine Height: 66 inches Weight: 210 pounds BSA: 2.04 m^2 BMI: 33.89 kg/m^2 HR: 71 bpm BP: 128/96 mmHg  Findings   Left Ventricle  Left ventricle grossly normal in size. Normal LV segmental wall motion.   Normal ----------------------------------------------------------------  Electronically signed by Tee MAYInterpreting  physician) on 07/11/2021 03:12 PM  ----------------------------------------------------------------  M-Mode/2D Measurements & Calculations   LV Diastolic   LV Systolic Dimension: 3.3   AV Cusp Separation: 1.4 cmLA  Dimension: 5   cm                           Dimension: 3.9 cmAO Root  cm             LV Volume Diastolic: 285.0   Dimension: 3.1 cm  LV FS:34 %     ml  LV PW          LV Volume Systolic: 43 ml  Diastolic: 0.9 LV EDV/LV EDV Index: 117.1  cm             ml/57 ml/m^2LV ESV/LV ESV    RV Diastolic Dimension: 3.6 cm  LV PW          Index: 43 JY/88RI/ m^2  Systolic: 1.3  EF Calculated: 63.3 %        LA/Aorta: 1.26  cm             LV Mass Index: 72 l/min*m^2  Ascending Aorta: 3.5 cm  Septum                                      LA volume/Index: 44.9 ml  Diastolic: 0.8                              /34.89ml/m^2  cm             LVOT: 2 cm                   RA Area: 14.7 cm^2  Septum  Systolic: 1.3                               IVC Expiration: 1.5 cm  cm  CO: 5.11 l/min  CI: 2.5  l/m*m^2  LV Mass:  146.83 g  Doppler Measurements & Calculations   MV Peak E-Wave:   AV Peak Velocity: 1.63 m/s    LVOT Peak Velocity: 1.11  1.04 m/s          AV Peak Gradient: 10.68 mmHg  m/s  MV Peak A-Wave:   AV Mean Velocity: 1.06 m/s    LVOT Mean Velocity: 0.66  1.21 m/s          AV Mean Gradient: 5.3 mmHg    m/s  MV E/A Ratio:     AV VTI: 31.9 cm               LVOT Peak Gradient: 4.9  0.85              AV Area (Continuity):2.25     mmHgLVOT Mean Gradient:  MV Peak Gradient: cm^2                          2.1 mmHg  5.8 mmHg                                        Estimated RVSP: 27.9 mmHg  MV Mean Gradient: LVOT VTI: 22.9 cm             Estimated RAP:3 mmHg  2.4 mmHg          IVRT: 73.8 msec  MV Mean Velocity: Estimated PASP: 27.86 mmHg  0.7 m/s           Pulm.  Vein A Reversal         TR Velocity:2.49 m/s  MV Deceleration   Duration:96.9 msec            TR Gradient:24.86 mmHg  Time: 189.4 msec  Pulm. Vein D Velocity:0.51    PV Peak Velocity: 1.27 m/s  MV P1/2t: 46.5    m/sPulm. Vein A Reversal      PV Peak Gradient: 6.44  msec              Velocity:0.18 m/s             mmHg  MVA by PHT:4.73   Pulm. Vein S Velocity: 0.64   PV Mean Velocity: 0.88 m/s  cm^2              m/s                           PV Mean Gradient: 3.5 mmHg  MV Area  (continuity): 2.2  cm^2  MV E' Septal  Velocity: 0.06  m/s  MV E' Lateral  Velocity: 10 m/s  [email or web address]? [id number]    CT HUMERUS RIGHT W CONTRAST    Result Date: 7/8/2021  EXAMINATION: CT OF THE RIGHT HUMERUS WITH CONTRAST 7/8/2021 4:36 pm TECHNIQUE: CT of the right humerus was performed with the administration of intravenous contrast.  Multiplanar reformatted images are provided for review. Dose modulation, iterative reconstruction, and/or weight based adjustment of the mA/kV was utilized to reduce the radiation dose to as low as reasonably achievable. COMPARISON: None. HISTORY ORDERING SYSTEM PROVIDED HISTORY: large abscess, no improvement abx x 2 TECHNOLOGIST PROVIDED HISTORY: Reason for exam:->large abscess, no improvement abx x 2 Decision Support Exception - unselect if not a suspected or confirmed emergency medical condition->Emergency Medical Condition (MA) FINDINGS: There is increased attenuation and stranding of the subcutaneous fat extending from the level of the elbow dorsally to the level of the mid humerus, consistent with cellulitis. There is indistinctness and contour irregularity of the underlying muscular planes, which may represent myositis. There are no organized circumscribed peripherally enhancing fluid collections to suggest drainable abscess. There are no underlying erosive or lytic bony changes. There is no evidence of soft tissue gas.      Findings consistent with cellulitis involving the subcutaneous soft tissues extending from the level of the elbow dorsally to the level of the mid humerus. Indistinctness and irregularity of the underlying muscular planes may represent associated myositis. No evidence of drainable abscess or underlying bony changes.        Discharge Exam:  See progress note from today    Discharge Medication List as of 7/10/2021  3:21 PM      START taking these medications    Details   doxycycline hyclate (VIBRAMYCIN) 100 MG capsule Take 1 capsule by mouth 2 times daily for 7 days, Disp-14 capsule, R-0Print      cephALEXin (KEFLEX) 500 MG capsule Take 2 capsules by mouth every 6 hours for 7 days, Disp-56 capsule, R-0Print         CONTINUE these medications which have NOT CHANGED    Details   doxycycline hyclate (VIBRA-TABS) 100 MG tablet Take 1 tablet by mouth 2 times daily for 7 days, Disp-14 tablet, R-0Normal      tamsulosin (FLOMAX) 0.4 MG capsule Take 0.4 mg by mouth dailyHistorical Med      amLODIPine (NORVASC) 10 MG tablet Take 10 mg by mouth dailyHistorical Med      empagliflozin (JARDIANCE) 25 MG tablet Take 25 mg by mouth daily, Disp-30 tablet, R-3Normal      metFORMIN (GLUCOPHAGE) 1000 MG tablet Take 1,000 mg by mouth 2 times daily (with meals)Historical Med      potassium citrate (UROCIT-K) 10 MEQ (1080 MG) extended release tablet Take 10 mEq by mouth 2 times daily (with meals)Historical Med      allopurinol (ZYLOPRIM) 100 MG tablet Take 100 mg by mouth dailyHistorical Med      losartan (COZAAR) 100 MG tablet Take 1 tablet by mouth daily, Disp-90 tablet, R-3Normal      sertraline (ZOLOFT) 100 MG tablet Take 1 tablet by mouth daily, Disp-90 tablet,R-3Normal      glyBURIDE (DIABETA) 5 MG tablet Take 1 tablet by mouth 2 times daily, Disp-180 tablet,R-3Normal      fenofibrate micronized (LOFIBRA) 134 MG capsule Take 1 capsule by mouth every morning (before breakfast), Disp-90 capsule,R-3Normal      metoprolol succinate (TOPROL XL) 50 MG extended release tablet Take 1 tablet by mouth 2 times daily, Disp-180 tablet,R-3Normal      pravastatin (PRAVACHOL) 40 MG tablet Take 1 tablet by mouth daily, Disp-90 tablet,R-3Normal      aspirin 81 MG tablet Take 81 mg by mouth dailyHistorical Med         STOP taking these medications       Continuous Blood Gluc Sensor (FREESTYLE ROCIO 2 SENSOR) [facility] Comments:   Reason for Stopping:               Time Spent on discharge is more than 30 minutes --      TIME  INCLUDES TIME THAT WAS  SPENT WITH DISCHARGE PAPERS, MEDICATION REVIEW, MEDICATION RECONCILIATION,   PRESCRIPTIONS, CHART REVIEW, PATIENT EXAM , FINAL PROGRESS NOTE, DISCUSSION OF FINDINGS WITH PATIENT AND AVAILABLE FAMILY , AND DICTATION  WHERE NEEDED ; Home Health Care [facility]) FORMS COMPLETED ; N-17  COMPLETION ;  H&P UPDATED ; DURABLE EQUIPMENT FORMS.      Active Hospital Problems    Diagnosis     Grade II diastolic dysfunction [L42.5]     Cellulitis [L03.90]     Infective myositis of right upper extremity [M60.000]     Infective myositis, right upper arm [M60.021]     Diabetes mellitus ([address] [E11.9]     Stage 3a chronic kidney disease ([address] [N18.31]     Diabetic nephropathy associated with type 2 diabetes mellitus ([address] [E11.21]     Hepatic steatosis [K76.0]     Hypertriglyceridemia [E78.1]     Type 2 diabetes mellitus without complication, with long-term current use of insulin ([address] [E11.9, Z79.4]     Essential hypertension [I10]        Signed:    Kit Conrad DO DO    7/12/2021    1:31 PM    Voice recognition software use for dictation

## 2021-07-12 NOTE — PROGRESS NOTES
Office Procedure Note    Juana Ramirez     DATE OF PROCEDURE: 7/12/2021  SURGEON: Dr. Suraj Le MD, M.D. PREOPERATIVE DIAGNOSIS: Abscess of the right arm     POSTOPERATIVE DIAGNOSIS: Same    PROCEDURE: Incision and drainage of Abscess of the  right arm    ANESTHESIA: 1% Lidocaine for local anesthesia. COMPLICATIONS: None. SPECIMEN: Aerobic and anaerobic C&S of the abscess drainage. PROCEDURE: The right arm was prepped with Chloraprep and draped in a sterile fashion. 1% Lidocaine was infiltrated into the skin and subcutaneous tissue over the abscess and then a Crutiate incision was made for the entire diameter of the abscess cavity. A small amount of purulent material was expressed from the wound. All loculations were broken with a hemostat. The wound was then packed with 1/4 inch Nu-gauze packing up to the skin level. It was then covered with a dry sterile dressing. The patient tolerated the procedure well. Jyotsna Hinds MD 7/12/2021 5:28 PM     Send copy of H&P to PCP, Anita Cortez MD and referring physician, No ref.  provider found

## 2021-07-13 LAB
BLOOD CULTURE, ROUTINE: NORMAL
CULTURE, BLOOD 2: NORMAL

## 2021-07-21 ENCOUNTER — OFFICE VISIT (OUTPATIENT)
Dept: CARDIOLOGY CLINIC | Age: 61
End: 2021-07-21
Payer: COMMERCIAL

## 2021-07-21 ENCOUNTER — NURSE ONLY (OUTPATIENT)
Dept: CARDIOLOGY CLINIC | Age: 61
End: 2021-07-21

## 2021-07-21 ENCOUNTER — TELEPHONE (OUTPATIENT)
Dept: SURGERY | Age: 61
End: 2021-07-21

## 2021-07-21 VITALS
RESPIRATION RATE: 16 BRPM | SYSTOLIC BLOOD PRESSURE: 128 MMHG | BODY MASS INDEX: 33.85 KG/M2 | HEIGHT: 66 IN | WEIGHT: 210.6 LBS | DIASTOLIC BLOOD PRESSURE: 70 MMHG | HEART RATE: 82 BPM

## 2021-07-21 DIAGNOSIS — E11.8 DM (DIABETES MELLITUS), TYPE 2 WITH COMPLICATIONS (HCC): ICD-10-CM

## 2021-07-21 DIAGNOSIS — L03.113 CELLULITIS OF RIGHT UPPER EXTREMITY: ICD-10-CM

## 2021-07-21 DIAGNOSIS — N20.0 KIDNEY STONES: ICD-10-CM

## 2021-07-21 DIAGNOSIS — R80.9 PROTEINURIA, UNSPECIFIED TYPE: ICD-10-CM

## 2021-07-21 DIAGNOSIS — F32.A ANXIETY AND DEPRESSION: ICD-10-CM

## 2021-07-21 DIAGNOSIS — R07.9 CHEST PAIN, UNSPECIFIED TYPE: ICD-10-CM

## 2021-07-21 DIAGNOSIS — R00.2 PALPITATIONS: Primary | ICD-10-CM

## 2021-07-21 DIAGNOSIS — E11.21 DIABETIC NEPHROPATHY ASSOCIATED WITH TYPE 2 DIABETES MELLITUS (HCC): ICD-10-CM

## 2021-07-21 DIAGNOSIS — E78.2 MIXED HYPERLIPIDEMIA: ICD-10-CM

## 2021-07-21 DIAGNOSIS — F41.9 ANXIETY AND DEPRESSION: ICD-10-CM

## 2021-07-21 DIAGNOSIS — I10 ESSENTIAL HYPERTENSION: ICD-10-CM

## 2021-07-21 DIAGNOSIS — E78.1 HYPERTRIGLYCERIDEMIA: ICD-10-CM

## 2021-07-21 DIAGNOSIS — N18.2 CKD (CHRONIC KIDNEY DISEASE) STAGE 2, GFR 60-89 ML/MIN: ICD-10-CM

## 2021-07-21 PROCEDURE — 93000 ELECTROCARDIOGRAM COMPLETE: CPT | Performed by: INTERNAL MEDICINE

## 2021-07-21 PROCEDURE — 99204 OFFICE O/P NEW MOD 45 MIN: CPT | Performed by: INTERNAL MEDICINE

## 2021-07-21 NOTE — TELEPHONE ENCOUNTER
Patient called in regarding his right arm. Had an I and D on 7/12 and patient thinks he needs more antibiotics. It is a little red,swollen, and tender. He says it is better but he doesn't want it to get worse again. Per Dr Valadez Bodily advised he call infectious disease for more antibiotics but she would see him in office to evaluate and if needs drained. Left message for patient to call office and to see if he wants to schedule.

## 2021-07-21 NOTE — PROGRESS NOTES
30 day [employer] event monitor applied per Dr Rachid Lind. ([id number]). Pt verbalized understanding.

## 2021-07-22 ENCOUNTER — OFFICE VISIT (OUTPATIENT)
Dept: PRIMARY CARE CLINIC | Age: 61
End: 2021-07-22
Payer: COMMERCIAL

## 2021-07-22 ENCOUNTER — TELEPHONE (OUTPATIENT)
Dept: CARDIOLOGY | Age: 61
End: 2021-07-22

## 2021-07-22 VITALS
RESPIRATION RATE: 18 BRPM | TEMPERATURE: 97.5 F | HEIGHT: 66 IN | OXYGEN SATURATION: 96 % | WEIGHT: 210 LBS | HEART RATE: 87 BPM | BODY MASS INDEX: 33.75 KG/M2 | SYSTOLIC BLOOD PRESSURE: 120 MMHG | DIASTOLIC BLOOD PRESSURE: 82 MMHG

## 2021-07-22 DIAGNOSIS — L03.113 CELLULITIS OF RIGHT UPPER EXTREMITY: ICD-10-CM

## 2021-07-22 DIAGNOSIS — E11.21 TYPE 2 DIABETES MELLITUS WITH DIABETIC NEPHROPATHY, WITHOUT LONG-TERM CURRENT USE OF INSULIN (HCC): ICD-10-CM

## 2021-07-22 DIAGNOSIS — L02.413 ABSCESS OF RIGHT ARM: Primary | ICD-10-CM

## 2021-07-22 DIAGNOSIS — M60.000: ICD-10-CM

## 2021-07-22 PROBLEM — L03.90 CELLULITIS: Status: RESOLVED | Noted: 2021-07-10 | Resolved: 2021-07-22

## 2021-07-22 PROBLEM — F32.A ANXIETY AND DEPRESSION: Status: RESOLVED | Noted: 2020-12-03 | Resolved: 2021-07-22

## 2021-07-22 PROBLEM — F41.9 ANXIETY AND DEPRESSION: Status: RESOLVED | Noted: 2020-12-03 | Resolved: 2021-07-22

## 2021-07-22 PROBLEM — M60.021: Status: RESOLVED | Noted: 2021-07-08 | Resolved: 2021-07-22

## 2021-07-22 PROBLEM — N18.31 STAGE 3A CHRONIC KIDNEY DISEASE (HCC): Status: RESOLVED | Noted: 2020-12-03 | Resolved: 2021-07-22

## 2021-07-22 PROBLEM — E78.1 HYPERTRIGLYCERIDEMIA: Status: RESOLVED | Noted: 2020-12-03 | Resolved: 2021-07-22

## 2021-07-22 PROCEDURE — 1111F DSCHRG MED/CURRENT MED MERGE: CPT | Performed by: INTERNAL MEDICINE

## 2021-07-22 PROCEDURE — 99495 TRANSJ CARE MGMT MOD F2F 14D: CPT | Performed by: INTERNAL MEDICINE

## 2021-07-22 NOTE — PROGRESS NOTES
OFFICE VISIT        PRIMARY CARE PHYSICIAN:    Cherry Reynolds MD       ALLERGIES / SENSITIVITIES:    No Known Allergies       REVIEWED MEDICATIONS:      Current Outpatient Medications:     tamsulosin (FLOMAX) 0.4 MG capsule, Take 0.4 mg by mouth daily, Disp: , Rfl:     amLODIPine (NORVASC) 10 MG tablet, Take 10 mg by mouth daily, Disp: , Rfl:     empagliflozin (JARDIANCE) 25 MG tablet, Take 25 mg by mouth daily, Disp: 30 tablet, Rfl: 3    metFORMIN (GLUCOPHAGE) 1000 MG tablet, Take 1,000 mg by mouth 2 times daily (with meals), Disp: , Rfl:     potassium citrate (UROCIT-K) 10 MEQ (1080 MG) extended release tablet, Take 10 mEq by mouth 2 times daily (with meals), Disp: , Rfl:     allopurinol (ZYLOPRIM) 100 MG tablet, Take 100 mg by mouth daily, Disp: , Rfl:     losartan (COZAAR) 100 MG tablet, Take 1 tablet by mouth daily, Disp: 90 tablet, Rfl: 3    sertraline (ZOLOFT) 100 MG tablet, Take 1 tablet by mouth daily, Disp: 90 tablet, Rfl: 3    glyBURIDE (DIABETA) 5 MG tablet, Take 1 tablet by mouth 2 times daily, Disp: 180 tablet, Rfl: 3    fenofibrate micronized (LOFIBRA) 134 MG capsule, Take 1 capsule by mouth every morning (before breakfast), Disp: 90 capsule, Rfl: 3    metoprolol succinate (TOPROL XL) 50 MG extended release tablet, Take 1 tablet by mouth 2 times daily, Disp: 180 tablet, Rfl: 3    pravastatin (PRAVACHOL) 40 MG tablet, Take 1 tablet by mouth daily, Disp: 90 tablet, Rfl: 3    aspirin 81 MG tablet, Take 81 mg by mouth daily, Disp: , Rfl:       S: REASON FOR VISIT:    Palpitations and chest heaviness. Opal Conroy is a pleasant, 51-year-old male with multiple risk factors for coronary artery disease (see below). He has been experiencing occasional palpitations for more than 20 years. He reports that lately, the feeling has, at times, been intense:  He describes the palpitations as a sinking sensation.   Two days ago and for two days in a row, he experienced chest discomfort which is poorly characterized. He otherwise denies exertional chest pain or significant exertional dyspnea. He denies orthopnea, PND's or lower extremity swelling. He denies presyncope or syncope. REVIEW OF SYSTEMS:    CONSTITUTIONAL:  Denies fevers, chills, night sweats or fatigue. HEENT:  Denies any unusual headaches. Denies recent changes in hearing or vision. Denies dysphagia, hoarseness, hemoptysis, hematemesis or epistaxis. ENDOCRINE:  Denies polyphagia, polydipsia or polyuria. Denies heat or cold intolerance. MUSCULOSKELETAL:  Denies any significant arthralgias or myalgias. SKIN:  He has a recent right arm infection on which he has a dressing at the present time. He denies any other skin rashes, ulcers of itching. HEME/LYMPH:  Denies any palpable lymph nodes, bleeding or easy bruisability. HEART:  As above. LUNGS:  Denies cough or sputum production. GI: Denies abdominal pain, nausea, vomiting, diarrhea, constipation, rectal bleeding or tarry stools. :  Denies hematuria or dysuria. PSYCHIATRIC:  He has a history of anxiety and depression, but denies any recent mood changes. NEUROLOGIC:  Denies memory loss, motor weakness, numbness, tingling or tremors. PAST MEDICAL/SURGICAL HISTORY:    1. History of colonoscopy with polypectomy (tubular adenoma) in 11/2019.    2.  History of acute appendicitis, S/P laparoscopic appendectomy followed by open exploratory on 1/17/2020.    3.  Right arm infection treated with IV oral antibiotics, 7/2021.    4.  History of anxiety and depression. 5.  History of GERD. 6.  History of vitamin D deficiency. 7.  Fatty liver with elevated hepatocellular enzymes. 8.  Obesity. 9.  Hypertension. 10.  Diabetes mellitus with proteinuria and nephropathy. 11. Chronic kidney disease, Stage 1. 12. Mixed hyperlipidemia. 13. History of kidney stones.    14. Echocardiogram done on 7/9/2021 was read as showing left ventricular size and wall thickness with normal LV segmental wall motion with Stage 2 diastolic dysfunction. Mildly dilated right ventricle with normal right ventricular systolic function. Borderline dilated left atrium. No significant valvular abnormalities reported. Normal right ventricular systolic pressure. FAMILY HISTORY:  Father living in his late [de-identified] to early 80's is S/P aortic valve replacement surgery. Mother living at age 80. Has aortic stenosis. SOCIAL HISTORY:  Patient does not smoke. He denies alcohol or illicit drug use. O:  COMPLETE PHYSICAL EXAM:      /70   Pulse 82   Resp 16   Ht 5' 6\" (1.676 m)   Wt 210 lb 9.6 oz (95.5 kg)   BMI 33.99 kg/m²      General:  Well-developed, well-nourished, obese male in no distress. HEENT: Normocephalic and atraumatic head. No JVD. No carotid bruits. Carotid upstrokes normal bilaterally. No thyromegaly. Sclerae not icteric. No xanthelasmas. Mucous membranes moist.  Chest:  Symmetrical and nontender. No deformities. Lungs:  Clear to auscultation bilaterally. Heart:  Normal S1 and S2. No S3 or S4. Grade 1/6 systolic murmur heard at the apex. Abdomen: Soft, nontender without organomegaly or masses. No bruits. Normal bowel sounds. Extremities: No edema. Pulses palpable. Skin:  Normal turgor. No rashes or ulcers noted. Neurologic: Oriented x3. No motor or sensory deficits detected. REVIEW OF DIAGNOSTIC TESTS:    1. Blood tests from 7/9/2021 reviewed: Total cholesterol 148, triglycerides 599, HDL 40. Hemoglobin A1C 6.7.    2.  Blood tests from 7/102/2020 reviewed:  BUN 15, creatinine 1.0, potassium 4.0, GFR > 60. ALT 47, AST 52.    3.  Pro BNP from 7/9/2021 = 28.   4.  CBC from 7/9/2021 unremarkable. 5.  EKG done today showed sinus rhythm with RSR' in V1 with poor R wave progression in V1 to V3.         ASSESSMENT / DIAGNOSIS:   1.  Palpitations. 2.  Chest discomfort. 3.  Hypertension, controlled. 4.  Mixed hyperlipidemia:  On statin therapy.   5.

## 2021-07-23 ENCOUNTER — TELEPHONE (OUTPATIENT)
Dept: CARDIOLOGY | Age: 61
End: 2021-07-23

## 2021-08-04 ENCOUNTER — TELEPHONE (OUTPATIENT)
Dept: CARDIOLOGY | Age: 61
End: 2021-08-04

## 2021-08-13 ENCOUNTER — TELEPHONE (OUTPATIENT)
Dept: CARDIOLOGY | Age: 61
End: 2021-08-13

## 2021-08-13 NOTE — TELEPHONE ENCOUNTER
3RD CALL MADE 8/4/21 TO SCHEDULE STRESS. PATIENT HAS NEVER RETURNED ANY CALLS. THANK YOU.   Electronically signed by Hank Nair on 8/13/2021 at 2:51 PM

## 2021-08-19 RX ORDER — PRAVASTATIN SODIUM 40 MG
40 TABLET ORAL DAILY
Qty: 90 TABLET | Refills: 3 | Status: SHIPPED
Start: 2021-08-19 | End: 2021-09-16 | Stop reason: SDUPTHER

## 2021-08-25 DIAGNOSIS — R00.2 PALPITATIONS: ICD-10-CM

## 2021-08-25 DIAGNOSIS — R00.2 PALPITATIONS: Primary | ICD-10-CM

## 2021-09-14 ENCOUNTER — TELEPHONE (OUTPATIENT)
Dept: PRIMARY CARE CLINIC | Age: 61
End: 2021-09-14

## 2021-09-14 NOTE — TELEPHONE ENCOUNTER
Pt calling he has a fever and body aches, he is advised [facility] for eval and covid test

## 2021-09-16 RX ORDER — ALLOPURINOL 100 MG/1
100 TABLET ORAL DAILY
Qty: 90 TABLET | Refills: 3 | Status: SHIPPED | OUTPATIENT
Start: 2021-09-16

## 2021-09-16 RX ORDER — PRAVASTATIN SODIUM 40 MG
40 TABLET ORAL DAILY
Qty: 90 TABLET | Refills: 3 | Status: SHIPPED | OUTPATIENT
Start: 2021-09-16

## 2021-09-16 NOTE — TELEPHONE ENCOUNTER
----- Message from Jonathan Almonte sent at 9/16/2021 12:59 PM EDT -----  Subject: Refill Request    QUESTIONS  Name of Medication? pravastatin (PRAVACHOL) 40 MG tablet  Patient-reported dosage and instructions? 40 mg x 1 daily   How many days do you have left? 7  Preferred Pharmacy? [id number]  Pharmacy phone number (if available)? [phone]  ---------------------------------------------------------------------------  --------------  CALL BACK INFO  What is the best way for the office to contact you? OK to leave message on   voicemail, OK to respond with electronic message via [employer] portal (only   for patients who have registered [employer] account)  Preferred Call Back Phone Number?  [phone]

## 2021-09-16 NOTE — TELEPHONE ENCOUNTER
----- Message from Kelly Brooks sent at 9/16/2021 12:56 PM EDT -----  Subject: Refill Request    QUESTIONS  Name of Medication? allopurinol (ZYLOPRIM) 100 MG tablet  Patient-reported dosage and instructions? 100 mg x 1 daily   How many days do you have left? 0  Preferred Pharmacy? [id number]  Pharmacy phone number (if available)? [phone]  ---------------------------------------------------------------------------  --------------  CALL BACK INFO  What is the best way for the office to contact you? OK to leave message on   voicemail, OK to respond with electronic message via [email or web address] portal (only   for patients who have registered [email or web address] account)  Preferred Call Back Phone Number?  [phone]

## 2021-09-17 DIAGNOSIS — E11.9 TYPE 2 DIABETES MELLITUS WITHOUT COMPLICATION, WITH LONG-TERM CURRENT USE OF INSULIN (HCC): ICD-10-CM

## 2021-09-17 DIAGNOSIS — Z79.4 TYPE 2 DIABETES MELLITUS WITHOUT COMPLICATION, WITH LONG-TERM CURRENT USE OF INSULIN (HCC): ICD-10-CM

## 2021-09-17 RX ORDER — EMPAGLIFLOZIN 25 MG/1
TABLET, FILM COATED ORAL
Qty: 30 TABLET | Refills: 5 | Status: SHIPPED
Start: 2021-09-17 | End: 2022-04-12 | Stop reason: SDUPTHER

## 2021-09-22 ENCOUNTER — TELEPHONE (OUTPATIENT)
Dept: PRIMARY CARE CLINIC | Age: 61
End: 2021-09-22

## 2021-09-22 DIAGNOSIS — J06.9 VIRAL UPPER RESPIRATORY TRACT INFECTION: Primary | ICD-10-CM

## 2021-09-22 DIAGNOSIS — R11.0 NAUSEA: ICD-10-CM

## 2021-09-22 RX ORDER — ONDANSETRON 4 MG/1
4 TABLET, FILM COATED ORAL 3 TIMES DAILY PRN
Qty: 15 TABLET | Refills: 0 | Status: SHIPPED
Start: 2021-09-22 | End: 2021-10-18

## 2021-09-22 RX ORDER — BENZONATATE 200 MG/1
200 CAPSULE ORAL 3 TIMES DAILY PRN
Qty: 30 CAPSULE | Refills: 0 | Status: SHIPPED | OUTPATIENT
Start: 2021-09-22 | End: 2021-09-29

## 2021-09-22 NOTE — TELEPHONE ENCOUNTER
Pt calling he has covid, c/o coughing and nausea can you send rx to pharmacy to help, [facility] [address]

## 2021-09-23 ENCOUNTER — TELEPHONE (OUTPATIENT)
Dept: PRIMARY CARE CLINIC | Age: 61
End: 2021-09-23

## 2021-09-23 DIAGNOSIS — J06.9 VIRAL UPPER RESPIRATORY TRACT INFECTION: Primary | ICD-10-CM

## 2021-09-23 RX ORDER — AZITHROMYCIN 250 MG/1
250 TABLET, FILM COATED ORAL SEE ADMIN INSTRUCTIONS
Qty: 6 TABLET | Refills: 0 | Status: SHIPPED | OUTPATIENT
Start: 2021-09-23 | End: 2021-09-28

## 2021-09-23 RX ORDER — GUAIFENESIN AND CODEINE PHOSPHATE 100; 10 MG/5ML; MG/5ML
5 SOLUTION ORAL EVERY 4 HOURS PRN
Qty: 236 ML | Refills: 0 | Status: SHIPPED | OUTPATIENT
Start: 2021-09-23 | End: 2021-09-26

## 2021-10-04 RX ORDER — POTASSIUM CITRATE 10 MEQ/1
10 TABLET, EXTENDED RELEASE ORAL 2 TIMES DAILY WITH MEALS
Qty: 60 TABLET | Refills: 3 | Status: SHIPPED | OUTPATIENT
Start: 2021-10-04

## 2021-10-04 NOTE — TELEPHONE ENCOUNTER
----- Message from Fate Pryor, [place] sent at 10/1/2021 12:55 PM EDT -----  Subject: Refill Request    QUESTIONS  Name of Medication? potassium citrate (UROCIT-K) 10 MEQ (1080 MG) extended   release tablet  Patient-reported dosage and instructions? 10 MEQ, Take twice daily  How many days do you have left? 0  Preferred Pharmacy? [address] phone number (if available)? [phone]  Additional Information for Provider? Pt would like a call back confirming   that the RX has been sent to his pharmacy  ---------------------------------------------------------------------------  --------------  [address]  What is the best way for the office to contact you? OK to leave message on   voicemail  Preferred Call Back Phone Number?  [phone]

## 2021-10-06 ENCOUNTER — TELEPHONE (OUTPATIENT)
Dept: CARDIOLOGY | Age: 61
End: 2021-10-06

## 2021-10-06 NOTE — TELEPHONE ENCOUNTER
Received a voice message from Saurabh Smith. He stated that he was cancelling his apt. That is scheduled for Friday. He is still recovering from Covid and does not feel like he is able to tolerate the stress test right now. Toby John Is aware.

## 2021-10-06 NOTE — TELEPHONE ENCOUNTER
Attempt to reach the patient was unsuccessful. Left a VM to return call so instructions can be given regarding his stress test and to see how he is doing. Phone number provided. Will call back if I do not hear from him.

## 2021-10-06 NOTE — TELEPHONE ENCOUNTER
After an unsuccessful attempt to reach Huntley again, I contacted his brother Amy Grider to see if he would be able to get in touch with his brother. I gave my name and provided the phone number for him to have his brother return my call. He said he would.

## 2021-10-18 ENCOUNTER — OFFICE VISIT (OUTPATIENT)
Dept: PRIMARY CARE CLINIC | Age: 61
End: 2021-10-18
Payer: COMMERCIAL

## 2021-10-18 VITALS
OXYGEN SATURATION: 97 % | HEART RATE: 86 BPM | WEIGHT: 205 LBS | TEMPERATURE: 97.8 F | RESPIRATION RATE: 18 BRPM | DIASTOLIC BLOOD PRESSURE: 82 MMHG | SYSTOLIC BLOOD PRESSURE: 138 MMHG | BODY MASS INDEX: 32.95 KG/M2 | HEIGHT: 66 IN

## 2021-10-18 DIAGNOSIS — K21.9 GASTROESOPHAGEAL REFLUX DISEASE WITHOUT ESOPHAGITIS: ICD-10-CM

## 2021-10-18 DIAGNOSIS — I10 ESSENTIAL HYPERTENSION: ICD-10-CM

## 2021-10-18 DIAGNOSIS — Z87.442 HISTORY OF KIDNEY STONES: ICD-10-CM

## 2021-10-18 DIAGNOSIS — E11.9 TYPE 2 DIABETES MELLITUS WITHOUT COMPLICATION, WITH LONG-TERM CURRENT USE OF INSULIN (HCC): Primary | ICD-10-CM

## 2021-10-18 DIAGNOSIS — Z12.5 PROSTATE CANCER SCREENING: ICD-10-CM

## 2021-10-18 DIAGNOSIS — E83.52 HYPERCALCEMIA: ICD-10-CM

## 2021-10-18 DIAGNOSIS — K76.0 HEPATIC STEATOSIS: ICD-10-CM

## 2021-10-18 DIAGNOSIS — E78.2 MIXED HYPERLIPIDEMIA: ICD-10-CM

## 2021-10-18 DIAGNOSIS — N18.31 STAGE 3A CHRONIC KIDNEY DISEASE (HCC): ICD-10-CM

## 2021-10-18 DIAGNOSIS — Z79.4 TYPE 2 DIABETES MELLITUS WITHOUT COMPLICATION, WITH LONG-TERM CURRENT USE OF INSULIN (HCC): Primary | ICD-10-CM

## 2021-10-18 LAB
CHP ED QC CHECK: NORMAL
GLUCOSE BLD-MCNC: NORMAL MG/DL

## 2021-10-18 PROCEDURE — 82962 GLUCOSE BLOOD TEST: CPT | Performed by: INTERNAL MEDICINE

## 2021-10-18 PROCEDURE — 99214 OFFICE O/P EST MOD 30 MIN: CPT | Performed by: INTERNAL MEDICINE

## 2021-10-18 NOTE — PROGRESS NOTES
[facility]  10/18/21     Chief Complaint   Patient presents with    Positive For Covid-19     follow up         No Known Allergies     Current Outpatient Medications   Medication Sig Dispense Refill    potassium citrate (UROCIT-K) 10 MEQ (1080 MG) extended release tablet Take 1 tablet by mouth 2 times daily (with meals) 60 tablet 3    JARDIANCE 25 MG tablet TAKE 1 TABLET BY MOUTH DAILY 30 tablet 5    pravastatin (PRAVACHOL) 40 MG tablet Take 1 tablet by mouth daily 90 tablet 3    allopurinol (ZYLOPRIM) 100 MG tablet Take 1 tablet by mouth daily 90 tablet 3    tamsulosin (FLOMAX) 0.4 MG capsule Take 0.4 mg by mouth daily      amLODIPine (NORVASC) 10 MG tablet Take 10 mg by mouth daily      metFORMIN (GLUCOPHAGE) 1000 MG tablet Take 1,000 mg by mouth 2 times daily (with meals)      losartan (COZAAR) 100 MG tablet Take 1 tablet by mouth daily 90 tablet 3    sertraline (ZOLOFT) 100 MG tablet Take 1 tablet by mouth daily 90 tablet 3    glyBURIDE (DIABETA) 5 MG tablet Take 1 tablet by mouth 2 times daily 180 tablet 3    fenofibrate micronized (LOFIBRA) 134 MG capsule Take 1 capsule by mouth every morning (before breakfast) 90 capsule 3    metoprolol succinate (TOPROL XL) 50 MG extended release tablet Take 1 tablet by mouth 2 times daily 180 tablet 3    aspirin 81 MG tablet Take 81 mg by mouth daily       No current facility-administered medications for this visit. HPI: Patient comes in for follow-up visit. He recently had COVID-19 illness and was quite ill for several days but remained at home and did not require hospitalization. He never developed any chest congestion or shortness of breath. Mainly he was weak and fatigued with body aches. He is overdue for follow-ups with his nephrologist, endocrinologist, and cardiologist.  He is currently taking Flomax 0.4 mg nightly for his symptomatic BPH with nocturia which did improve with the Flomax.   Recently he was just taking it when he had kidney stones. He states he tries to follow a low refined carbohydrate heart healthy diet but does not exercise on a regular basis. He remains on all of his current meds and supplements the same as listed on his med list, which was reviewed with him. Currently denies any chest pain or shortness of breath. Pressures his blood sugars at home and they have been under fairly good control. He is due for routine labs. Review of Systems: as per HPI      Physical Exam:    Patient is a 63 y.o. male. Patient appears to be in no distress. Breathing comfortably. Ambulates without assistance. HEENT: normal.  Neck supple, no adenopathy or bruits. Heart RR, no MGR. Lungs clear. Abd: normal  Ext: no edema. Peripheral pulses: normal.  No neurologic deficits noted. Lab Results   Component Value Date    WBC 5.1 07/10/2021    HGB 12.3 (L) 07/10/2021    HCT 37.2 07/10/2021     07/10/2021    CHOL 148 07/09/2021    TRIG 599 (H) 07/09/2021    HDL 40 07/09/2021    ALT 47 (H) 07/10/2021    AST 52 (H) 07/10/2021    TSH 1.370 07/09/2021    PSA 1.02 04/08/2021    LABA1C 6.7 (H) 07/09/2021    LABMICR 309.3 (H) 06/14/2021      Lab Results   Component Value Date     07/10/2021    K 4.0 07/10/2021     07/10/2021    CO2 25 07/10/2021    BUN 15 07/10/2021    CREATININE 1.0 07/10/2021    GLUCOSE 164 (H) 07/10/2021    CALCIUM 9.3 07/10/2021    PROT 6.7 07/10/2021    LABALBU 4.1 07/10/2021    BILITOT 0.3 07/10/2021    ALKPHOS 49 07/10/2021    AST 52 (H) 07/10/2021    ALT 47 (H) 07/10/2021    LABGLOM >60 07/10/2021    GFRAA >60 07/10/2021            Assessment:    Type 2 diabetes mellitus without complication, with long-term current use of insulin (HCC) fair control based on blood sugars at home and most recent hemoglobin A1c 6.7%. -     POCT Glucose  -     Hemoglobin A1C; Future    Essential hypertension, fair control on current meds. -     Comprehensive Metabolic Panel;  Future  -     TSH without Reflex; Future    Stage 3a chronic kidney disease ([facility] [address] stable and followed by his nephrologist.  -     Comprehensive Metabolic Panel; Future  -     CBC; Future    Mixed hyperlipidemia  -     Lipid Panel; Future    Prostate cancer screening  -     PSA screening; Future    Hepatic steatosis by history. History of kidney stones, stable with no recent episodes. Hypercalcemia, followed by his nephrologist.    Recovered from recent COVID-19 illness. Discussion Notes: He will continue all of his usual meds and supplements the same as listed on his med list.  He is encouraged to try to follow a low-cholesterol, low refined carbohydrate, low-sodium, heart healthy diet and start exercising on a regular basis. He will follow-up with his cardiologist, endocrinologist, and nephrologist as per their instructions. We will get routine labs including a CMP, hemoglobin A1c, lipid panel, CBC, TSH, and will make further recommendations depending on results.

## 2021-10-19 PROBLEM — E83.52 HYPERCALCEMIA: Status: ACTIVE | Noted: 2021-10-19

## 2021-10-19 PROBLEM — K21.9 GASTROESOPHAGEAL REFLUX DISEASE WITHOUT ESOPHAGITIS: Status: RESOLVED | Noted: 2020-12-03 | Resolved: 2021-10-19

## 2021-10-19 PROBLEM — Z87.442 HISTORY OF KIDNEY STONES: Status: ACTIVE | Noted: 2021-10-19

## 2021-10-19 PROBLEM — M60.000: Status: RESOLVED | Noted: 2021-07-08 | Resolved: 2021-10-19

## 2021-10-25 DIAGNOSIS — E78.1 HYPERTRIGLYCERIDEMIA: ICD-10-CM

## 2021-10-25 RX ORDER — FENOFIBRATE 134 MG/1
134 CAPSULE ORAL
Qty: 90 CAPSULE | Refills: 3 | Status: SHIPPED
Start: 2021-10-25 | End: 2021-10-25 | Stop reason: SDUPTHER

## 2021-10-25 RX ORDER — FENOFIBRATE 134 MG/1
134 CAPSULE ORAL
Qty: 90 CAPSULE | Refills: 3 | Status: SHIPPED | OUTPATIENT
Start: 2021-10-25

## 2021-11-04 RX ORDER — TAMSULOSIN HYDROCHLORIDE 0.4 MG/1
0.4 CAPSULE ORAL DAILY
Qty: 90 CAPSULE | Refills: 3 | Status: SHIPPED | OUTPATIENT
Start: 2021-11-04

## 2021-11-19 DIAGNOSIS — E11.21 TYPE 2 DIABETES MELLITUS WITH DIABETIC NEPHROPATHY, WITHOUT LONG-TERM CURRENT USE OF INSULIN (HCC): ICD-10-CM

## 2021-11-19 RX ORDER — GLYBURIDE 5 MG/1
5 TABLET ORAL 2 TIMES DAILY
Qty: 180 TABLET | Refills: 3 | Status: SHIPPED
Start: 2021-11-19 | End: 2022-04-19 | Stop reason: SDUPTHER

## 2021-12-07 DIAGNOSIS — F32.A ANXIETY AND DEPRESSION: ICD-10-CM

## 2021-12-07 DIAGNOSIS — F41.9 ANXIETY AND DEPRESSION: ICD-10-CM

## 2021-12-07 RX ORDER — SERTRALINE HYDROCHLORIDE 100 MG/1
100 TABLET, FILM COATED ORAL DAILY
Qty: 90 TABLET | Refills: 3 | Status: SHIPPED | OUTPATIENT
Start: 2021-12-07

## 2021-12-07 RX ORDER — FLASH GLUCOSE SENSOR
1 KIT MISCELLANEOUS
COMMUNITY
End: 2021-12-07 | Stop reason: SDUPTHER

## 2021-12-07 RX ORDER — FLASH GLUCOSE SENSOR
1 KIT MISCELLANEOUS
Qty: 2 EACH | Refills: 3 | Status: SHIPPED | OUTPATIENT
Start: 2021-12-07

## 2021-12-09 ENCOUNTER — TELEPHONE (OUTPATIENT)
Dept: CARDIOLOGY | Age: 61
End: 2021-12-09

## 2021-12-09 NOTE — TELEPHONE ENCOUNTER
3rd and final attempt to reschedule pt for a stress test. Left message for pt to call back to reschedule.

## 2021-12-13 DIAGNOSIS — I10 ESSENTIAL HYPERTENSION: ICD-10-CM

## 2021-12-13 RX ORDER — METOPROLOL SUCCINATE 50 MG/1
50 TABLET, EXTENDED RELEASE ORAL 2 TIMES DAILY
Qty: 180 TABLET | Refills: 3 | Status: SHIPPED | OUTPATIENT
Start: 2021-12-13

## 2021-12-29 ENCOUNTER — TELEPHONE (OUTPATIENT)
Dept: PRIMARY CARE CLINIC | Age: 61
End: 2021-12-29

## 2021-12-29 DIAGNOSIS — K12.0 APHTHOUS STOMATITIS: Primary | ICD-10-CM

## 2021-12-29 RX ORDER — ACYCLOVIR 400 MG/1
400 TABLET ORAL 3 TIMES DAILY
Qty: 15 TABLET | Refills: 2 | Status: SHIPPED | OUTPATIENT
Start: 2021-12-29 | End: 2022-01-03

## 2021-12-29 NOTE — TELEPHONE ENCOUNTER
Patient phoned stating he has multiple cold sores on his tongue and would like you to prescribe med for it. You had given him  Acyclovir 200mg caps tid x 10days. Please advise.     ANAMARIA Lydia

## 2022-01-27 ENCOUNTER — HOSPITAL ENCOUNTER (OUTPATIENT)
Age: 62
Discharge: HOME | End: 2022-01-27
Payer: COMMERCIAL

## 2022-01-27 LAB
ALBUMIN SERPL-MCNC: 4.9 G/DL (ref 3.5–5.2)
ALP BLD-CCNC: 73 U/L (ref 40–129)
ALT SERPL-CCNC: 54 U/L (ref 0–40)
ANION GAP SERPL CALCULATED.3IONS-SCNC: 13 MMOL/L (ref 7–16)
AST SERPL-CCNC: 36 U/L (ref 0–39)
BACTERIA: NORMAL /HPF
BASOPHILS ABSOLUTE: 0.04 E9/L (ref 0–0.2)
BASOPHILS RELATIVE PERCENT: 0.6 % (ref 0–2)
BILIRUB SERPL-MCNC: 0.3 MG/DL (ref 0–1.2)
BILIRUBIN URINE: NEGATIVE
BLOOD, URINE: ABNORMAL
BUN BLDV-MCNC: 25 MG/DL (ref 6–23)
CALCIUM SERPL-MCNC: 9.9 MG/DL (ref 8.6–10.2)
CHLORIDE BLD-SCNC: 100 MMOL/L (ref 98–107)
CLARITY: CLEAR
CO2: 24 MMOL/L (ref 22–29)
COLOR: YELLOW
CREAT SERPL-MCNC: 1.1 MG/DL (ref 0.7–1.2)
CREATININE URINE: 83 MG/DL (ref 40–278)
EOSINOPHILS ABSOLUTE: 0.17 E9/L (ref 0.05–0.5)
EOSINOPHILS RELATIVE PERCENT: 2.4 % (ref 0–6)
GFR AFRICAN AMERICAN: >60
GFR NON-AFRICAN AMERICAN: >60 ML/MIN/1.73
GLUCOSE BLD-MCNC: 199 MG/DL (ref 74–99)
GLUCOSE URINE: >=1000 MG/DL
HCT VFR BLD CALC: 47.1 % (ref 37–54)
HEMOGLOBIN: 15.7 G/DL (ref 12.5–16.5)
IMMATURE GRANULOCYTES #: 0.03 E9/L
IMMATURE GRANULOCYTES %: 0.4 % (ref 0–5)
KETONES, URINE: NEGATIVE MG/DL
LEUKOCYTE ESTERASE, URINE: NEGATIVE
LYMPHOCYTES ABSOLUTE: 0.72 E9/L (ref 1.5–4)
LYMPHOCYTES RELATIVE PERCENT: 10 % (ref 20–42)
MAGNESIUM: 2 MG/DL (ref 1.6–2.6)
MCH RBC QN AUTO: 29.2 PG (ref 26–35)
MCHC RBC AUTO-ENTMCNC: 33.3 % (ref 32–34.5)
MCV RBC AUTO: 87.5 FL (ref 80–99.9)
MICROALBUMIN UR-MCNC: 1393.3 MG/L
MICROALBUMIN/CREAT UR-RTO: 1678.7 (ref 0–30)
MONOCYTES ABSOLUTE: 0.57 E9/L (ref 0.1–0.95)
MONOCYTES RELATIVE PERCENT: 7.9 % (ref 2–12)
NEUTROPHILS ABSOLUTE: 5.66 E9/L (ref 1.8–7.3)
NEUTROPHILS RELATIVE PERCENT: 78.7 % (ref 43–80)
NITRITE, URINE: NEGATIVE
PARATHYROID HORMONE INTACT: 29 PG/ML (ref 15–65)
PDW BLD-RTO: 13.8 FL (ref 11.5–15)
PH UA: 5 (ref 5–9)
PHOSPHORUS: 3.3 MG/DL (ref 2.5–4.5)
PLATELET # BLD: 147 E9/L (ref 130–450)
PMV BLD AUTO: 9.7 FL (ref 7–12)
POTASSIUM SERPL-SCNC: 4.3 MMOL/L (ref 3.5–5)
PROTEIN PROTEIN: 168 MG/DL (ref 0–12)
PROTEIN UA: 100 MG/DL
PROTEIN/CREAT RATIO: 2
PROTEIN/CREAT RATIO: 2 (ref 0–0.2)
RBC # BLD: 5.38 E12/L (ref 3.8–5.8)
RBC UA: NORMAL /HPF (ref 0–2)
SODIUM BLD-SCNC: 137 MMOL/L (ref 132–146)
SPECIFIC GRAVITY UA: 1.02 (ref 1–1.03)
TOTAL PROTEIN: 7.9 G/DL (ref 6.4–8.3)
UROBILINOGEN, URINE: 0.2 E.U./DL
VITAMIN D 25-HYDROXY: 11 NG/ML (ref 30–100)
WBC # BLD: 7.2 E9/L (ref 4.5–11.5)
WBC UA: NORMAL /HPF (ref 0–5)

## 2022-01-27 PROCEDURE — 82570 ASSAY OF URINE CREATININE: CPT

## 2022-01-27 PROCEDURE — 82044 UR ALBUMIN SEMIQUANTITATIVE: CPT

## 2022-01-27 PROCEDURE — 84100 ASSAY OF PHOSPHORUS: CPT

## 2022-01-27 PROCEDURE — 83735 ASSAY OF MAGNESIUM: CPT

## 2022-01-27 PROCEDURE — 84156 ASSAY OF PROTEIN URINE: CPT

## 2022-01-27 PROCEDURE — 80053 COMPREHEN METABOLIC PANEL: CPT

## 2022-01-27 PROCEDURE — 82306 VITAMIN D 25 HYDROXY: CPT

## 2022-01-27 PROCEDURE — 83970 ASSAY OF PARATHORMONE: CPT

## 2022-01-27 PROCEDURE — 36415 COLL VENOUS BLD VENIPUNCTURE: CPT

## 2022-01-27 PROCEDURE — 81001 URINALYSIS AUTO W/SCOPE: CPT

## 2022-01-27 PROCEDURE — 85025 COMPLETE CBC W/AUTO DIFF WBC: CPT

## 2022-01-31 ENCOUNTER — OFFICE VISIT (OUTPATIENT)
Dept: PRIMARY CARE CLINIC | Age: 62
End: 2022-01-31
Payer: COMMERCIAL

## 2022-01-31 VITALS
BODY MASS INDEX: 33.43 KG/M2 | OXYGEN SATURATION: 96 % | HEART RATE: 83 BPM | HEIGHT: 66 IN | WEIGHT: 208 LBS | DIASTOLIC BLOOD PRESSURE: 70 MMHG | SYSTOLIC BLOOD PRESSURE: 118 MMHG | RESPIRATION RATE: 18 BRPM | TEMPERATURE: 97.6 F

## 2022-01-31 DIAGNOSIS — J02.9 SORE THROAT: Primary | ICD-10-CM

## 2022-01-31 DIAGNOSIS — R05.9 COUGH: ICD-10-CM

## 2022-01-31 PROCEDURE — 99213 OFFICE O/P EST LOW 20 MIN: CPT | Performed by: PHYSICIAN ASSISTANT

## 2022-01-31 RX ORDER — BENZONATATE 100 MG/1
100 CAPSULE ORAL 2 TIMES DAILY PRN
Qty: 20 CAPSULE | Refills: 0 | Status: SHIPPED | OUTPATIENT
Start: 2022-01-31 | End: 2022-02-07

## 2022-01-31 RX ORDER — AZITHROMYCIN 250 MG/1
250 TABLET, FILM COATED ORAL SEE ADMIN INSTRUCTIONS
Qty: 6 TABLET | Refills: 0 | Status: SHIPPED | OUTPATIENT
Start: 2022-01-31 | End: 2022-02-05

## 2022-01-31 ASSESSMENT — PATIENT HEALTH QUESTIONNAIRE - PHQ9
4. FEELING TIRED OR HAVING LITTLE ENERGY: 1
SUM OF ALL RESPONSES TO PHQ QUESTIONS 1-9: 1
SUM OF ALL RESPONSES TO PHQ QUESTIONS 1-9: 1
5. POOR APPETITE OR OVEREATING: 0
9. THOUGHTS THAT YOU WOULD BE BETTER OFF DEAD, OR OF HURTING YOURSELF: 0
2. FEELING DOWN, DEPRESSED OR HOPELESS: 0
SUM OF ALL RESPONSES TO PHQ QUESTIONS 1-9: 1
SUM OF ALL RESPONSES TO PHQ9 QUESTIONS 1 & 2: 0
3. TROUBLE FALLING OR STAYING ASLEEP: 0
10. IF YOU CHECKED OFF ANY PROBLEMS, HOW DIFFICULT HAVE THESE PROBLEMS MADE IT FOR YOU TO DO YOUR WORK, TAKE CARE OF THINGS AT HOME, OR GET ALONG WITH OTHER PEOPLE: 0
7. TROUBLE CONCENTRATING ON THINGS, SUCH AS READING THE NEWSPAPER OR WATCHING TELEVISION: 0
6. FEELING BAD ABOUT YOURSELF - OR THAT YOU ARE A FAILURE OR HAVE LET YOURSELF OR YOUR FAMILY DOWN: 0
1. LITTLE INTEREST OR PLEASURE IN DOING THINGS: 0
8. MOVING OR SPEAKING SO SLOWLY THAT OTHER PEOPLE COULD HAVE NOTICED. OR THE OPPOSITE, BEING SO FIGETY OR RESTLESS THAT YOU HAVE BEEN MOVING AROUND A LOT MORE THAN USUAL: 0
SUM OF ALL RESPONSES TO PHQ QUESTIONS 1-9: 1

## 2022-01-31 NOTE — PROGRESS NOTES
Chief Complaint   Patient presents with    Pharyngitis     cough,       HPI:  Patient is here for cough and sore throat x 3 days. His cough is mild, and productive. He has \"syrup from last time\" at home that seems to help. He denies fever. No body aches. No weakness. No cp or sob. He denies asthma hx, and is a nonsmoker. No ha or facial pain. He recently had covid 19. He has no sick contacts. He feels that he is slightly improved today. Patient's past medical, surgical, social and/or family history reviewed, updated in chart, and are non-contributory (unless otherwise stated). Medications and allergies also reviewed and updated in chart. Review of Systems:  Constitutional:  No fever, no fatigue, no chills, no headaches, no weight change  Dermatology:  No rash, no mole, no dry or sensitive skin  ENT:  +cough, + sore throat, no sinus pain, no runny nose, no ear pain  Cardiology:  No chest pain, no palpitations, no leg edema, no shortness of breath, no PND  Gastroenterology:  No dysphagia, no abdominal pain, no nausea, no vomiting, no constipation, no diarrhea, no heartburn  Musculoskeletal:  No joint pain, no leg cramps, no back pain, no muscle aches  Respiratory:  No shortness of breath, no orthopnea, no wheezing, no TRAN, no hemoptysis  Urology:  No blood in the urine, no urinary frequency, no urinary incontinence, no urinary urgency, no nocturia, no dysuria    Vitals:    01/31/22 1601   BP: 118/70   Pulse: 83   Resp: 18   Temp: 97.6 °F (36.4 °C)   SpO2: 96%   Weight: 208 lb (94.3 kg)   Height: 5' 6\" (1.676 m)       Physical Exam  Constitutional:       General: He is not in acute distress. Appearance: He is well-developed. HENT:      Head: Normocephalic and atraumatic. Right Ear: External ear normal.      Left Ear: External ear normal.      Nose: Nose normal.      Mouth/Throat: Tonsils: No tonsillar exudate. 2+ on the right. Eyes:      General: No scleral icterus. Conjunctiva/sclera: Conjunctivae normal.      Pupils: Pupils are equal, round, and reactive to light. Neck:      Thyroid: No thyromegaly. Cardiovascular:      Rate and Rhythm: Normal rate and regular rhythm. Heart sounds: Normal heart sounds. No murmur heard. Pulmonary:      Effort: Pulmonary effort is normal. No accessory muscle usage or respiratory distress. Breath sounds: Normal breath sounds. No wheezing. Musculoskeletal:         General: Normal range of motion. Cervical back: Normal range of motion and neck supple. Skin:     General: Skin is warm and dry. Findings: No rash. Neurological:      Mental Status: He is alert and oriented to person, place, and time. Deep Tendon Reflexes: Reflexes are normal and symmetric. Psychiatric:         Speech: Speech normal.         Behavior: Behavior normal.         Assessment/Plan:      Elyssa Landon was seen today for pharyngitis. Diagnoses and all orders for this visit:    Sore throat  -     azithromycin (ZITHROMAX) 250 MG tablet; Take 1 tablet by mouth See Admin Instructions for 5 days 500mg on day 1 followed by 250mg on days 2 - 5    Cough  -     benzonatate (TESSALON) 100 MG capsule; Take 1 capsule by mouth 2 times daily as needed for Cough      As above. Call or go to ED immediately if symptoms worsen or persist.  Return if symptoms worsen or fail to improve. , or sooner if necessary. Educational materials and/or home exercises printed for patient's review and were included in patient instructions on his/her After Visit Summary and given to patient at the end of visit. Counseled regarding above diagnosis, including possible risks and complications,  especially if left uncontrolled. Counseled regarding the possible side effects, risks, benefits and alternatives to treatment; patient and/or guardian verbalizes understanding, agrees, feels comfortable with and wishes to proceed with above treatment plan.     Advised

## 2022-03-25 DIAGNOSIS — Z79.4 TYPE 2 DIABETES MELLITUS WITHOUT COMPLICATION, WITH LONG-TERM CURRENT USE OF INSULIN (HCC): ICD-10-CM

## 2022-03-25 DIAGNOSIS — E11.9 TYPE 2 DIABETES MELLITUS WITHOUT COMPLICATION, WITH LONG-TERM CURRENT USE OF INSULIN (HCC): ICD-10-CM

## 2022-03-25 RX ORDER — EMPAGLIFLOZIN 25 MG/1
TABLET, FILM COATED ORAL
Qty: 30 TABLET | Refills: 5 | OUTPATIENT
Start: 2022-03-25

## 2022-04-11 DIAGNOSIS — Z79.4 TYPE 2 DIABETES MELLITUS WITHOUT COMPLICATION, WITH LONG-TERM CURRENT USE OF INSULIN (HCC): ICD-10-CM

## 2022-04-11 DIAGNOSIS — E11.9 TYPE 2 DIABETES MELLITUS WITHOUT COMPLICATION, WITH LONG-TERM CURRENT USE OF INSULIN (HCC): ICD-10-CM

## 2022-04-12 ENCOUNTER — TELEPHONE (OUTPATIENT)
Dept: ENDOCRINOLOGY | Age: 62
End: 2022-04-12

## 2022-04-12 DIAGNOSIS — Z79.4 TYPE 2 DIABETES MELLITUS WITHOUT COMPLICATION, WITH LONG-TERM CURRENT USE OF INSULIN (HCC): ICD-10-CM

## 2022-04-12 DIAGNOSIS — E11.9 TYPE 2 DIABETES MELLITUS WITHOUT COMPLICATION, WITH LONG-TERM CURRENT USE OF INSULIN (HCC): ICD-10-CM

## 2022-04-12 RX ORDER — EMPAGLIFLOZIN 25 MG/1
TABLET, FILM COATED ORAL
Qty: 30 TABLET | Refills: 5 | OUTPATIENT
Start: 2022-04-12

## 2022-04-12 RX ORDER — EMPAGLIFLOZIN 25 MG/1
TABLET, FILM COATED ORAL
Qty: 30 TABLET | Refills: 0 | Status: SHIPPED
Start: 2022-04-12 | End: 2022-04-19 | Stop reason: SDUPTHER

## 2022-04-12 NOTE — TELEPHONE ENCOUNTER
JARDIANCE 25 MG tablet , metFORMIN (GLUCOPHAGE) 1000 MG tablet . Patient completely out of medication. Please advise [phone].

## 2022-04-19 ENCOUNTER — OFFICE VISIT (OUTPATIENT)
Dept: ENDOCRINOLOGY | Age: 62
End: 2022-04-19
Payer: COMMERCIAL

## 2022-04-19 VITALS
HEIGHT: 66 IN | SYSTOLIC BLOOD PRESSURE: 136 MMHG | WEIGHT: 209.6 LBS | HEART RATE: 70 BPM | BODY MASS INDEX: 33.68 KG/M2 | DIASTOLIC BLOOD PRESSURE: 88 MMHG

## 2022-04-19 DIAGNOSIS — E11.21 TYPE 2 DIABETES MELLITUS WITH DIABETIC NEPHROPATHY, WITHOUT LONG-TERM CURRENT USE OF INSULIN (HCC): ICD-10-CM

## 2022-04-19 DIAGNOSIS — Z79.4 TYPE 2 DIABETES MELLITUS WITHOUT COMPLICATION, WITH LONG-TERM CURRENT USE OF INSULIN (HCC): Primary | ICD-10-CM

## 2022-04-19 DIAGNOSIS — E11.9 TYPE 2 DIABETES MELLITUS WITHOUT COMPLICATION, WITH LONG-TERM CURRENT USE OF INSULIN (HCC): Primary | ICD-10-CM

## 2022-04-19 DIAGNOSIS — E55.9 VITAMIN D DEFICIENCY: ICD-10-CM

## 2022-04-19 LAB — HBA1C MFR BLD: 7.1 %

## 2022-04-19 PROCEDURE — 83036 HEMOGLOBIN GLYCOSYLATED A1C: CPT | Performed by: INTERNAL MEDICINE

## 2022-04-19 PROCEDURE — 3051F HG A1C>EQUAL 7.0%<8.0%: CPT | Performed by: INTERNAL MEDICINE

## 2022-04-19 PROCEDURE — 99213 OFFICE O/P EST LOW 20 MIN: CPT | Performed by: INTERNAL MEDICINE

## 2022-04-19 RX ORDER — EMPAGLIFLOZIN 25 MG/1
TABLET, FILM COATED ORAL
Qty: 90 TABLET | Refills: 3 | Status: SHIPPED | OUTPATIENT
Start: 2022-04-19

## 2022-04-19 RX ORDER — GLYBURIDE 5 MG/1
5 TABLET ORAL DAILY
Qty: 90 TABLET | Refills: 3 | Status: SHIPPED | OUTPATIENT
Start: 2022-04-19

## 2022-04-19 NOTE — PROGRESS NOTES
[address] [facility] Department of Endocrinology Diabetes and Metabolism   [address] [place] [address]   Phone: [phone]  Fax: [phone]    Date of Service: 4/19/2022  Primary Care Physician: James Pina MD  Provider: Roe Deutsch MD     Reason for the visit:  Poorly controlled DM type 2     History of Present Illness: The history is provided by the patient. No  was used. Accuracy of the patient data is excellent.   Alex Phillips is a very pleasant 63 y.o. male seen today for diabetes management     Alex Phillips was diagnosed with diabetes around 20 years ago and currently on Metformin 1000 mg bid , Jardiance 25 mg daily Glyburide 5 mg daily   The patient has been checking blood sugar 1-2 times/day   Most recent A1c results summarized below  Lab Results   Component Value Date    LABA1C 7.1 04/19/2022    LABA1C 6.7 07/09/2021    LABA1C 9.9 05/18/2021     Patient has had no hypoglycemic episodes   The patient has been mindful of what has been eating and following diabetic diet as encouraged  I reviewed current medications and the patient has no issues with diabetes medications  Alex Phillips is up to date with eye exam and denied any history of diabetic retinopathy   The patient performs his own feet care  Microvascular complications:  No Retinopathy, Nephropathy or Neuropathy   Macrovascular complications: no CAD, PVD, or Stroke    PAST MEDICAL HISTORY   Past Medical History:   Diagnosis Date    Anxiety     Appendicitis 01/17/2020    Cellulitis 07/10/2021    Class 1 obesity due to excess calories in adult 12/03/2020    Depression     Diabetes mellitus ([address] 2005    Diabetic nephropathy associated with type 2 diabetes mellitus ([address] 12/03/2020    Gastroesophageal reflux disease without esophagitis 12/03/2020    Grade II diastolic dysfunction 02/3789    Hepatic steatosis 12/03/2020    Hyperlipidemia     Hypertension 2005    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 Feliz Frank Tubular adenoma; diverticulosis    LAPAROSCOPIC APPENDECTOMY N/A 1/17/2020    APPENDECTOMY LAPAROSCOPIC, EXPLORATORY LAPAROTOMY performed by Larry Ibarra MD at [address]   Tobacco:   reports that he has never smoked. He has never used smokeless tobacco.  Alcohol:   reports no history of alcohol use. Drugs:   reports no history of drug use.     FAMILY HISTORY   Family History   Problem Relation Age of Onset    Other Mother         Alive age 80 26; minimal arthritis    Arthritis Mother     Atrial Fibrillation Mother     Liver Disease Mother     Diabetes Father         Alive age 80 26    Hypertension Father     High Blood Pressure Father     Heart Surgery Father        ALLERGIES AND DRUG REACTIONS   No Known Allergies    CURRENT MEDICATIONS   Current Outpatient Medications   Medication Sig Dispense Refill    empagliflozin (JARDIANCE) 25 MG tablet TAKE 1 TABLET BY MOUTH DAILY 30 tablet 0    metFORMIN (GLUCOPHAGE) 1000 MG tablet Take 1 tablet twice a day 60 tablet 0    metoprolol succinate (TOPROL XL) 50 MG extended release tablet Take 1 tablet by mouth 2 times daily 180 tablet 3    sertraline (ZOLOFT) 100 MG tablet Take 1 tablet by mouth daily 90 tablet 3    Continuous Blood Gluc Sensor (FREESTYLE ROCIO 14 DAY SENSOR) MISC 1 each by Does not apply route every 14 days 2 each 3    glyBURIDE (DIABETA) 5 MG tablet Take 1 tablet by mouth 2 times daily 180 tablet 3    tamsulosin (FLOMAX) 0.4 MG capsule Take 1 capsule by mouth daily 90 capsule 3    fenofibrate micronized (LOFIBRA) 134 MG capsule Take 1 capsule by mouth every morning (before breakfast) 90 capsule 3    potassium citrate (UROCIT-K) 10 MEQ (1080 MG) extended release tablet Take 1 tablet by mouth 2 times daily (with meals) 60 tablet 3    pravastatin (PRAVACHOL) 40 MG tablet Take 1 tablet by mouth daily 90 tablet 3    allopurinol (ZYLOPRIM) 100 MG tablet Take 1 tablet by mouth daily 90 tablet 3    amLODIPine (NORVASC) 10 MG tablet Take 10 mg by mouth daily      losartan (COZAAR) 100 MG tablet Take 1 tablet by mouth daily 90 tablet 3    aspirin 81 MG tablet Take 81 mg by mouth daily       No current facility-administered medications for this visit. Review of Systems  Constitutional: No fever, no chills, no diaphoresis, no generalized weakness. HEENT: No blurred vision, No sore throat, no ear pain, no hair loss  Neck: denied any neck swelling, difficulty swallowing,   Cardio-pulmonary: No CP, SOB or palpitation, No orthopnea or PND. No cough or wheezing. GI: No N/V/D, no constipation, No abdominal pain, no melena or hematochezia   : Denied any dysuria, hematuria, flank pain, discharge, or incontinence. Skin: denied any rash, ulcer, Hirsute, or hyperpigmentation. MSK: denied any joint deformity, joint pain/swelling, muscle pain, or back pain. Neuro: no numbness, no tingling, no weakness, _    OBJECTIVE    /88   Pulse 70   Ht 5' 6\" (1.676 m)   Wt 209 lb 9.6 oz (95.1 kg)   BMI 33.83 kg/m²   BP Readings from Last 4 Encounters:   04/19/22 136/88   01/31/22 118/70   10/18/21 138/82   07/22/21 120/82     Wt Readings from Last 6 Encounters:   04/19/22 209 lb 9.6 oz (95.1 kg)   01/31/22 208 lb (94.3 kg)   10/18/21 205 lb (93 kg)   07/22/21 210 lb (95.3 kg)   07/21/21 210 lb 9.6 oz (95.5 kg)   07/12/21 212 lb 6.4 oz (96.3 kg)     Physical examination:  General: awake alert, oriented x3, no abnormal position or movements.   HEENT: normocephalic non-traumatic, no exophthalmos   Neck: supple, no LN enlargement, no thyromegaly, no thyroid tenderness, no JVD. Pulm: Clear equal air entry no added sounds, no wheezing or rhonchi    CVS: S1 + S2, no murmur, no heave. Dorsalis pedis pulse palpable   Abd: soft lax, no tenderness, no organomegaly, audible bowel sounds. Skin: warm, no lesions, no rash.  No callus, no Ulcers, No acanthosis nigricans  Musculoskeletal: No back tenderness, no kyphosis/scoliosis    Neuro: CN intact, Monofilament sensation decreased bilateral , muscle power normal  Psych: normal mood, and affect    Review of Laboratory Data:  I personally reviewed the following lab:  Lab Results   Component Value Date/Time    WBC 7.2 01/27/2022 09:20 AM    RBC 5.38 01/27/2022 09:20 AM    HGB 15.7 01/27/2022 09:20 AM    HCT 47.1 01/27/2022 09:20 AM    MCV 87.5 01/27/2022 09:20 AM    MCH 29.2 01/27/2022 09:20 AM    MCHC 33.3 01/27/2022 09:20 AM    RDW 13.8 01/27/2022 09:20 AM     01/27/2022 09:20 AM    MPV 9.7 01/27/2022 09:20 AM      Lab Results   Component Value Date/Time     01/27/2022 09:20 AM    K 4.3 01/27/2022 09:20 AM    K 4.2 07/08/2021 04:19 PM    CO2 24 01/27/2022 09:20 AM    BUN 25 (H) 01/27/2022 09:20 AM    CREATININE 1.1 01/27/2022 09:20 AM    CALCIUM 9.9 01/27/2022 09:20 AM    LABGLOM >60 01/27/2022 09:20 AM    GFRAA >60 01/27/2022 09:20 AM      Lab Results   Component Value Date/Time    TSH 1.370 07/09/2021 04:50 AM    T4FREE 1.16 07/09/2021 04:50 AM     Lab Results   Component Value Date    LABA1C 7.1 04/19/2022    GLUCOSE 199 01/27/2022    MALBCR 1678.7 01/27/2022    LABMICR 1393.3 01/27/2022    LABCREA 83 01/27/2022     Lab Results   Component Value Date    LABA1C 7.1 04/19/2022    LABA1C 6.7 07/09/2021    LABA1C 9.9 05/18/2021     Lab Results   Component Value Date    TRIG 599 07/09/2021    HDL 40 07/09/2021    LDLCALC - 07/09/2021    CHOL 148 07/09/2021     Lab Results   Component Value Date    VITD25 11 01/27/2022       ASSESSMENT & RECOMMENDATIONS   adriana Arce 63 y.o.-old male seen in for the following issues     Diabetes Mellitus Type 2     · Under good control, A1c 7.1%   · Continue Metformin 1000 mg bid , Jardiance 25 mg daily Glyburide 5 mg daily   · The patient was advised to check blood sugars 1-2 times a day   · Discussed with patient A1c and blood sugar goals  · Patient will need routine diabetes maintenance and prevention  · Diabetes labs before next visit     Dietary noncompliance   Discussed with patient the importance of eating consistent carbohydrate meals, avoiding high glycemic index food. Also, discussed with patient the risk and negative consequences of dietary noncompliance on blood glucose control, blood pressure and weight    vitD deficiency    vitD level was 1 in 1/2022. Started on weekly vitD supplement by his PCP     I personally reviewed external notes from PCP and other patient's care team providers, and personally interpreted labs associated with the above diagnosis. I also ordered labs to further assess and manage the above addressed medical conditions. Return in about 6 months (around 10/19/2022) for DM type 2, VitD deficiency. The above issues were reviewed with the patient who understood and agreed with the plan. Thank you for allowing us to participate in the care of this patient. Please do not hesitate to contact us with any additional questions. Diagnosis Orders   1. Type 2 diabetes mellitus without complication, with long-term current use of insulin (HCC)  POCT glycosylated hemoglobin (Hb A1C)    metFORMIN (GLUCOPHAGE) 1000 MG tablet    empagliflozin (JARDIANCE) 25 MG tablet    Basic Metabolic Panel    Hemoglobin A1C    Lipid Panel    Microalbumin / Creatinine Urine Ratio   2. Vitamin D deficiency  Vitamin D 25 Hydroxy    Basic Metabolic Panel   3.  Type 2 diabetes mellitus with diabetic nephropathy, without long-term current use of insulin (HCC)  glyBURIDE (DIABETA) 5 MG tablet     Benjamin Hanks MD  Endocrinologist, [facility] and Endocrinology   [address] [facility] [address]   Phone: [phone]  Fax: [phone]  --------------------------------------------  An electronic signature was used to authenticate this note.  Anju Mccormick MD on 4/19/2022 at 9:44 AM

## 2022-06-04 DIAGNOSIS — J06.9 UPPER RESPIRATORY TRACT INFECTION, UNSPECIFIED TYPE: Primary | ICD-10-CM

## 2022-06-04 RX ORDER — AZITHROMYCIN 250 MG/1
250 TABLET, FILM COATED ORAL SEE ADMIN INSTRUCTIONS
Qty: 6 TABLET | Refills: 0 | Status: SHIPPED | OUTPATIENT
Start: 2022-06-04 | End: 2022-06-09

## 2022-06-06 ENCOUNTER — TELEPHONE (OUTPATIENT)
Dept: PRIMARY CARE CLINIC | Age: 62
End: 2022-06-06

## 2022-06-06 NOTE — TELEPHONE ENCOUNTER
----- Message from [place] sent at 6/6/2022 10:07 AM EDT -----  Subject: Appointment Request    Reason for Call: Routine Existing Condition Follow Up    QUESTIONS  Type of Appointment? Established Patient  Reason for appointment request? Available appointments did not meet   patient need  Additional Information for Provider? Pt is calling the office this morning   to see if his pcp can fit him in today. Pt spoke to his pcp on Saturday   and his pcp called him something in for sore throat and body aches. Pt pcp   told him to call on Monday to make an appt for same day. Please call pt   back. ---------------------------------------------------------------------------  --------------  Paloma PASTRANA  What is the best way for the office to contact you? OK to leave message on   voicemail  Preferred Call Back Phone Number? [phone]  ---------------------------------------------------------------------------  --------------  SCRIPT ANSWERS  Relationship to Patient? Self  Is this follow up request related to routine Diabetes Management? No  Have you been diagnosed with, awaiting test results for, or told that you   are suspected of having COVID-19 (Coronavirus)? (If patient has tested   negative or was tested as a requirement for work, school, or travel and   not based on symptoms, answer no)? No  Within the past 10 days have you developed any of the following symptoms   (answer no if symptoms have been present longer than 10 days or began   more than 10 days ago)? Fever or Chills, Cough, Shortness of breath or   difficulty breathing, Loss of taste or smell, Sore throat, Nasal   congestion, Sneezing or runny nose, Fatigue or generalized body aches   (answer no if pain is specific to a body part e.g. back pain), Diarrhea,   Headache?  Yes

## 2022-06-07 ENCOUNTER — OFFICE VISIT (OUTPATIENT)
Dept: PRIMARY CARE CLINIC | Age: 62
End: 2022-06-07
Payer: COMMERCIAL

## 2022-06-07 DIAGNOSIS — U07.1 COVID-19 VIRUS INFECTION: Primary | ICD-10-CM

## 2022-06-07 DIAGNOSIS — R05.9 COUGH: ICD-10-CM

## 2022-06-07 LAB
INFLUENZA A ANTIBODY: NORMAL
INFLUENZA B ANTIBODY: NORMAL
Lab: ABNORMAL
PERFORMING INSTRUMENT: ABNORMAL
QC PASS/FAIL: ABNORMAL
SARS-COV-2, POC: DETECTED

## 2022-06-07 PROCEDURE — 87804 INFLUENZA ASSAY W/OPTIC: CPT | Performed by: INTERNAL MEDICINE

## 2022-06-07 PROCEDURE — 99213 OFFICE O/P EST LOW 20 MIN: CPT | Performed by: INTERNAL MEDICINE

## 2022-06-07 PROCEDURE — 87426 SARSCOV CORONAVIRUS AG IA: CPT | Performed by: INTERNAL MEDICINE

## 2022-06-08 VITALS
RESPIRATION RATE: 16 BRPM | HEIGHT: 66 IN | DIASTOLIC BLOOD PRESSURE: 80 MMHG | BODY MASS INDEX: 33.59 KG/M2 | WEIGHT: 209 LBS | HEART RATE: 84 BPM | TEMPERATURE: 97.1 F | SYSTOLIC BLOOD PRESSURE: 130 MMHG | OXYGEN SATURATION: 98 %

## 2022-06-08 NOTE — PROGRESS NOTES
Shiv Ibanez  6/8/22     Chief Complaint   Patient presents with    Congestion    Cough        No Known Allergies     Current Outpatient Medications   Medication Sig Dispense Refill    azithromycin (ZITHROMAX) 250 MG tablet Take 1 tablet by mouth See Admin Instructions for 5 days 500mg on day 1 followed by 250mg on days 2 - 5 6 tablet 0    glyBURIDE (DIABETA) 5 MG tablet Take 1 tablet by mouth daily 90 tablet 3    metFORMIN (GLUCOPHAGE) 1000 MG tablet Take 1 tablet twice a day 180 tablet 3    empagliflozin (JARDIANCE) 25 MG tablet TAKE 1 TABLET BY MOUTH DAILY 90 tablet 3    metoprolol succinate (TOPROL XL) 50 MG extended release tablet Take 1 tablet by mouth 2 times daily 180 tablet 3    sertraline (ZOLOFT) 100 MG tablet Take 1 tablet by mouth daily 90 tablet 3    Continuous Blood Gluc Sensor (FREESTYLE ROCIO 14 DAY SENSOR) MISC 1 each by Does not apply route every 14 days 2 each 3    tamsulosin (FLOMAX) 0.4 MG capsule Take 1 capsule by mouth daily 90 capsule 3    fenofibrate micronized (LOFIBRA) 134 MG capsule Take 1 capsule by mouth every morning (before breakfast) 90 capsule 3    potassium citrate (UROCIT-K) 10 MEQ (1080 MG) extended release tablet Take 1 tablet by mouth 2 times daily (with meals) 60 tablet 3    pravastatin (PRAVACHOL) 40 MG tablet Take 1 tablet by mouth daily 90 tablet 3    allopurinol (ZYLOPRIM) 100 MG tablet Take 1 tablet by mouth daily 90 tablet 3    amLODIPine (NORVASC) 10 MG tablet Take 10 mg by mouth daily      losartan (COZAAR) 100 MG tablet Take 1 tablet by mouth daily 90 tablet 3    aspirin 81 MG tablet Take 81 mg by mouth daily       No current facility-administered medications for this visit. HPI: Patient comes in for follow-up visit. He has been complaining of nasal and sinus congestion and chest congestion and cough for the past several days.   He was started on a Z-Sammy on 6/4/2022 and states he is feeling much better but still has a residual cough and mild congestion. He denies any fever or chills. He has 2 more days on his Z-Sammy. He remains on all of his usual meds and supplements the same as listed on his med list.    Review of Systems: as per HPI      Physical Exam:    Patient is a 63 y.o. male. Patient appears to be in no distress. Breathing comfortably. Ambulates without assistance. HEENT: Mild nasal congestion. Neck supple, no adenopathy or bruits. Heart RR, no MGR. Lungs clear. Abd: normal  Ext: no edema. Peripheral pulses: normal.  No neurologic deficits noted. COVID-19 antigen: Detected. Assessment:    Rachel Rodriguez was seen today for congestion and cough. Diagnoses and all orders for this visit:    COVID-19 virus infection, symptomatically improving. Cough due to above, improving  -     POCT Influenza A/B  -     POCT COVID-19, Antigen          Discussion Notes: He will finish his Z-Sammy and continue to stay adequately hydrated May take sugar-free Robitussin-DM as needed for cough. He will call in a week if his symptoms are not resolved or sooner if worse.

## 2022-06-14 ENCOUNTER — TELEPHONE (OUTPATIENT)
Dept: PRIMARY CARE CLINIC | Age: 62
End: 2022-06-14

## 2022-06-14 DIAGNOSIS — J06.9 UPPER RESPIRATORY TRACT INFECTION, UNSPECIFIED TYPE: Primary | ICD-10-CM

## 2022-06-14 RX ORDER — DEXAMETHASONE 2 MG/1
2 TABLET ORAL 2 TIMES DAILY WITH MEALS
Qty: 6 TABLET | Refills: 0 | Status: SHIPPED | OUTPATIENT
Start: 2022-06-14 | End: 2022-06-17

## 2022-06-14 NOTE — TELEPHONE ENCOUNTER
Pt calling he is very congested, stuffy head  and fatigued what can he do   No fever,sore throat   advise

## 2022-06-14 NOTE — TELEPHONE ENCOUNTER
I will give him 3 days of a low-dose Decadron to help with the congestion. It may elevate his blood sugar somewhat so just be aware of that and make sure he avoids sweets and stays hydrated. Is probably just take some time for the effects of the COVID virus to wear off. Call if he develops any discolored drainage or fever.   I will send a prescription for Decadron to [facility].

## 2022-08-01 ENCOUNTER — OFFICE VISIT (OUTPATIENT)
Dept: PRIMARY CARE CLINIC | Age: 62
End: 2022-08-01
Payer: COMMERCIAL

## 2022-08-01 VITALS
BODY MASS INDEX: 33.27 KG/M2 | HEIGHT: 66 IN | OXYGEN SATURATION: 96 % | HEART RATE: 83 BPM | DIASTOLIC BLOOD PRESSURE: 78 MMHG | RESPIRATION RATE: 16 BRPM | TEMPERATURE: 97.8 F | WEIGHT: 207 LBS | SYSTOLIC BLOOD PRESSURE: 128 MMHG

## 2022-08-01 DIAGNOSIS — H61.22 HEARING LOSS OF LEFT EAR DUE TO CERUMEN IMPACTION: Primary | ICD-10-CM

## 2022-08-01 PROCEDURE — 99213 OFFICE O/P EST LOW 20 MIN: CPT | Performed by: INTERNAL MEDICINE

## 2022-08-01 SDOH — ECONOMIC STABILITY: FOOD INSECURITY: WITHIN THE PAST 12 MONTHS, THE FOOD YOU BOUGHT JUST DIDN'T LAST AND YOU DIDN'T HAVE MONEY TO GET MORE.: NEVER TRUE

## 2022-08-01 SDOH — ECONOMIC STABILITY: FOOD INSECURITY: WITHIN THE PAST 12 MONTHS, YOU WORRIED THAT YOUR FOOD WOULD RUN OUT BEFORE YOU GOT MONEY TO BUY MORE.: NEVER TRUE

## 2022-08-01 ASSESSMENT — SOCIAL DETERMINANTS OF HEALTH (SDOH): HOW HARD IS IT FOR YOU TO PAY FOR THE VERY BASICS LIKE FOOD, HOUSING, MEDICAL CARE, AND HEATING?: NOT HARD AT ALL

## 2022-08-02 NOTE — PROGRESS NOTES
Hernando Ornelas  8/2/22     Chief Complaint   Patient presents with    Dizziness     Left ear plugged and head congestion  x 5 days. No Known Allergies     Current Outpatient Medications   Medication Sig Dispense Refill    glyBURIDE (DIABETA) 5 MG tablet Take 1 tablet by mouth daily 90 tablet 3    metFORMIN (GLUCOPHAGE) 1000 MG tablet Take 1 tablet twice a day 180 tablet 3    empagliflozin (JARDIANCE) 25 MG tablet TAKE 1 TABLET BY MOUTH DAILY 90 tablet 3    metoprolol succinate (TOPROL XL) 50 MG extended release tablet Take 1 tablet by mouth 2 times daily 180 tablet 3    sertraline (ZOLOFT) 100 MG tablet Take 1 tablet by mouth daily 90 tablet 3    Continuous Blood Gluc Sensor (FREESTYLE ROCIO 14 DAY SENSOR) MISC 1 each by Does not apply route every 14 days 2 each 3    tamsulosin (FLOMAX) 0.4 MG capsule Take 1 capsule by mouth daily 90 capsule 3    fenofibrate micronized (LOFIBRA) 134 MG capsule Take 1 capsule by mouth every morning (before breakfast) 90 capsule 3    potassium citrate (UROCIT-K) 10 MEQ (1080 MG) extended release tablet Take 1 tablet by mouth 2 times daily (with meals) 60 tablet 3    pravastatin (PRAVACHOL) 40 MG tablet Take 1 tablet by mouth daily 90 tablet 3    allopurinol (ZYLOPRIM) 100 MG tablet Take 1 tablet by mouth daily 90 tablet 3    amLODIPine (NORVASC) 10 MG tablet Take 10 mg by mouth daily      losartan (COZAAR) 100 MG tablet Take 1 tablet by mouth daily 90 tablet 3    aspirin 81 MG tablet Take 81 mg by mouth daily       No current facility-administered medications for this visit. HPI: Patient comes in today complaining of left ear feels blocked after using a Q-tip and he has had occasional episode of mild vertigo and anxiety since then. Review of Systems: as per HPI      Physical Exam:    Patient is a 63 y.o. male. Patient appears to be in no distress. He is alert and oriented and breathing comfortably. Ambulates without assistance.   HEENT: normal except for deep wax impaction left ear. Hearing is somewhat diminished left ear. Neck supple, no adenopathy or bruits. Heart RR, no MGR. Lungs clear. Abd: normal  Ext: no edema. Peripheral pulses: normal.  No neurologic deficits noted. Assessment:    Alicja Lopez was seen today for dizziness. Diagnoses and all orders for this visit:    Hearing loss of left ear due to cerumen impaction        Discussion Notes: Patient will make his own appointment with audiology or ENT for earwax removal and hearing evaluation.   He will let us know if he needs a referral.

## 2022-08-08 RX ORDER — LOSARTAN POTASSIUM 100 MG/1
100 TABLET ORAL DAILY
Qty: 90 TABLET | Refills: 3 | Status: SHIPPED | OUTPATIENT
Start: 2022-08-08

## 2022-11-04 DIAGNOSIS — Z79.4 TYPE 2 DIABETES MELLITUS WITHOUT COMPLICATION, WITH LONG-TERM CURRENT USE OF INSULIN (HCC): ICD-10-CM

## 2022-11-04 DIAGNOSIS — E55.9 VITAMIN D DEFICIENCY: ICD-10-CM

## 2022-11-04 DIAGNOSIS — E11.9 TYPE 2 DIABETES MELLITUS WITHOUT COMPLICATION, WITH LONG-TERM CURRENT USE OF INSULIN (HCC): ICD-10-CM

## 2022-11-04 LAB
ANION GAP SERPL CALCULATED.3IONS-SCNC: 12 MMOL/L (ref 7–16)
BUN BLDV-MCNC: 18 MG/DL (ref 6–23)
CALCIUM SERPL-MCNC: 10.2 MG/DL (ref 8.6–10.2)
CHLORIDE BLD-SCNC: 99 MMOL/L (ref 98–107)
CHOLESTEROL, TOTAL: 176 MG/DL (ref 0–199)
CO2: 25 MMOL/L (ref 22–29)
CREAT SERPL-MCNC: 1 MG/DL (ref 0.7–1.2)
CREATININE URINE: 75 MG/DL (ref 40–278)
GFR SERPL CREATININE-BSD FRML MDRD: >60 ML/MIN/1.73
GLUCOSE BLD-MCNC: 184 MG/DL (ref 74–99)
HBA1C MFR BLD: 7.4 % (ref 4–5.6)
HDLC SERPL-MCNC: 44 MG/DL
LDL CHOLESTEROL CALCULATED: 61 MG/DL (ref 0–99)
MICROALBUMIN UR-MCNC: 2407.8 MG/L
MICROALBUMIN/CREAT UR-RTO: 3210.4 (ref 0–30)
POTASSIUM SERPL-SCNC: 4.6 MMOL/L (ref 3.5–5)
SODIUM BLD-SCNC: 136 MMOL/L (ref 132–146)
TRIGL SERPL-MCNC: 356 MG/DL (ref 0–149)
VITAMIN D 25-HYDROXY: 17 NG/ML (ref 30–100)
VLDLC SERPL CALC-MCNC: 71 MG/DL

## 2022-11-06 ENCOUNTER — TELEPHONE (OUTPATIENT)
Dept: ENDOCRINOLOGY | Age: 62
End: 2022-11-06

## 2022-11-07 ENCOUNTER — OFFICE VISIT (OUTPATIENT)
Dept: ENDOCRINOLOGY | Age: 62
End: 2022-11-07
Payer: COMMERCIAL

## 2022-11-07 VITALS
BODY MASS INDEX: 33.59 KG/M2 | SYSTOLIC BLOOD PRESSURE: 146 MMHG | HEART RATE: 73 BPM | RESPIRATION RATE: 18 BRPM | HEIGHT: 66 IN | OXYGEN SATURATION: 99 % | WEIGHT: 209 LBS | DIASTOLIC BLOOD PRESSURE: 93 MMHG

## 2022-11-07 DIAGNOSIS — E55.9 VITAMIN D DEFICIENCY: Primary | ICD-10-CM

## 2022-11-07 DIAGNOSIS — Z79.4 TYPE 2 DIABETES MELLITUS WITHOUT COMPLICATION, WITH LONG-TERM CURRENT USE OF INSULIN (HCC): ICD-10-CM

## 2022-11-07 DIAGNOSIS — E11.21 TYPE 2 DIABETES MELLITUS WITH DIABETIC NEPHROPATHY, WITHOUT LONG-TERM CURRENT USE OF INSULIN (HCC): ICD-10-CM

## 2022-11-07 DIAGNOSIS — E11.9 TYPE 2 DIABETES MELLITUS WITHOUT COMPLICATION, WITH LONG-TERM CURRENT USE OF INSULIN (HCC): Primary | ICD-10-CM

## 2022-11-07 DIAGNOSIS — Z79.4 TYPE 2 DIABETES MELLITUS WITHOUT COMPLICATION, WITH LONG-TERM CURRENT USE OF INSULIN (HCC): Primary | ICD-10-CM

## 2022-11-07 DIAGNOSIS — E11.9 TYPE 2 DIABETES MELLITUS WITHOUT COMPLICATION, WITH LONG-TERM CURRENT USE OF INSULIN (HCC): ICD-10-CM

## 2022-11-07 PROCEDURE — 3051F HG A1C>EQUAL 7.0%<8.0%: CPT | Performed by: INTERNAL MEDICINE

## 2022-11-07 PROCEDURE — 3074F SYST BP LT 130 MM HG: CPT | Performed by: INTERNAL MEDICINE

## 2022-11-07 PROCEDURE — 99214 OFFICE O/P EST MOD 30 MIN: CPT | Performed by: INTERNAL MEDICINE

## 2022-11-07 PROCEDURE — 3078F DIAST BP <80 MM HG: CPT | Performed by: INTERNAL MEDICINE

## 2022-11-07 RX ORDER — GLYBURIDE 5 MG/1
5 TABLET ORAL DAILY
Qty: 90 TABLET | Refills: 3 | Status: SHIPPED | OUTPATIENT
Start: 2022-11-07

## 2022-11-07 RX ORDER — BLOOD-GLUCOSE TRANSMITTER
EACH MISCELLANEOUS
Qty: 1 EACH | Refills: 1 | Status: SHIPPED | OUTPATIENT
Start: 2022-11-07

## 2022-11-07 RX ORDER — BLOOD-GLUCOSE SENSOR
EACH MISCELLANEOUS
Qty: 3 EACH | Refills: 5 | Status: SHIPPED | OUTPATIENT
Start: 2022-11-07

## 2022-11-07 NOTE — PROGRESS NOTES
[address] [place] [address] Department of Endocrinology Diabetes and Metabolism   [address] [facility] [address]   Phone: [phone]  Fax: [phone]    Date of Service: 11/7/2022  Primary Care Physician: Doroteo Woodruff MD  Provider: Kris Triplett MD     Reason for the visit:  Poorly controlled DM type 2     History of Present Illness: The history is provided by the patient. No  was used. Accuracy of the patient data is excellent.   Marya Holm is a very pleasant 63 y.o. male seen today for diabetes management     Marya Holm was diagnosed with diabetes around 20 years ago and currently on Metformin 1000 mg bid , Jardiance 25 mg daily Glyburide 5 mg daily   The patient has been checking blood sugar 1-2 times/day   Most recent A1c results summarized below  Lab Results   Component Value Date/Time    LABA1C 7.4 11/04/2022 09:26 AM    LABA1C 7.1 04/19/2022 09:42 AM    LABA1C 6.7 07/09/2021 04:50 AM     Patient has had no hypoglycemic episodes   The patient has been mindful of what has been eating and following diabetic diet as encouraged  I reviewed current medications and the patient has no issues with diabetes medications  Marya Holm is up to date with eye exam and denied any history of diabetic retinopathy   The patient performs his own feet care  Microvascular complications:  No Retinopathy, Nephropathy or Neuropathy   Macrovascular complications: no CAD, PVD, or Stroke    PAST MEDICAL HISTORY   Past Medical History:   Diagnosis Date    Anxiety     Appendicitis 01/17/2020    Cellulitis 07/10/2021    Class 1 obesity due to excess calories in adult 12/03/2020    Depression     Diabetes mellitus ([facility] [address] 2005    Diabetic nephropathy associated with type 2 diabetes mellitus ([address] 12/03/2020    Gastroesophageal reflux disease without esophagitis 12/03/2020    Grade II diastolic dysfunction 22/5513    Hepatic steatosis 12/03/2020    Hyperlipidemia     Hypertension 2005    Infective myositis of right upper extremity 07/08/2021    Kidney stones     X2    Mesenteric hematoma 01/17/2020    Mixed hyperlipidemia 05/13/2021    Proteinuria     Stage 3a chronic kidney disease ([address] 12/03/2020    Type 2 diabetes mellitus without complication (HCC)     Vitamin D deficiency 05/18/2021       PAST SURGICAL HISTORY   Past Surgical History:   Procedure Laterality Date    COLONOSCOPY  11/04/2019    Dr. Ilan Kwan Tubular adenoma; diverticulosis    LAPAROSCOPIC APPENDECTOMY N/A 1/17/2020    APPENDECTOMY LAPAROSCOPIC, EXPLORATORY LAPAROTOMY performed by Hema Hanks MD at [address]   Tobacco:   reports that he has never smoked. He has never used smokeless tobacco.  Alcohol:   reports no history of alcohol use. Drugs:   reports no history of drug use. FAMILY HISTORY   Family History   Problem Relation Age of Onset    Other Mother         Alive age 80 26; minimal arthritis    Arthritis Mother     Atrial Fibrillation Mother     Liver Disease Mother     Diabetes Father         Alive age 80 2020    Hypertension Father     High Blood Pressure Father     Heart Surgery Father        ALLERGIES AND DRUG REACTIONS   No Known Allergies    CURRENT MEDICATIONS   Current Outpatient Medications   Medication Sig Dispense Refill    losartan (COZAAR) 100 MG tablet Take 1 tablet by mouth in the morning.  90 tablet 3    glyBURIDE (DIABETA) 5 MG tablet Take 1 tablet by mouth daily 90 tablet 3    metFORMIN (GLUCOPHAGE) 1000 MG tablet Take 1 tablet twice a day 180 tablet 3    empagliflozin (JARDIANCE) 25 MG tablet TAKE 1 TABLET BY MOUTH DAILY 90 tablet 3    metoprolol succinate (TOPROL XL) 50 MG extended release tablet Take 1 tablet by mouth 2 times daily 180 tablet 3    sertraline (ZOLOFT) 100 MG tablet Take 1 tablet by mouth daily 90 tablet 3    Continuous Blood Gluc Sensor (FREESTYLE ROCIO 14 DAY SENSOR) MISC 1 each by Does not apply route every 14 days 2 each 3    tamsulosin (FLOMAX) 0.4 MG capsule Take 1 capsule by mouth daily 90 capsule 3    fenofibrate micronized (LOFIBRA) 134 MG capsule Take 1 capsule by mouth every morning (before breakfast) 90 capsule 3    potassium citrate (UROCIT-K) 10 MEQ (1080 MG) extended release tablet Take 1 tablet by mouth 2 times daily (with meals) 60 tablet 3    pravastatin (PRAVACHOL) 40 MG tablet Take 1 tablet by mouth daily 90 tablet 3    allopurinol (ZYLOPRIM) 100 MG tablet Take 1 tablet by mouth daily 90 tablet 3    amLODIPine (NORVASC) 10 MG tablet Take 10 mg by mouth daily      aspirin 81 MG tablet Take 81 mg by mouth daily       No current facility-administered medications for this visit. Review of Systems  Constitutional: No fever, no chills, no diaphoresis, no generalized weakness. HEENT: No blurred vision, No sore throat, no ear pain, no hair loss  Neck: denied any neck swelling, difficulty swallowing,   Cardio-pulmonary: No CP, SOB or palpitation, No orthopnea or PND. No cough or wheezing. GI: No N/V/D, no constipation, No abdominal pain, no melena or hematochezia   : Denied any dysuria, hematuria, flank pain, discharge, or incontinence. Skin: denied any rash, ulcer, Hirsute, or hyperpigmentation. MSK: denied any joint deformity, joint pain/swelling, muscle pain, or back pain. Neuro: no numbness, no tingling, no weakness, _    OBJECTIVE    BP (!) 146/93   Pulse 73   Resp 18   Ht 5' 6\" (1.676 m)   Wt 209 lb (94.8 kg)   SpO2 99%   BMI 33.73 kg/m²   BP Readings from Last 4 Encounters:   11/07/22 (!) 146/93   08/01/22 128/78   06/08/22 130/80   04/19/22 136/88     Wt Readings from Last 6 Encounters:   11/07/22 209 lb (94.8 kg)   08/01/22 207 lb (93.9 kg)   06/07/22 209 lb (94.8 kg)   04/19/22 209 lb 9.6 oz (95.1 kg)   01/31/22 208 lb (94.3 kg)   10/18/21 205 lb (93 kg)     Physical examination:  General: awake alert, oriented x3, no abnormal position or movements.   HEENT: normocephalic non-traumatic, no exophthalmos   Neck: supple, no LN enlargement, no thyromegaly, no thyroid tenderness, no JVD. Pulm: Clear equal air entry no added sounds, no wheezing or rhonchi    CVS: S1 + S2, no murmur, no heave. Dorsalis pedis pulse palpable   Abd: soft lax, no tenderness, no organomegaly, audible bowel sounds. Skin: warm, no lesions, no rash.  No callus, no Ulcers, No acanthosis nigricans  Musculoskeletal: No back tenderness, no kyphosis/scoliosis    Neuro: CN intact, Monofilament sensation decreased bilateral , muscle power normal  Psych: normal mood, and affect    Review of Laboratory Data:  I personally reviewed the following lab:  Lab Results   Component Value Date/Time    WBC 7.2 01/27/2022 09:20 AM    RBC 5.38 01/27/2022 09:20 AM    HGB 15.7 01/27/2022 09:20 AM    HCT 47.1 01/27/2022 09:20 AM    MCV 87.5 01/27/2022 09:20 AM    MCH 29.2 01/27/2022 09:20 AM    MCHC 33.3 01/27/2022 09:20 AM    RDW 13.8 01/27/2022 09:20 AM     01/27/2022 09:20 AM    MPV 9.7 01/27/2022 09:20 AM      Lab Results   Component Value Date/Time     11/04/2022 09:26 AM    K 4.6 11/04/2022 09:26 AM    K 4.2 07/08/2021 04:19 PM    CO2 25 11/04/2022 09:26 AM    BUN 18 11/04/2022 09:26 AM    CREATININE 1.0 11/04/2022 09:26 AM    CALCIUM 10.2 11/04/2022 09:26 AM    LABGLOM >60 11/04/2022 09:26 AM    GFRAA >60 01/27/2022 09:20 AM      Lab Results   Component Value Date/Time    TSH 1.370 07/09/2021 04:50 AM    T4FREE 1.16 07/09/2021 04:50 AM     Lab Results   Component Value Date/Time    LABA1C 7.4 11/04/2022 09:26 AM    GLUCOSE 184 11/04/2022 09:26 AM    MALBCR 3210.4 11/04/2022 09:24 AM    LABMICR 2407.8 11/04/2022 09:24 AM    LABCREA 75 11/04/2022 09:24 AM     Lab Results   Component Value Date/Time    LABA1C 7.4 11/04/2022 09:26 AM    LABA1C 7.1 04/19/2022 09:42 AM    LABA1C 6.7 07/09/2021 04:50 AM     Lab Results   Component Value Date/Time    TRIG 356 11/04/2022 09:26 AM    HDL 44 11/04/2022 09:26 AM    LDLCALC 61 11/04/2022 09:26 AM    CHOL 176 11/04/2022 09:26 AM Lab Results   Component Value Date/Time    VITD25 17 11/04/2022 09:26 AM    VITD25 11 01/27/2022 09:20 AM       ASSESSMENT & RECOMMENDATIONS   Kenisha Wu, a 63 y.o.-old male seen in for the following issues     Diabetes Mellitus Type 2     Under good control, A1c 7.4%   Continue Metformin 1000 mg bid , Jardiance 25 mg daily Glyburide 5 mg daily   The patient was advised to check blood sugars 1-2 times a day   Discussed with patient A1c and blood sugar goals  Patient will need routine diabetes maintenance and prevention  Diabetes labs before next visit     Dietary noncompliance  Discussed with patient the importance of eating consistent carbohydrate meals, avoiding high glycemic index food. Also, discussed with patient the risk and negative consequences of dietary noncompliance on blood glucose control, blood pressure and weight    vitD deficiency   vitD level was 1 in 1/2022. Started on weekly vitD supplement by his PCP     I personally reviewed external notes from PCP and other patient's care team providers, and personally interpreted labs associated with the above diagnosis. I also ordered labs to further assess and manage the above addressed medical conditions. Return in about 4 months (around 3/7/2023) for DM type 2, VitD deficiency. The above issues were reviewed with the patient who understood and agreed with the plan. Thank you for allowing us to participate in the care of this patient. Please do not hesitate to contact us with any additional questions. Diagnosis Orders   1. Vitamin D deficiency  vitamin D (CHOLECALCIFEROL) 39370 UNIT CAPS      2. Type 2 diabetes mellitus without complication, with long-term current use of insulin (HCC)  empagliflozin (JARDIANCE) 25 MG tablet    metFORMIN (GLUCOPHAGE) 1000 MG tablet      3.  Type 2 diabetes mellitus with diabetic nephropathy, without long-term current use of insulin (HCC)  glyBURIDE (DIABETA) 5 MG tablet          Romulo Abrex MD  Endocrinologist, [facility] Diabetes Care and Endocrinology   [address] [facility] [address]   Phone: [phone]  Fax: [phone]  --------------------------------------------  An electronic signature was used to authenticate this note.  Tray York MD on 11/7/2022 at 9:11 AM

## 2022-11-16 RX ORDER — POTASSIUM CITRATE 10 MEQ/1
10 TABLET, EXTENDED RELEASE ORAL 2 TIMES DAILY WITH MEALS
Qty: 60 TABLET | Refills: 3 | Status: SHIPPED | OUTPATIENT
Start: 2022-11-16

## 2022-12-05 RX ORDER — PRAVASTATIN SODIUM 40 MG
40 TABLET ORAL DAILY
Qty: 90 TABLET | Refills: 3 | Status: SHIPPED | OUTPATIENT
Start: 2022-12-05

## 2022-12-05 RX ORDER — ALLOPURINOL 100 MG/1
100 TABLET ORAL DAILY
Qty: 90 TABLET | Refills: 3 | Status: SHIPPED | OUTPATIENT
Start: 2022-12-05

## 2022-12-23 DIAGNOSIS — I10 ESSENTIAL HYPERTENSION: ICD-10-CM

## 2022-12-23 RX ORDER — METOPROLOL SUCCINATE 50 MG/1
50 TABLET, EXTENDED RELEASE ORAL 2 TIMES DAILY
Qty: 180 TABLET | Refills: 3 | Status: SHIPPED | OUTPATIENT
Start: 2022-12-23

## 2022-12-29 RX ORDER — AMLODIPINE BESYLATE 10 MG/1
10 TABLET ORAL DAILY
Qty: 90 TABLET | Refills: 1 | Status: SHIPPED | OUTPATIENT
Start: 2022-12-29

## 2022-12-29 NOTE — TELEPHONE ENCOUNTER
----- Message from Isidoro Rosa sent at 12/29/2022 12:09 PM EST -----  Subject: Refill Request    QUESTIONS  Name of Medication? amLODIPine (NORVASC) 10 MG tablet  Patient-reported dosage and instructions? 1x daily  How many days do you have left? 0  Preferred Pharmacy? [id number]  Pharmacy phone number (if available)? [phone]  ---------------------------------------------------------------------------  --------------  CALL BACK INFO  What is the best way for the office to contact you? OK to leave message on   voicemail  Preferred Call Back Phone Number? [phone]  ---------------------------------------------------------------------------  --------------  SCRIPT ANSWERS  Relationship to Patient?  Self

## 2023-01-16 DIAGNOSIS — E78.1 HYPERTRIGLYCERIDEMIA: ICD-10-CM

## 2023-01-16 RX ORDER — FENOFIBRATE 134 MG/1
134 CAPSULE ORAL
Qty: 90 CAPSULE | Refills: 3 | Status: SHIPPED | OUTPATIENT
Start: 2023-01-16

## 2023-01-17 ENCOUNTER — HOSPITAL ENCOUNTER (OUTPATIENT)
Age: 63
Discharge: HOME | End: 2023-01-17
Payer: COMMERCIAL

## 2023-01-17 LAB
ALBUMIN SERPL-MCNC: 4.5 G/DL (ref 3.5–5.2)
ALP BLD-CCNC: 64 U/L (ref 40–129)
ALT SERPL-CCNC: 43 U/L (ref 0–40)
ANION GAP SERPL CALCULATED.3IONS-SCNC: 14 MMOL/L (ref 7–16)
AST SERPL-CCNC: 31 U/L (ref 0–39)
BASOPHILS ABSOLUTE: 0.03 E9/L (ref 0–0.2)
BASOPHILS RELATIVE PERCENT: 0.7 % (ref 0–2)
BILIRUB SERPL-MCNC: 0.4 MG/DL (ref 0–1.2)
BUN BLDV-MCNC: 25 MG/DL (ref 6–23)
CALCIUM SERPL-MCNC: 9.8 MG/DL (ref 8.6–10.2)
CHLORIDE BLD-SCNC: 101 MMOL/L (ref 98–107)
CO2: 22 MMOL/L (ref 22–29)
CREAT SERPL-MCNC: 1 MG/DL (ref 0.7–1.2)
CREATININE URINE: 72 MG/DL (ref 40–278)
EOSINOPHILS ABSOLUTE: 0.14 E9/L (ref 0.05–0.5)
EOSINOPHILS RELATIVE PERCENT: 3.1 % (ref 0–6)
GFR SERPL CREATININE-BSD FRML MDRD: >60 ML/MIN/1.73
GLUCOSE BLD-MCNC: 203 MG/DL (ref 74–99)
HCT VFR BLD CALC: 42.2 % (ref 37–54)
HEMOGLOBIN: 14.2 G/DL (ref 12.5–16.5)
IMMATURE GRANULOCYTES #: 0.03 E9/L
IMMATURE GRANULOCYTES %: 0.7 % (ref 0–5)
LYMPHOCYTES ABSOLUTE: 0.94 E9/L (ref 1.5–4)
LYMPHOCYTES RELATIVE PERCENT: 20.8 % (ref 20–42)
MAGNESIUM: 2 MG/DL (ref 1.6–2.6)
MCH RBC QN AUTO: 28.8 PG (ref 26–35)
MCHC RBC AUTO-ENTMCNC: 33.6 % (ref 32–34.5)
MCV RBC AUTO: 85.6 FL (ref 80–99.9)
MONOCYTES ABSOLUTE: 0.54 E9/L (ref 0.1–0.95)
MONOCYTES RELATIVE PERCENT: 12 % (ref 2–12)
NEUTROPHILS ABSOLUTE: 2.83 E9/L (ref 1.8–7.3)
NEUTROPHILS RELATIVE PERCENT: 62.7 % (ref 43–80)
PDW BLD-RTO: 13.8 FL (ref 11.5–15)
PHOSPHORUS: 3.1 MG/DL (ref 2.5–4.5)
PLATELET # BLD: 134 E9/L (ref 130–450)
PMV BLD AUTO: 9.8 FL (ref 7–12)
POTASSIUM SERPL-SCNC: 4.3 MMOL/L (ref 3.5–5)
PROTEIN PROTEIN: 164 MG/DL (ref 0–12)
PROTEIN/CREAT RATIO: 2.3
PROTEIN/CREAT RATIO: 2.3 (ref 0–0.2)
RBC # BLD: 4.93 E12/L (ref 3.8–5.8)
SODIUM BLD-SCNC: 137 MMOL/L (ref 132–146)
TOTAL PROTEIN: 7.7 G/DL (ref 6.4–8.3)
URIC ACID, SERUM: 6.2 MG/DL (ref 3.4–7)
VITAMIN D 25-HYDROXY: 36 NG/ML (ref 30–100)
WBC # BLD: 4.5 E9/L (ref 4.5–11.5)

## 2023-01-17 PROCEDURE — 80053 COMPREHEN METABOLIC PANEL: CPT

## 2023-01-17 PROCEDURE — 83735 ASSAY OF MAGNESIUM: CPT

## 2023-01-17 PROCEDURE — 84100 ASSAY OF PHOSPHORUS: CPT

## 2023-01-17 PROCEDURE — 85025 COMPLETE CBC W/AUTO DIFF WBC: CPT

## 2023-01-17 PROCEDURE — 36415 COLL VENOUS BLD VENIPUNCTURE: CPT

## 2023-01-17 PROCEDURE — 84156 ASSAY OF PROTEIN URINE: CPT

## 2023-01-17 PROCEDURE — 84550 ASSAY OF BLOOD/URIC ACID: CPT

## 2023-01-17 PROCEDURE — 82306 VITAMIN D 25 HYDROXY: CPT

## 2023-01-17 PROCEDURE — 82570 ASSAY OF URINE CREATININE: CPT

## 2023-01-28 DIAGNOSIS — E55.9 VITAMIN D DEFICIENCY: ICD-10-CM

## 2023-01-30 DIAGNOSIS — F41.9 ANXIETY AND DEPRESSION: ICD-10-CM

## 2023-01-30 DIAGNOSIS — F32.A ANXIETY AND DEPRESSION: ICD-10-CM

## 2023-01-30 RX ORDER — SERTRALINE HYDROCHLORIDE 100 MG/1
TABLET, FILM COATED ORAL
Qty: 90 TABLET | Refills: 3 | Status: SHIPPED | OUTPATIENT
Start: 2023-01-30

## 2023-01-30 RX ORDER — METHOCARBAMOL 750 MG/1
TABLET ORAL
Qty: 12 CAPSULE | Refills: 0 | OUTPATIENT
Start: 2023-01-30

## 2023-02-02 DIAGNOSIS — Z79.4 TYPE 2 DIABETES MELLITUS WITHOUT COMPLICATION, WITH LONG-TERM CURRENT USE OF INSULIN (HCC): Primary | ICD-10-CM

## 2023-02-02 DIAGNOSIS — E11.9 TYPE 2 DIABETES MELLITUS WITHOUT COMPLICATION, WITH LONG-TERM CURRENT USE OF INSULIN (HCC): Primary | ICD-10-CM

## 2023-02-03 RX ORDER — BLOOD-GLUCOSE SENSOR
EACH MISCELLANEOUS
Qty: 6 EACH | Refills: 3 | Status: SHIPPED | OUTPATIENT
Start: 2023-02-03

## 2023-03-03 DIAGNOSIS — E55.9 VITAMIN D DEFICIENCY: ICD-10-CM

## 2023-03-06 RX ORDER — METHOCARBAMOL 750 MG/1
TABLET ORAL
Qty: 12 CAPSULE | Refills: 0 | OUTPATIENT
Start: 2023-03-06

## 2023-03-07 ENCOUNTER — OFFICE VISIT (OUTPATIENT)
Dept: ENDOCRINOLOGY | Age: 63
End: 2023-03-07
Payer: COMMERCIAL

## 2023-03-07 VITALS
BODY MASS INDEX: 33.43 KG/M2 | WEIGHT: 208 LBS | OXYGEN SATURATION: 99 % | RESPIRATION RATE: 18 BRPM | HEART RATE: 76 BPM | DIASTOLIC BLOOD PRESSURE: 97 MMHG | SYSTOLIC BLOOD PRESSURE: 160 MMHG | HEIGHT: 66 IN

## 2023-03-07 DIAGNOSIS — Z91.119 DIETARY NONCOMPLIANCE: ICD-10-CM

## 2023-03-07 DIAGNOSIS — E78.5 HYPERLIPIDEMIA, UNSPECIFIED HYPERLIPIDEMIA TYPE: ICD-10-CM

## 2023-03-07 DIAGNOSIS — E55.9 VITAMIN D DEFICIENCY: Primary | ICD-10-CM

## 2023-03-07 DIAGNOSIS — E11.21 TYPE 2 DIABETES MELLITUS WITH DIABETIC NEPHROPATHY, WITHOUT LONG-TERM CURRENT USE OF INSULIN (HCC): ICD-10-CM

## 2023-03-07 LAB — HBA1C MFR BLD: 6.9 %

## 2023-03-07 PROCEDURE — 3044F HG A1C LEVEL LT 7.0%: CPT | Performed by: INTERNAL MEDICINE

## 2023-03-07 PROCEDURE — 99214 OFFICE O/P EST MOD 30 MIN: CPT | Performed by: INTERNAL MEDICINE

## 2023-03-07 PROCEDURE — 3080F DIAST BP >= 90 MM HG: CPT | Performed by: INTERNAL MEDICINE

## 2023-03-07 PROCEDURE — 3077F SYST BP >= 140 MM HG: CPT | Performed by: INTERNAL MEDICINE

## 2023-03-07 PROCEDURE — 83036 HEMOGLOBIN GLYCOSYLATED A1C: CPT | Performed by: INTERNAL MEDICINE

## 2023-03-07 RX ORDER — GLYBURIDE 5 MG/1
5 TABLET ORAL DAILY
Qty: 90 TABLET | Refills: 3 | Status: SHIPPED | OUTPATIENT
Start: 2023-03-07

## 2023-03-07 NOTE — PROGRESS NOTES
[address] [place] [address] Department of Endocrinology Diabetes and Metabolism   [address] [facility] [address]   Phone: [phone]  Fax: [phone]    Date of Service: 3/7/2023  Primary Care Physician: Gemma Garvin MD  Provider: Armida Koenig MD     Reason for the visit:  Poorly controlled DM type 2     History of Present Illness: The history is provided by the patient. No  was used. Accuracy of the patient data is excellent.   Espinoza Mckenzie is a very pleasant 66 y.o. male seen today for diabetes management     Espinoza Mckenzie was diagnosed with diabetes around 20 years ago and currently on Metformin 1000 mg bid, Jardiance 25 mg daily Glyburide 5 mg daily   The patient has been checking blood sugar 1-2 times/day and most readings at goal   Most recent A1c results summarized below  Lab Results   Component Value Date/Time    LABA1C 6.9 03/07/2023 08:50 AM    LABA1C 7.4 11/04/2022 09:26 AM    LABA1C 7.1 04/19/2022 09:42 AM     Patient has had no hypoglycemic episodes   The patient has been mindful of what has been eating and following diabetic diet as encouraged  I reviewed current medications and the patient has no issues with diabetes medications  Espinoza Mckenzie is up to date with eye exam and denied any history of diabetic retinopathy   The patient performs his own feet care  Microvascular complications:  No Retinopathy, Nephropathy or Neuropathy   Macrovascular complications: no CAD, PVD, or Stroke    PAST MEDICAL HISTORY   Past Medical History:   Diagnosis Date    Anxiety     Appendicitis 01/17/2020    Cellulitis 07/10/2021    Class 1 obesity due to excess calories in adult 12/03/2020    Depression     Diabetes mellitus ([facility] [address] 2005    Diabetic nephropathy associated with type 2 diabetes mellitus ([address] 12/03/2020    Gastroesophageal reflux disease without esophagitis 12/03/2020    Grade II diastolic dysfunction 58/8893    Hepatic steatosis 12/03/2020 Hyperlipidemia     Hypertension 2005    Infective myositis of right upper extremity 07/08/2021    Kidney stones     X2    Mesenteric hematoma 01/17/2020    Mixed hyperlipidemia 05/13/2021    Proteinuria     Stage 3a chronic kidney disease ([address] 12/03/2020    Type 2 diabetes mellitus without complication (HCC)     Vitamin D deficiency 05/18/2021       PAST SURGICAL HISTORY   Past Surgical History:   Procedure Laterality Date    COLONOSCOPY  11/04/2019    Dr. Perkins Longest Tubular adenoma; diverticulosis    LAPAROSCOPIC APPENDECTOMY N/A 1/17/2020    APPENDECTOMY LAPAROSCOPIC, EXPLORATORY LAPAROTOMY performed by Gracie Zhneg MD at [address]   Tobacco:   reports that he has never smoked. He has never used smokeless tobacco.  Alcohol:   reports no history of alcohol use. Drugs:   reports no history of drug use.     FAMILY HISTORY   Family History   Problem Relation Age of Onset    Other Mother         Alive age 80 26; minimal arthritis    Arthritis Mother     Atrial Fibrillation Mother     Liver Disease Mother     Diabetes Father         Alive age 80 2020    Hypertension Father     High Blood Pressure Father     Heart Surgery Father        ALLERGIES AND DRUG REACTIONS   No Known Allergies    CURRENT MEDICATIONS   Current Outpatient Medications   Medication Sig Dispense Refill    glyBURIDE (DIABETA) 5 MG tablet Take 1 tablet by mouth daily 90 tablet 3    metFORMIN (GLUCOPHAGE) 1000 MG tablet Take 1 tablet twice a day 180 tablet 3    empagliflozin (JARDIANCE) 25 MG tablet TAKE 1 TABLET BY MOUTH DAILY 90 tablet 3    Continuous Blood Gluc Sensor (FREESTYLE ROCIO 3 SENSOR) MISC To change every 14 days 6 each 3    sertraline (ZOLOFT) 100 MG tablet TAKE 1 TABLET BY MOUTH EVERY DAY 90 tablet 3    fenofibrate micronized (LOFIBRA) 134 MG capsule Take 1 capsule by mouth every morning (before breakfast) 90 capsule 3    amLODIPine (NORVASC) 10 MG tablet Take 1 tablet by mouth daily 90 tablet 1    metoprolol succinate (TOPROL XL) 50 MG extended release tablet Take 1 tablet by mouth in the morning and 1 tablet in the evening. 180 tablet 3    allopurinol (ZYLOPRIM) 100 MG tablet Take 1 tablet by mouth daily 90 tablet 3    pravastatin (PRAVACHOL) 40 MG tablet Take 1 tablet by mouth daily 90 tablet 3    potassium citrate (UROCIT-K) 10 MEQ (1080 MG) extended release tablet Take 1 tablet by mouth 2 times daily (with meals) 60 tablet 3    vitamin D (CHOLECALCIFEROL) 93908 UNIT CAPS Take 1 capsule weekly for 12 weeks only. No refills 12 capsule 0    losartan (COZAAR) 100 MG tablet Take 1 tablet by mouth in the morning. 90 tablet 3    tamsulosin (FLOMAX) 0.4 MG capsule Take 1 capsule by mouth daily 90 capsule 3    aspirin 81 MG tablet Take 81 mg by mouth daily       No current facility-administered medications for this visit. Review of Systems  Constitutional: No fever, no chills, no diaphoresis, no generalized weakness. HEENT: No blurred vision, No sore throat, no ear pain, no hair loss  Neck: denied any neck swelling, difficulty swallowing,   Cardio-pulmonary: No CP, SOB or palpitation, No orthopnea or PND. No cough or wheezing. GI: No N/V/D, no constipation, No abdominal pain, no melena or hematochezia   : Denied any dysuria, hematuria, flank pain, discharge, or incontinence. Skin: denied any rash, ulcer, Hirsute, or hyperpigmentation. MSK: denied any joint deformity, joint pain/swelling, muscle pain, or back pain.   Neuro: no numbness, no tingling, no weakness, _    OBJECTIVE    BP (!) 160/97   Pulse 76   Resp 18   Ht 5' 6\" (1.676 m)   Wt 208 lb (94.3 kg)   SpO2 99%   BMI 33.57 kg/m²   BP Readings from Last 4 Encounters:   03/07/23 (!) 160/97   11/07/22 (!) 146/93   08/01/22 128/78   06/08/22 130/80     Wt Readings from Last 6 Encounters:   03/07/23 208 lb (94.3 kg)   11/07/22 209 lb (94.8 kg)   08/01/22 207 lb (93.9 kg)   06/07/22 209 lb (94.8 kg)   04/19/22 209 lb 9.6 oz (95.1 kg)   01/31/22 208 lb (94.3 kg) Physical examination:  General: awake alert, oriented x3, no abnormal position or movements. HEENT: normocephalic non-traumatic, no exophthalmos   Neck: supple, no LN enlargement, no thyromegaly, no thyroid tenderness, no JVD. Pulm: Clear equal air entry no added sounds, no wheezing or rhonchi    CVS: S1 + S2, no murmur, no heave. Dorsalis pedis pulse palpable   Abd: soft lax, no tenderness, no organomegaly, audible bowel sounds. Skin: warm, no lesions, no rash.  No callus, no Ulcers, No acanthosis nigricans  Musculoskeletal: No back tenderness, no kyphosis/scoliosis    Neuro: CN intact, Monofilament sensation decreased bilateral , muscle power normal  Psych: normal mood, and affect    Review of Laboratory Data:  I personally reviewed the following lab:  Lab Results   Component Value Date/Time    WBC 4.5 01/17/2023 09:50 AM    RBC 4.93 01/17/2023 09:50 AM    HGB 14.2 01/17/2023 09:50 AM    HCT 42.2 01/17/2023 09:50 AM    MCV 85.6 01/17/2023 09:50 AM    MCH 28.8 01/17/2023 09:50 AM    MCHC 33.6 01/17/2023 09:50 AM    RDW 13.8 01/17/2023 09:50 AM     01/17/2023 09:50 AM    MPV 9.8 01/17/2023 09:50 AM      Lab Results   Component Value Date/Time     01/17/2023 09:50 AM    K 4.3 01/17/2023 09:50 AM    K 4.2 07/08/2021 04:19 PM    CO2 22 01/17/2023 09:50 AM    BUN 25 (H) 01/17/2023 09:50 AM    CREATININE 1.0 01/17/2023 09:50 AM    CALCIUM 9.8 01/17/2023 09:50 AM    LABGLOM >60 01/17/2023 09:50 AM    GFRAA >60 01/27/2022 09:20 AM      Lab Results   Component Value Date/Time    TSH 1.370 07/09/2021 04:50 AM    T4FREE 1.16 07/09/2021 04:50 AM     Lab Results   Component Value Date/Time    LABA1C 6.9 03/07/2023 08:50 AM    GLUCOSE 203 01/17/2023 09:50 AM    MALBCR 3210.4 11/04/2022 09:24 AM    LABMICR 2407.8 11/04/2022 09:24 AM    LABCREA 72 01/17/2023 10:00 AM     Lab Results   Component Value Date/Time    LABA1C 6.9 03/07/2023 08:50 AM    LABA1C 7.4 11/04/2022 09:26 AM    LABA1C 7.1 04/19/2022 09:42 AM Lab Results   Component Value Date/Time    TRIG 356 11/04/2022 09:26 AM    HDL 44 11/04/2022 09:26 AM    LDLCALC 61 11/04/2022 09:26 AM    CHOL 176 11/04/2022 09:26 AM     Lab Results   Component Value Date/Time    VITD25 36 01/17/2023 09:50 AM    VITD25 17 11/04/2022 09:26 AM       ASSESSMENT & RECOMMENDATIONS   Saul Siddiqui, a 66 y.o.-old male seen in for the following issues     Diabetes Mellitus Type 2      Under good control, A1c 6.9%    Continue Metformin 1000 mg bid, Jardiance 25 mg daily Glyburide 5 mg daily   Continue checking blood sugars 1-2 times a day   Discussed with patient A1c and blood sugar goals  Patient will need routine diabetes maintenance and prevention  A1c at next OV     Dietary noncompliance  Improving   Discussed with patient the importance of eating consistent carbohydrate meals, avoiding high glycemic index food. Also, discussed with patient the risk and negative consequences of dietary noncompliance on blood glucose control, blood pressure and weight    HLD  Pravastatin 40 mg daily     vitD deficiency   vitD level was 1 in 1/2022. Started on weekly vitD supplement by his PCP     I personally reviewed external notes from PCP and other patient's care team providers, and personally interpreted labs associated with the above diagnosis. I also ordered labs to further assess and manage the above addressed medical conditions. Return in about 4 months (around 7/7/2023) for DM type 2, VitD deficiency. The above issues were reviewed with the patient who understood and agreed with the plan. Thank you for allowing us to participate in the care of this patient. Please do not hesitate to contact us with any additional questions. Diagnosis Orders   1. Vitamin D deficiency        2.  Type 2 diabetes mellitus with diabetic nephropathy, without long-term current use of insulin (HCC)  POCT glycosylated hemoglobin (Hb A1C)    glyBURIDE (DIABETA) 5 MG tablet    metFORMIN (GLUCOPHAGE) 1000 MG tablet    empagliflozin (JARDIANCE) 25 MG tablet    Hemoglobin A1C      3. Hyperlipidemia, unspecified hyperlipidemia type        4. Dietary noncompliance            Jasper Oreilly MD  Endocrinologist, [facility] Diabetes Care and Endocrinology   [address] [facility] [address]   Phone: [phone]  Fax: [phone]  --------------------------------------------  An electronic signature was used to authenticate this note.  Roman Ruelas MD on 3/7/2023 at 9:50 AM

## 2023-04-17 DIAGNOSIS — E55.9 VITAMIN D DEFICIENCY: ICD-10-CM

## 2023-04-17 RX ORDER — METHOCARBAMOL 750 MG/1
TABLET ORAL
Qty: 12 CAPSULE | Refills: 0 | OUTPATIENT
Start: 2023-04-17

## 2023-05-01 SDOH — ECONOMIC STABILITY: TRANSPORTATION INSECURITY
IN THE PAST 12 MONTHS, HAS LACK OF TRANSPORTATION KEPT YOU FROM MEETINGS, WORK, OR FROM GETTING THINGS NEEDED FOR DAILY LIVING?: NO

## 2023-05-01 SDOH — ECONOMIC STABILITY: INCOME INSECURITY: HOW HARD IS IT FOR YOU TO PAY FOR THE VERY BASICS LIKE FOOD, HOUSING, MEDICAL CARE, AND HEATING?: NOT HARD AT ALL

## 2023-05-01 SDOH — ECONOMIC STABILITY: FOOD INSECURITY: WITHIN THE PAST 12 MONTHS, YOU WORRIED THAT YOUR FOOD WOULD RUN OUT BEFORE YOU GOT MONEY TO BUY MORE.: NEVER TRUE

## 2023-05-01 SDOH — ECONOMIC STABILITY: FOOD INSECURITY: WITHIN THE PAST 12 MONTHS, THE FOOD YOU BOUGHT JUST DIDN'T LAST AND YOU DIDN'T HAVE MONEY TO GET MORE.: NEVER TRUE

## 2023-05-01 SDOH — ECONOMIC STABILITY: HOUSING INSECURITY
IN THE LAST 12 MONTHS, WAS THERE A TIME WHEN YOU DID NOT HAVE A STEADY PLACE TO SLEEP OR SLEPT IN A SHELTER (INCLUDING NOW)?: NO

## 2023-05-02 ENCOUNTER — OFFICE VISIT (OUTPATIENT)
Dept: PRIMARY CARE CLINIC | Age: 63
End: 2023-05-02
Payer: COMMERCIAL

## 2023-05-02 VITALS
BODY MASS INDEX: 33.75 KG/M2 | DIASTOLIC BLOOD PRESSURE: 80 MMHG | WEIGHT: 210 LBS | OXYGEN SATURATION: 96 % | SYSTOLIC BLOOD PRESSURE: 118 MMHG | HEIGHT: 66 IN | RESPIRATION RATE: 18 BRPM | HEART RATE: 83 BPM | TEMPERATURE: 97.3 F

## 2023-05-02 DIAGNOSIS — M54.50 BILATERAL LOW BACK PAIN WITHOUT SCIATICA, UNSPECIFIED CHRONICITY: Primary | ICD-10-CM

## 2023-05-02 DIAGNOSIS — I10 ESSENTIAL HYPERTENSION: ICD-10-CM

## 2023-05-02 DIAGNOSIS — K76.0 HEPATIC STEATOSIS: ICD-10-CM

## 2023-05-02 DIAGNOSIS — E78.2 MIXED HYPERLIPIDEMIA: ICD-10-CM

## 2023-05-02 DIAGNOSIS — Z87.442 HISTORY OF KIDNEY STONES: ICD-10-CM

## 2023-05-02 DIAGNOSIS — E66.09 CLASS 1 OBESITY DUE TO EXCESS CALORIES WITH SERIOUS COMORBIDITY AND BODY MASS INDEX (BMI) OF 33.0 TO 33.9 IN ADULT: ICD-10-CM

## 2023-05-02 DIAGNOSIS — M54.9 BACK PAIN, UNSPECIFIED BACK LOCATION, UNSPECIFIED BACK PAIN LATERALITY, UNSPECIFIED CHRONICITY: ICD-10-CM

## 2023-05-02 LAB
ALBUMIN SERPL-MCNC: 4.7 G/DL (ref 3.5–5.2)
ALP SERPL-CCNC: 79 U/L (ref 40–129)
ALT SERPL-CCNC: 69 U/L (ref 0–40)
ANION GAP SERPL CALCULATED.3IONS-SCNC: 10 MMOL/L (ref 7–16)
AST SERPL-CCNC: 44 U/L (ref 0–39)
BASOPHILS # BLD: 0.04 E9/L (ref 0–0.2)
BASOPHILS NFR BLD: 0.8 % (ref 0–2)
BILIRUB SERPL-MCNC: 0.4 MG/DL (ref 0–1.2)
BILIRUBIN, POC: NEGATIVE
BLOOD URINE, POC: NORMAL
BUN SERPL-MCNC: 28 MG/DL (ref 6–23)
CALCIUM SERPL-MCNC: 9.9 MG/DL (ref 8.6–10.2)
CHLORIDE SERPL-SCNC: 101 MMOL/L (ref 98–107)
CLARITY, POC: CLEAR
CO2 SERPL-SCNC: 25 MMOL/L (ref 22–29)
COLOR, POC: YELLOW
CREAT SERPL-MCNC: 1.1 MG/DL (ref 0.7–1.2)
EOSINOPHIL # BLD: 0.15 E9/L (ref 0.05–0.5)
EOSINOPHIL NFR BLD: 3.2 % (ref 0–6)
ERYTHROCYTE [DISTWIDTH] IN BLOOD BY AUTOMATED COUNT: 13.8 FL (ref 11.5–15)
GLUCOSE SERPL-MCNC: 250 MG/DL (ref 74–99)
GLUCOSE URINE, POC: NORMAL
HCT VFR BLD AUTO: 46.5 % (ref 37–54)
HGB BLD-MCNC: 15 G/DL (ref 12.5–16.5)
IMM GRANULOCYTES # BLD: 0.02 E9/L
IMM GRANULOCYTES NFR BLD: 0.4 % (ref 0–5)
KETONES, POC: NEGATIVE
LEUKOCYTE EST, POC: NEGATIVE
LYMPHOCYTES # BLD: 0.88 E9/L (ref 1.5–4)
LYMPHOCYTES NFR BLD: 18.6 % (ref 20–42)
MCH RBC QN AUTO: 28.7 PG (ref 26–35)
MCHC RBC AUTO-ENTMCNC: 32.3 % (ref 32–34.5)
MCV RBC AUTO: 89.1 FL (ref 80–99.9)
MONOCYTES # BLD: 0.36 E9/L (ref 0.1–0.95)
MONOCYTES NFR BLD: 7.6 % (ref 2–12)
NEUTROPHILS # BLD: 3.27 E9/L (ref 1.8–7.3)
NEUTS SEG NFR BLD: 69.4 % (ref 43–80)
NITRITE, POC: NEGATIVE
PH, POC: 5.5
PLATELET # BLD AUTO: 148 E9/L (ref 130–450)
PMV BLD AUTO: 10.2 FL (ref 7–12)
POTASSIUM SERPL-SCNC: 4.5 MMOL/L (ref 3.5–5)
PROT SERPL-MCNC: 7.3 G/DL (ref 6.4–8.3)
PROTEIN, POC: NORMAL
RBC # BLD AUTO: 5.22 E12/L (ref 3.8–5.8)
SODIUM SERPL-SCNC: 136 MMOL/L (ref 132–146)
SPECIFIC GRAVITY, POC: 1.02
UROBILINOGEN, POC: NORMAL
WBC # BLD: 4.7 E9/L (ref 4.5–11.5)

## 2023-05-02 PROCEDURE — 3078F DIAST BP <80 MM HG: CPT | Performed by: INTERNAL MEDICINE

## 2023-05-02 PROCEDURE — 3074F SYST BP LT 130 MM HG: CPT | Performed by: INTERNAL MEDICINE

## 2023-05-02 PROCEDURE — 81002 URINALYSIS NONAUTO W/O SCOPE: CPT | Performed by: INTERNAL MEDICINE

## 2023-05-02 PROCEDURE — 99214 OFFICE O/P EST MOD 30 MIN: CPT | Performed by: INTERNAL MEDICINE

## 2023-05-02 SDOH — ECONOMIC STABILITY: FOOD INSECURITY: WITHIN THE PAST 12 MONTHS, THE FOOD YOU BOUGHT JUST DIDN'T LAST AND YOU DIDN'T HAVE MONEY TO GET MORE.: NEVER TRUE

## 2023-05-02 SDOH — ECONOMIC STABILITY: INCOME INSECURITY: HOW HARD IS IT FOR YOU TO PAY FOR THE VERY BASICS LIKE FOOD, HOUSING, MEDICAL CARE, AND HEATING?: NOT HARD AT ALL

## 2023-05-02 SDOH — ECONOMIC STABILITY: FOOD INSECURITY: WITHIN THE PAST 12 MONTHS, YOU WORRIED THAT YOUR FOOD WOULD RUN OUT BEFORE YOU GOT MONEY TO BUY MORE.: NEVER TRUE

## 2023-05-02 ASSESSMENT — PATIENT HEALTH QUESTIONNAIRE - PHQ9
4. FEELING TIRED OR HAVING LITTLE ENERGY: 0
3. TROUBLE FALLING OR STAYING ASLEEP: 0
8. MOVING OR SPEAKING SO SLOWLY THAT OTHER PEOPLE COULD HAVE NOTICED. OR THE OPPOSITE, BEING SO FIGETY OR RESTLESS THAT YOU HAVE BEEN MOVING AROUND A LOT MORE THAN USUAL: 0
SUM OF ALL RESPONSES TO PHQ9 QUESTIONS 1 & 2: 0
1. LITTLE INTEREST OR PLEASURE IN DOING THINGS: 0
SUM OF ALL RESPONSES TO PHQ QUESTIONS 1-9: 0
5. POOR APPETITE OR OVEREATING: 0
7. TROUBLE CONCENTRATING ON THINGS, SUCH AS READING THE NEWSPAPER OR WATCHING TELEVISION: 0
9. THOUGHTS THAT YOU WOULD BE BETTER OFF DEAD, OR OF HURTING YOURSELF: 0
2. FEELING DOWN, DEPRESSED OR HOPELESS: 0
6. FEELING BAD ABOUT YOURSELF - OR THAT YOU ARE A FAILURE OR HAVE LET YOURSELF OR YOUR FAMILY DOWN: 0
10. IF YOU CHECKED OFF ANY PROBLEMS, HOW DIFFICULT HAVE THESE PROBLEMS MADE IT FOR YOU TO DO YOUR WORK, TAKE CARE OF THINGS AT HOME, OR GET ALONG WITH OTHER PEOPLE: 0
SUM OF ALL RESPONSES TO PHQ QUESTIONS 1-9: 0

## 2023-05-02 NOTE — PROGRESS NOTES
Nya Jones  5/2/23     Chief Complaint   Patient presents with    Back Pain        No Known Allergies     Current Outpatient Medications   Medication Sig Dispense Refill    glyBURIDE (DIABETA) 5 MG tablet Take 1 tablet by mouth daily 90 tablet 3    metFORMIN (GLUCOPHAGE) 1000 MG tablet Take 1 tablet twice a day 180 tablet 3    empagliflozin (JARDIANCE) 25 MG tablet TAKE 1 TABLET BY MOUTH DAILY 90 tablet 3    Continuous Blood Gluc Sensor (FREESTYLE ROCIO 3 SENSOR) MISC To change every 14 days 6 each 3    sertraline (ZOLOFT) 100 MG tablet TAKE 1 TABLET BY MOUTH EVERY DAY 90 tablet 3    fenofibrate micronized (LOFIBRA) 134 MG capsule Take 1 capsule by mouth every morning (before breakfast) 90 capsule 3    amLODIPine (NORVASC) 10 MG tablet Take 1 tablet by mouth daily 90 tablet 1    metoprolol succinate (TOPROL XL) 50 MG extended release tablet Take 1 tablet by mouth in the morning and 1 tablet in the evening. 180 tablet 3    allopurinol (ZYLOPRIM) 100 MG tablet Take 1 tablet by mouth daily 90 tablet 3    pravastatin (PRAVACHOL) 40 MG tablet Take 1 tablet by mouth daily 90 tablet 3    potassium citrate (UROCIT-K) 10 MEQ (1080 MG) extended release tablet Take 1 tablet by mouth 2 times daily (with meals) 60 tablet 3    losartan (COZAAR) 100 MG tablet Take 1 tablet by mouth in the morning. 90 tablet 3    tamsulosin (FLOMAX) 0.4 MG capsule Take 1 capsule by mouth daily 90 capsule 3    aspirin 81 MG tablet Take 1 tablet by mouth daily       No current facility-administered medications for this visit. HPI: Patient comes in complaining of occasional low back pain generally across his lower back without radiation down either leg. He was concerned about the possibility of kidney stones, which she has had in the past.  He states his current discomfort does not feel like kidney stones compared to what he has experienced in the past.  He denies any fever or chills. He denies any GI or  complaints.   He remains on

## 2023-05-07 PROBLEM — E66.811 CLASS 1 OBESITY DUE TO EXCESS CALORIES IN ADULT: Status: RESOLVED | Noted: 2020-12-03 | Resolved: 2023-05-07

## 2023-05-07 PROBLEM — E66.09 CLASS 1 OBESITY DUE TO EXCESS CALORIES IN ADULT: Status: RESOLVED | Noted: 2020-12-03 | Resolved: 2023-05-07

## 2023-05-07 PROBLEM — E66.811 CLASS 1 OBESITY DUE TO EXCESS CALORIES WITH SERIOUS COMORBIDITY AND BODY MASS INDEX (BMI) OF 33.0 TO 33.9 IN ADULT: Status: ACTIVE | Noted: 2023-05-07

## 2023-05-07 PROBLEM — E78.5 HYPERLIPIDEMIA: Status: RESOLVED | Noted: 2023-03-07 | Resolved: 2023-05-07

## 2023-05-07 PROBLEM — E66.09 CLASS 1 OBESITY DUE TO EXCESS CALORIES WITH SERIOUS COMORBIDITY AND BODY MASS INDEX (BMI) OF 33.0 TO 33.9 IN ADULT: Status: ACTIVE | Noted: 2023-05-07

## 2023-05-15 RX ORDER — POTASSIUM CITRATE 10 MEQ/1
TABLET, EXTENDED RELEASE ORAL
Qty: 60 TABLET | Refills: 3 | Status: SHIPPED | OUTPATIENT
Start: 2023-05-15

## 2023-06-27 ENCOUNTER — TELEPHONE (OUTPATIENT)
Dept: ENDOCRINOLOGY | Age: 63
End: 2023-06-27

## 2023-08-01 RX ORDER — LOSARTAN POTASSIUM 100 MG/1
TABLET ORAL
Qty: 90 TABLET | Refills: 3 | Status: SHIPPED | OUTPATIENT
Start: 2023-08-01

## 2023-12-05 RX ORDER — POTASSIUM CITRATE 10 MEQ/1
TABLET, EXTENDED RELEASE ORAL
Qty: 60 TABLET | Refills: 3 | Status: SHIPPED | OUTPATIENT
Start: 2023-12-05

## 2023-12-08 RX ORDER — PRAVASTATIN SODIUM 40 MG
40 TABLET ORAL DAILY
Qty: 90 TABLET | Refills: 3 | Status: SHIPPED | OUTPATIENT
Start: 2023-12-08

## 2023-12-08 RX ORDER — ALLOPURINOL 100 MG/1
100 TABLET ORAL DAILY
Qty: 90 TABLET | Refills: 3 | Status: SHIPPED | OUTPATIENT
Start: 2023-12-08

## 2023-12-28 DIAGNOSIS — E11.21 TYPE 2 DIABETES MELLITUS WITH DIABETIC NEPHROPATHY, WITHOUT LONG-TERM CURRENT USE OF INSULIN (HCC): ICD-10-CM

## 2023-12-28 RX ORDER — GLYBURIDE 5 MG/1
5 TABLET ORAL DAILY
Qty: 90 TABLET | Refills: 0 | Status: SHIPPED | OUTPATIENT
Start: 2023-12-28

## 2023-12-31 DIAGNOSIS — E78.1 HYPERTRIGLYCERIDEMIA: ICD-10-CM

## 2024-01-02 ENCOUNTER — OFFICE VISIT (OUTPATIENT)
Dept: FAMILY MEDICINE CLINIC | Age: 64
End: 2024-01-02
Payer: COMMERCIAL

## 2024-01-02 ENCOUNTER — TELEPHONE (OUTPATIENT)
Dept: PRIMARY CARE CLINIC | Age: 64
End: 2024-01-02

## 2024-01-02 VITALS
HEART RATE: 92 BPM | OXYGEN SATURATION: 98 % | BODY MASS INDEX: 33.11 KG/M2 | TEMPERATURE: 97.6 F | SYSTOLIC BLOOD PRESSURE: 118 MMHG | WEIGHT: 206 LBS | HEIGHT: 66 IN | DIASTOLIC BLOOD PRESSURE: 70 MMHG

## 2024-01-02 DIAGNOSIS — U07.1 COVID-19: Primary | ICD-10-CM

## 2024-01-02 DIAGNOSIS — R05.9 COUGH, UNSPECIFIED TYPE: ICD-10-CM

## 2024-01-02 LAB
INFLUENZA A ANTIBODY: NEGATIVE
INFLUENZA B ANTIBODY: NEGATIVE
Lab: ABNORMAL
PERFORMING INSTRUMENT: ABNORMAL
QC PASS/FAIL: ABNORMAL
S PYO AG THROAT QL: NORMAL
SARS-COV-2, POC: DETECTED

## 2024-01-02 PROCEDURE — 87426 SARSCOV CORONAVIRUS AG IA: CPT

## 2024-01-02 PROCEDURE — 3078F DIAST BP <80 MM HG: CPT

## 2024-01-02 PROCEDURE — 99213 OFFICE O/P EST LOW 20 MIN: CPT

## 2024-01-02 PROCEDURE — 87804 INFLUENZA ASSAY W/OPTIC: CPT

## 2024-01-02 PROCEDURE — 3074F SYST BP LT 130 MM HG: CPT

## 2024-01-02 PROCEDURE — 87880 STREP A ASSAY W/OPTIC: CPT

## 2024-01-02 RX ORDER — FENOFIBRATE 134 MG/1
134 CAPSULE ORAL
Qty: 90 CAPSULE | Refills: 3 | Status: SHIPPED | OUTPATIENT
Start: 2024-01-02

## 2024-01-02 NOTE — TELEPHONE ENCOUNTER
Pt states he is having body aches, fatigue, stuffy nose, sore throat, headache, and fever. Dr. Ramirez wants him to go to express care.    Pt notified.

## 2024-01-04 ENCOUNTER — TELEPHONE (OUTPATIENT)
Dept: PRIMARY CARE CLINIC | Age: 64
End: 2024-01-04

## 2024-01-04 DIAGNOSIS — J06.9 UPPER RESPIRATORY TRACT INFECTION, UNSPECIFIED TYPE: Primary | ICD-10-CM

## 2024-01-04 RX ORDER — AZITHROMYCIN 250 MG/1
250 TABLET, FILM COATED ORAL SEE ADMIN INSTRUCTIONS
Qty: 6 TABLET | Refills: 0 | Status: SHIPPED | OUTPATIENT
Start: 2024-01-04 | End: 2024-01-09

## 2024-01-04 NOTE — TELEPHONE ENCOUNTER
----- Message from Lynnette Martinez sent at 1/4/2024  1:19 PM EST -----  Subject: Message to Provider    QUESTIONS  Information for Provider? Pt has covid and is wanting to be prescribed an   antibiotic for the covid. If the practice could give the pt a call to   advise.  ---------------------------------------------------------------------------  --------------  CALL BACK INFO  [phone]; OK to leave message on voicemail  ---------------------------------------------------------------------------  --------------  SCRIPT ANSWERS  Relationship to Patient? Self

## 2024-01-04 NOTE — TELEPHONE ENCOUNTER
I sent a prescription for a Z-Sammy to his pharmacy.  Also he may take sugar-free Robitussin DM as needed for his cough and congestion and try to stay hydrated.  Take Tylenol for any body aches or low-grade fever.  Call if his symptoms do not improve over the next few days.

## 2024-01-04 NOTE — TELEPHONE ENCOUNTER
PT CALLING HE WENT TO [facility] AND WAS TOLD HE HAD COVID TO USE OTC MEDS AND F/U W PCP IF NOT BETTER. PT STATES HIS FEVER AND BODY ACHES ARE BETTER, BUT HE IS STILL HAVING COUGH AND CONGESTION WITH YELLOW /GREEN PND /PHLEGM SO HE IS REQUESTING ANTIBIOTIC AND COUGH MED TO [facility]     ADVISE

## 2024-01-16 DIAGNOSIS — I10 ESSENTIAL HYPERTENSION: ICD-10-CM

## 2024-01-16 RX ORDER — METOPROLOL SUCCINATE 50 MG/1
TABLET, EXTENDED RELEASE ORAL
Qty: 180 TABLET | Refills: 3 | Status: SHIPPED | OUTPATIENT
Start: 2024-01-16

## 2024-01-30 DIAGNOSIS — F32.A ANXIETY AND DEPRESSION: ICD-10-CM

## 2024-01-30 DIAGNOSIS — F41.9 ANXIETY AND DEPRESSION: ICD-10-CM

## 2024-01-30 RX ORDER — SERTRALINE HYDROCHLORIDE 100 MG/1
TABLET, FILM COATED ORAL
Qty: 90 TABLET | Refills: 3 | Status: SHIPPED | OUTPATIENT
Start: 2024-01-30

## 2024-02-28 ENCOUNTER — OFFICE VISIT (OUTPATIENT)
Dept: CARDIOLOGY CLINIC | Age: 64
End: 2024-02-28

## 2024-02-28 VITALS
HEART RATE: 78 BPM | HEIGHT: 66 IN | SYSTOLIC BLOOD PRESSURE: 138 MMHG | WEIGHT: 204 LBS | BODY MASS INDEX: 32.78 KG/M2 | DIASTOLIC BLOOD PRESSURE: 70 MMHG

## 2024-02-28 DIAGNOSIS — R74.01 ELEVATED ALT MEASUREMENT: ICD-10-CM

## 2024-02-28 DIAGNOSIS — R74.01 ELEVATED AST (SGOT): ICD-10-CM

## 2024-02-28 DIAGNOSIS — E11.8 DM (DIABETES MELLITUS), TYPE 2 WITH COMPLICATIONS (HCC): Primary | ICD-10-CM

## 2024-02-28 DIAGNOSIS — Z79.899 ON STATIN THERAPY: ICD-10-CM

## 2024-02-28 DIAGNOSIS — Z87.442 HISTORY OF KIDNEY STONES: ICD-10-CM

## 2024-02-28 DIAGNOSIS — F41.9 ANXIETY AND DEPRESSION: ICD-10-CM

## 2024-02-28 DIAGNOSIS — E78.2 MIXED HYPERLIPIDEMIA: ICD-10-CM

## 2024-02-28 DIAGNOSIS — E55.9 VITAMIN D DEFICIENCY: ICD-10-CM

## 2024-02-28 DIAGNOSIS — E66.9 MILD OBESITY: ICD-10-CM

## 2024-02-28 DIAGNOSIS — R80.9 PROTEINURIA, UNSPECIFIED TYPE: ICD-10-CM

## 2024-02-28 DIAGNOSIS — N18.2 CKD (CHRONIC KIDNEY DISEASE) STAGE 2, GFR 60-89 ML/MIN: ICD-10-CM

## 2024-02-28 DIAGNOSIS — K76.0 HEPATIC STEATOSIS: ICD-10-CM

## 2024-02-28 DIAGNOSIS — I10 ESSENTIAL HYPERTENSION: ICD-10-CM

## 2024-02-28 DIAGNOSIS — F32.A ANXIETY AND DEPRESSION: ICD-10-CM

## 2024-02-28 DIAGNOSIS — E11.42 DIABETIC PERIPHERAL NEUROPATHY (HCC): ICD-10-CM

## 2024-02-28 RX ORDER — ERGOCALCIFEROL 1.25 MG/1
50000 CAPSULE ORAL WEEKLY
COMMUNITY
Start: 2024-02-14 | End: 2025-02-13

## 2024-03-01 DIAGNOSIS — R06.09 DOE (DYSPNEA ON EXERTION): Primary | ICD-10-CM

## 2024-03-01 DIAGNOSIS — I10 HYPERTENSION: ICD-10-CM

## 2024-03-01 NOTE — PROGRESS NOTES
exploratory on 1/17/2020.    3.  Right arm infection treated with IV oral antibiotics, 7/2021.    4.  History of anxiety and depression.  5.  History of GERD.    6.  History of vitamin D deficiency.   7.  Fatty liver with elevated hepatocellular enzymes.  8.  Obesity.    9.  Hypertension.   10.  Diabetes mellitus with proteinuria and nephropathy.   11. Chronic kidney disease, Stage 1.  12. Mixed hyperlipidemia.   13. History of kidney stones.   14. Echocardiogram done on 7/9/2021 was read as showing left ventricular size and wall thickness with normal LV segmental wall motion with Stage 2 diastolic dysfunction.  Mildly dilated right ventricle with normal right ventricular systolic function.  Borderline dilated left atrium.  No significant valvular abnormalities reported.  Normal right ventricular systolic pressure.   15.  Event monitor from 07/21/2021-08/19/2021 was unremarkable with no significant arrhythmias.     FAMILY HISTORY:  Father living in his late 80's to early 90's is S/P aortic valve replacement surgery.  Suffers from dementia.  Mother living in her early 90s.  Has aortic stenosis and is S/P TAVR.     SOCIAL HISTORY:  Patient does not smoke.  He denies alcohol or illicit drug use.     O:  COMPLETE PHYSICAL EXAM:      /70 (Site: Right Upper Arm, Position: Sitting, Cuff Size: Large Adult)   Pulse 78   Ht 1.676 m (5' 6\")   Wt 92.5 kg (204 lb)   BMI 32.93 kg/m²      General:          Well-developed, well-nourished, obese male in no distress.   HEENT:           Normocephalic and atraumatic head.No JVD. No carotid bruits. Carotid upstrokes normal bilaterally. No thyromegaly.  Sclerae not icteric.  No xanthelasmas.  Mucous membranes moist.  Chest:              Symmetrical and nontender.  No deformities.  Lungs:             Clear to auscultation bilaterally.  Heart:              Normal S1 and S2.  No S3 or S4.  Grade 1/6 systolic murmur heard at the apex.    Abdomen:        Soft, nontender without

## 2024-03-08 ENCOUNTER — TELEPHONE (OUTPATIENT)
Dept: CARDIOLOGY | Age: 64
End: 2024-03-08

## 2024-03-08 NOTE — TELEPHONE ENCOUNTER
Spoke with patient to confirm stress test for Monday, 3/11/24, starting at 1130.  Instructions given and medications reviewed.  Patient instructed to hold Toprol XL for 24 hours prior to test.  All questions answered.

## 2024-03-11 ENCOUNTER — HOSPITAL ENCOUNTER (OUTPATIENT)
Dept: CARDIOLOGY | Age: 64
Discharge: HOME | End: 2024-03-13
Attending: INTERNAL MEDICINE
Payer: COMMERCIAL

## 2024-03-11 VITALS
HEIGHT: 66 IN | RESPIRATION RATE: 18 BRPM | BODY MASS INDEX: 32.78 KG/M2 | WEIGHT: 204 LBS | HEART RATE: 80 BPM | SYSTOLIC BLOOD PRESSURE: 142 MMHG | DIASTOLIC BLOOD PRESSURE: 80 MMHG

## 2024-03-11 DIAGNOSIS — E11.42 DIABETIC PERIPHERAL NEUROPATHY (HCC): ICD-10-CM

## 2024-03-11 DIAGNOSIS — E11.8 DM (DIABETES MELLITUS), TYPE 2 WITH COMPLICATIONS (HCC): ICD-10-CM

## 2024-03-11 DIAGNOSIS — R80.9 PROTEINURIA, UNSPECIFIED TYPE: ICD-10-CM

## 2024-03-11 DIAGNOSIS — I10 ESSENTIAL HYPERTENSION: ICD-10-CM

## 2024-03-11 DIAGNOSIS — E78.2 MIXED HYPERLIPIDEMIA: ICD-10-CM

## 2024-03-11 LAB
ECHO BSA: 2.08 M2
STRESS BASELINE DIAS BP: 80 MMHG
STRESS BASELINE HR: 80 BPM
STRESS BASELINE SYS BP: 142 MMHG
STRESS ESTIMATED WORKLOAD: 6.5 METS
STRESS EXERCISE DUR MIN: 6 MIN
STRESS EXERCISE DUR SEC: 1 SEC
STRESS O2 SAT PEAK: 98 %
STRESS O2 SAT REST: 97 %
STRESS PEAK DIAS BP: 86 MMHG
STRESS PEAK SYS BP: 166 MMHG
STRESS PERCENT HR ACHIEVED: 88 %
STRESS POST PEAK HR: 139 BPM
STRESS RATE PRESSURE PRODUCT: NORMAL BPM*MMHG
STRESS ST DEPRESSION: 0 MM
STRESS TARGET HR: 158 BPM

## 2024-03-11 PROCEDURE — 93017 CV STRESS TEST TRACING ONLY: CPT

## 2024-03-11 PROCEDURE — 93018 CV STRESS TEST I&R ONLY: CPT | Performed by: INTERNAL MEDICINE

## 2024-03-11 PROCEDURE — 93016 CV STRESS TEST SUPVJ ONLY: CPT | Performed by: INTERNAL MEDICINE

## 2024-03-13 ENCOUNTER — TELEPHONE (OUTPATIENT)
Dept: CARDIOLOGY CLINIC | Age: 64
End: 2024-03-13

## 2024-03-19 RX ORDER — BLOOD-GLUCOSE METER
EACH MISCELLANEOUS
Qty: 100 EACH | Refills: 3 | OUTPATIENT
Start: 2024-03-19

## 2024-03-19 RX ORDER — BLOOD-GLUCOSE METER
EACH MISCELLANEOUS
Qty: 1 EACH | Refills: 0 | OUTPATIENT
Start: 2024-03-19

## 2024-03-23 DIAGNOSIS — E11.21 TYPE 2 DIABETES MELLITUS WITH DIABETIC NEPHROPATHY, WITHOUT LONG-TERM CURRENT USE OF INSULIN (HCC): ICD-10-CM

## 2024-03-26 RX ORDER — GLYBURIDE 5 MG/1
5 TABLET ORAL DAILY
Qty: 90 TABLET | Refills: 0 | OUTPATIENT
Start: 2024-03-26

## 2024-04-04 DIAGNOSIS — E11.21 TYPE 2 DIABETES MELLITUS WITH DIABETIC NEPHROPATHY, WITHOUT LONG-TERM CURRENT USE OF INSULIN (HCC): ICD-10-CM

## 2024-04-05 RX ORDER — GLYBURIDE 5 MG/1
5 TABLET ORAL DAILY
Qty: 90 TABLET | Refills: 1 | Status: SHIPPED | OUTPATIENT
Start: 2024-04-05

## 2024-04-08 PROBLEM — E79.0 HYPERURICEMIA: Status: ACTIVE | Noted: 2020-08-05

## 2024-04-08 PROBLEM — G43.909 MIGRAINE: Status: ACTIVE | Noted: 2019-04-08

## 2024-04-08 PROBLEM — E11.21 TYPE 2 DIABETES MELLITUS WITH DIABETIC NEPHROPATHY (HCC): Status: ACTIVE | Noted: 2024-04-08

## 2024-04-08 PROBLEM — R80.9 NEPHROTIC RANGE PROTEINURIA: Status: ACTIVE | Noted: 2024-02-14

## 2024-04-08 PROBLEM — K76.0 NONALCOHOLIC FATTY LIVER: Chronic | Status: ACTIVE | Noted: 2020-08-05

## 2024-04-09 ENCOUNTER — OFFICE VISIT (OUTPATIENT)
Dept: PRIMARY CARE CLINIC | Age: 64
End: 2024-04-09
Payer: COMMERCIAL

## 2024-04-09 VITALS
SYSTOLIC BLOOD PRESSURE: 138 MMHG | WEIGHT: 203 LBS | DIASTOLIC BLOOD PRESSURE: 80 MMHG | HEART RATE: 83 BPM | BODY MASS INDEX: 32.62 KG/M2 | TEMPERATURE: 97.2 F | OXYGEN SATURATION: 97 % | HEIGHT: 66 IN | RESPIRATION RATE: 16 BRPM

## 2024-04-09 DIAGNOSIS — E11.21 TYPE 2 DIABETES MELLITUS WITH DIABETIC NEPHROPATHY, UNSPECIFIED WHETHER LONG TERM INSULIN USE (HCC): ICD-10-CM

## 2024-04-09 DIAGNOSIS — E66.09 CLASS 1 OBESITY DUE TO EXCESS CALORIES WITH SERIOUS COMORBIDITY AND BODY MASS INDEX (BMI) OF 33.0 TO 33.9 IN ADULT: ICD-10-CM

## 2024-04-09 DIAGNOSIS — M54.2 NECK PAIN: ICD-10-CM

## 2024-04-09 DIAGNOSIS — E79.0 HYPERURICEMIA: ICD-10-CM

## 2024-04-09 DIAGNOSIS — I10 ESSENTIAL HYPERTENSION: ICD-10-CM

## 2024-04-09 DIAGNOSIS — R20.0 LEFT LEG NUMBNESS: Primary | ICD-10-CM

## 2024-04-09 DIAGNOSIS — E78.2 MIXED HYPERLIPIDEMIA: ICD-10-CM

## 2024-04-09 LAB
ALBUMIN SERPL-MCNC: 4.5 G/DL (ref 3.5–5.2)
ALP BLD-CCNC: 67 U/L (ref 40–129)
ALT SERPL-CCNC: 34 U/L (ref 0–40)
ANION GAP SERPL CALCULATED.3IONS-SCNC: 16 MMOL/L (ref 7–16)
AST SERPL-CCNC: 24 U/L (ref 0–39)
BASOPHILS ABSOLUTE: 0.05 K/UL (ref 0–0.2)
BASOPHILS RELATIVE PERCENT: 1 % (ref 0–2)
BILIRUB SERPL-MCNC: 0.4 MG/DL (ref 0–1.2)
BUN BLDV-MCNC: 22 MG/DL (ref 6–23)
CALCIUM SERPL-MCNC: 9.9 MG/DL (ref 8.6–10.2)
CHLORIDE BLD-SCNC: 101 MMOL/L (ref 98–107)
CHOLESTEROL: 145 MG/DL
CO2: 20 MMOL/L (ref 22–29)
CREAT SERPL-MCNC: 1.2 MG/DL (ref 0.7–1.2)
EOSINOPHILS ABSOLUTE: 0.76 K/UL (ref 0.05–0.5)
EOSINOPHILS RELATIVE PERCENT: 12 % (ref 0–6)
GFR SERPL CREATININE-BSD FRML MDRD: 72 ML/MIN/1.73M2
GLUCOSE BLD-MCNC: 176 MG/DL (ref 74–99)
HBA1C MFR BLD: 6.5 % (ref 4–5.6)
HCT VFR BLD CALC: 42.5 % (ref 37–54)
HDLC SERPL-MCNC: 41 MG/DL
HEMOGLOBIN: 14.4 G/DL (ref 12.5–16.5)
IMMATURE GRANULOCYTES %: 1 % (ref 0–5)
IMMATURE GRANULOCYTES ABSOLUTE: 0.03 K/UL (ref 0–0.58)
LDL CHOLESTEROL: 61 MG/DL
LYMPHOCYTES ABSOLUTE: 0.91 K/UL (ref 1.5–4)
LYMPHOCYTES RELATIVE PERCENT: 14 % (ref 20–42)
MCH RBC QN AUTO: 29.8 PG (ref 26–35)
MCHC RBC AUTO-ENTMCNC: 33.9 G/DL (ref 32–34.5)
MCV RBC AUTO: 87.8 FL (ref 80–99.9)
MONOCYTES ABSOLUTE: 0.54 K/UL (ref 0.1–0.95)
MONOCYTES RELATIVE PERCENT: 8 % (ref 2–12)
NEUTROPHILS ABSOLUTE: 4.24 K/UL (ref 1.8–7.3)
NEUTROPHILS RELATIVE PERCENT: 65 % (ref 43–80)
PDW BLD-RTO: 13.6 % (ref 11.5–15)
PLATELET # BLD: 161 K/UL (ref 130–450)
PMV BLD AUTO: 9.8 FL (ref 7–12)
POTASSIUM SERPL-SCNC: 4.3 MMOL/L (ref 3.5–5)
RBC # BLD: 4.84 M/UL (ref 3.8–5.8)
SODIUM BLD-SCNC: 137 MMOL/L (ref 132–146)
TOTAL PROTEIN: 7.7 G/DL (ref 6.4–8.3)
TRIGL SERPL-MCNC: 215 MG/DL
TSH SERPL DL<=0.05 MIU/L-ACNC: 1.92 UIU/ML (ref 0.27–4.2)
URIC ACID: 5.6 MG/DL (ref 3.4–7)
VLDLC SERPL CALC-MCNC: 43 MG/DL
WBC # BLD: 6.5 K/UL (ref 4.5–11.5)

## 2024-04-09 PROCEDURE — 99215 OFFICE O/P EST HI 40 MIN: CPT | Performed by: FAMILY MEDICINE

## 2024-04-09 PROCEDURE — 3075F SYST BP GE 130 - 139MM HG: CPT | Performed by: FAMILY MEDICINE

## 2024-04-09 PROCEDURE — 3079F DIAST BP 80-89 MM HG: CPT | Performed by: FAMILY MEDICINE

## 2024-04-09 RX ORDER — FINERENONE 10 MG/1
10 TABLET, FILM COATED ORAL DAILY
COMMUNITY

## 2024-04-09 ASSESSMENT — PATIENT HEALTH QUESTIONNAIRE - PHQ9
4. FEELING TIRED OR HAVING LITTLE ENERGY: NOT AT ALL
7. TROUBLE CONCENTRATING ON THINGS, SUCH AS READING THE NEWSPAPER OR WATCHING TELEVISION: NOT AT ALL
SUM OF ALL RESPONSES TO PHQ QUESTIONS 1-9: 0
SUM OF ALL RESPONSES TO PHQ QUESTIONS 1-9: 0
10. IF YOU CHECKED OFF ANY PROBLEMS, HOW DIFFICULT HAVE THESE PROBLEMS MADE IT FOR YOU TO DO YOUR WORK, TAKE CARE OF THINGS AT HOME, OR GET ALONG WITH OTHER PEOPLE: NOT DIFFICULT AT ALL
SUM OF ALL RESPONSES TO PHQ QUESTIONS 1-9: 0
SUM OF ALL RESPONSES TO PHQ9 QUESTIONS 1 & 2: 0
8. MOVING OR SPEAKING SO SLOWLY THAT OTHER PEOPLE COULD HAVE NOTICED. OR THE OPPOSITE, BEING SO FIGETY OR RESTLESS THAT YOU HAVE BEEN MOVING AROUND A LOT MORE THAN USUAL: NOT AT ALL
3. TROUBLE FALLING OR STAYING ASLEEP: NOT AT ALL
2. FEELING DOWN, DEPRESSED OR HOPELESS: NOT AT ALL
6. FEELING BAD ABOUT YOURSELF - OR THAT YOU ARE A FAILURE OR HAVE LET YOURSELF OR YOUR FAMILY DOWN: NOT AT ALL
5. POOR APPETITE OR OVEREATING: NOT AT ALL
SUM OF ALL RESPONSES TO PHQ QUESTIONS 1-9: 0
9. THOUGHTS THAT YOU WOULD BE BETTER OFF DEAD, OR OF HURTING YOURSELF: NOT AT ALL
1. LITTLE INTEREST OR PLEASURE IN DOING THINGS: NOT AT ALL

## 2024-04-09 NOTE — PROGRESS NOTES
Lab draw Left AC 22 x 1 1/4\". Venipuncture x 1  
mouth daily 90 capsule 3    aspirin 81 MG tablet Take 1 tablet by mouth daily       No current facility-administered medications for this visit.      An  electronic signature was used to authenticate this note.    --Brendan Grayson MD on 4/9/2024 at 9:31 AM

## 2024-04-17 ENCOUNTER — HOSPITAL ENCOUNTER (OUTPATIENT)
Dept: GENERAL RADIOLOGY | Age: 64
Discharge: HOME | End: 2024-04-19
Payer: COMMERCIAL

## 2024-04-17 ENCOUNTER — HOSPITAL ENCOUNTER (OUTPATIENT)
Age: 64
Discharge: HOME | End: 2024-04-17
Payer: COMMERCIAL

## 2024-04-17 ENCOUNTER — HOSPITAL ENCOUNTER (OUTPATIENT)
Age: 64
Discharge: HOME | End: 2024-04-19
Payer: COMMERCIAL

## 2024-04-17 DIAGNOSIS — M54.2 NECK PAIN: ICD-10-CM

## 2024-04-17 DIAGNOSIS — R20.0 LEFT LEG NUMBNESS: ICD-10-CM

## 2024-04-17 LAB
ANION GAP SERPL CALCULATED.3IONS-SCNC: 12 MMOL/L (ref 7–16)
BUN SERPL-MCNC: 28 MG/DL (ref 6–23)
CALCIUM SERPL-MCNC: 10.6 MG/DL (ref 8.6–10.2)
CHLORIDE SERPL-SCNC: 100 MMOL/L (ref 98–107)
CO2 SERPL-SCNC: 24 MMOL/L (ref 22–29)
CREAT SERPL-MCNC: 1.2 MG/DL (ref 0.7–1.2)
GFR SERPL CREATININE-BSD FRML MDRD: 66 ML/MIN/1.73M2
GLUCOSE SERPL-MCNC: 151 MG/DL (ref 74–99)
POTASSIUM SERPL-SCNC: 4.8 MMOL/L (ref 3.5–5)
SODIUM SERPL-SCNC: 136 MMOL/L (ref 132–146)

## 2024-04-17 PROCEDURE — 80048 BASIC METABOLIC PNL TOTAL CA: CPT

## 2024-04-17 PROCEDURE — 36415 COLL VENOUS BLD VENIPUNCTURE: CPT

## 2024-04-17 PROCEDURE — 72100 X-RAY EXAM L-S SPINE 2/3 VWS: CPT

## 2024-04-17 PROCEDURE — 72040 X-RAY EXAM NECK SPINE 2-3 VW: CPT

## 2024-04-21 DIAGNOSIS — E11.21 TYPE 2 DIABETES MELLITUS WITH DIABETIC NEPHROPATHY, WITHOUT LONG-TERM CURRENT USE OF INSULIN (HCC): ICD-10-CM

## 2024-04-22 ENCOUNTER — OFFICE VISIT (OUTPATIENT)
Dept: PRIMARY CARE CLINIC | Age: 64
End: 2024-04-22
Payer: COMMERCIAL

## 2024-04-22 VITALS
BODY MASS INDEX: 32.62 KG/M2 | DIASTOLIC BLOOD PRESSURE: 80 MMHG | HEIGHT: 66 IN | OXYGEN SATURATION: 98 % | RESPIRATION RATE: 16 BRPM | SYSTOLIC BLOOD PRESSURE: 122 MMHG | HEART RATE: 84 BPM | WEIGHT: 203 LBS | TEMPERATURE: 97.7 F

## 2024-04-22 DIAGNOSIS — E66.09 CLASS 1 OBESITY DUE TO EXCESS CALORIES WITH SERIOUS COMORBIDITY AND BODY MASS INDEX (BMI) OF 33.0 TO 33.9 IN ADULT: ICD-10-CM

## 2024-04-22 DIAGNOSIS — E78.2 MIXED HYPERLIPIDEMIA: ICD-10-CM

## 2024-04-22 DIAGNOSIS — E11.21 TYPE 2 DIABETES MELLITUS WITH DIABETIC NEPHROPATHY, WITHOUT LONG-TERM CURRENT USE OF INSULIN (HCC): ICD-10-CM

## 2024-04-22 DIAGNOSIS — R80.9 NEPHROTIC RANGE PROTEINURIA: ICD-10-CM

## 2024-04-22 DIAGNOSIS — J30.9 ALLERGIC RHINITIS, UNSPECIFIED SEASONALITY, UNSPECIFIED TRIGGER: ICD-10-CM

## 2024-04-22 DIAGNOSIS — M47.812 DEGENERATIVE JOINT DISEASE OF CERVICAL AND LUMBAR SPINE: ICD-10-CM

## 2024-04-22 DIAGNOSIS — M47.816 DEGENERATIVE JOINT DISEASE OF CERVICAL AND LUMBAR SPINE: ICD-10-CM

## 2024-04-22 DIAGNOSIS — I10 ESSENTIAL HYPERTENSION: Primary | ICD-10-CM

## 2024-04-22 PROCEDURE — 3079F DIAST BP 80-89 MM HG: CPT | Performed by: FAMILY MEDICINE

## 2024-04-22 PROCEDURE — 99215 OFFICE O/P EST HI 40 MIN: CPT | Performed by: FAMILY MEDICINE

## 2024-04-22 PROCEDURE — 3074F SYST BP LT 130 MM HG: CPT | Performed by: FAMILY MEDICINE

## 2024-04-22 PROCEDURE — 3044F HG A1C LEVEL LT 7.0%: CPT | Performed by: FAMILY MEDICINE

## 2024-04-22 NOTE — PROGRESS NOTES
4/22/2024     Chief Complaint   Patient presents with    Numbness     Numbness left  thigh follow up       HPI:  Patient comes in for follow-up and left thigh numbness today.  He continues to have numbness over the left thigh whenever he is standing 5-7 minutes.  There is no burning in the thigh.  He has had no radiation of pain numbness or weakness of below the knee.  He has not had any back pain.  He had x-rays of cervical and lumbar spine showing mild to moderate DJD.  He does have some pain and irritation of his neck with crepitation when he moves his head.  Yesterday he had episode of upset stomach 2 hours after lunch associated with diaphoresis and not feeling good.  He did not check his fingerstick blood sugar.  He has kidney biopsy scheduled for tomorrow for evaluation of proteinuria.  He tries to follow a healthy diet and also exercises some.  He denies any GI or  complaints. He remains on his usual meds and supplements the same as listed on his med list, which was reviewed with him. He follows up with his endocrinologist for management of his diabetes mellitus.     Review of Systems: as per HPI.    PHYSICAL EXAM:      Vitals:    04/22/24 0837   BP: 122/80   Pulse: 84   Resp: 16   Temp: 97.7 °F (36.5 °C)   SpO2: 98%   Weight: 92.1 kg (203 lb)   Height: 1.676 m (5' 5.98\")     Body mass index is 32.78 kg/m².    GEN:  WDWN male patient seated in chair in NAD.     HEAD:  atraumatic, normocephalic.     EYES:  EOMI, PERRL, conjunctivae appear normal.    ENT: Good hearing, EACs without wax, TM's normal, nasal septum midline, no significant congestion, oral cavity without lesions, fair dentition.     NECK:  fair-good ROM, no palpable masses, no carotid bruits, no JVD.    LUNGS:  clear to ausc, no rales, rhonchi or wheezes.     HEART:  RRR, no murmurs or gallops.     ABD:  soft, non-tender to palp, no palp masses or HSM, normal BS.     BACK:  no scoliosis or kyphosis, non-tender to palp.    EXTREMITIES: No edema,

## 2024-04-23 ENCOUNTER — HOSPITAL ENCOUNTER (OUTPATIENT)
Dept: CT IMAGING | Age: 64
Discharge: HOME | End: 2024-04-25
Payer: COMMERCIAL

## 2024-04-23 VITALS
BODY MASS INDEX: 32.14 KG/M2 | OXYGEN SATURATION: 93 % | SYSTOLIC BLOOD PRESSURE: 130 MMHG | DIASTOLIC BLOOD PRESSURE: 81 MMHG | HEART RATE: 78 BPM | RESPIRATION RATE: 14 BRPM | TEMPERATURE: 97.9 F | WEIGHT: 200 LBS | HEIGHT: 66 IN

## 2024-04-23 DIAGNOSIS — N18.1 CHRONIC KIDNEY DISEASE, STAGE I: ICD-10-CM

## 2024-04-23 DIAGNOSIS — R80.8 NEPHROGENOUS PROTEINURIA: ICD-10-CM

## 2024-04-23 LAB
INR PPP: 1.2
PROTHROMBIN TIME: 12.9 SEC (ref 9.3–12.4)

## 2024-04-23 PROCEDURE — 36415 COLL VENOUS BLD VENIPUNCTURE: CPT

## 2024-04-23 PROCEDURE — 77012 CT SCAN FOR NEEDLE BIOPSY: CPT

## 2024-04-23 PROCEDURE — 6360000002 HC RX W HCPCS: Performed by: RADIOLOGY

## 2024-04-23 PROCEDURE — 2500000003 HC RX 250 WO HCPCS: Performed by: RADIOLOGY

## 2024-04-23 PROCEDURE — 7100000010 HC PHASE II RECOVERY - FIRST 15 MIN

## 2024-04-23 PROCEDURE — 85610 PROTHROMBIN TIME: CPT

## 2024-04-23 PROCEDURE — 2709999900 CT BIOPSY RENAL

## 2024-04-23 PROCEDURE — 7100000011 HC PHASE II RECOVERY - ADDTL 15 MIN

## 2024-04-23 RX ORDER — LIDOCAINE HYDROCHLORIDE AND EPINEPHRINE BITARTRATE 20; .01 MG/ML; MG/ML
INJECTION, SOLUTION SUBCUTANEOUS PRN
Status: COMPLETED | OUTPATIENT
Start: 2024-04-23 | End: 2024-04-23

## 2024-04-23 RX ORDER — LIDOCAINE HYDROCHLORIDE 20 MG/ML
INJECTION, SOLUTION INFILTRATION; PERINEURAL PRN
Status: COMPLETED | OUTPATIENT
Start: 2024-04-23 | End: 2024-04-23

## 2024-04-23 RX ORDER — FENTANYL CITRATE 50 UG/ML
INJECTION, SOLUTION INTRAMUSCULAR; INTRAVENOUS PRN
Status: COMPLETED | OUTPATIENT
Start: 2024-04-23 | End: 2024-04-23

## 2024-04-23 RX ORDER — MIDAZOLAM HYDROCHLORIDE 2 MG/2ML
INJECTION, SOLUTION INTRAMUSCULAR; INTRAVENOUS PRN
Status: COMPLETED | OUTPATIENT
Start: 2024-04-23 | End: 2024-04-23

## 2024-04-23 RX ORDER — KETOROLAC TROMETHAMINE 30 MG/ML
30 INJECTION, SOLUTION INTRAMUSCULAR; INTRAVENOUS ONCE
OUTPATIENT
Start: 2024-04-23 | End: 2024-04-23

## 2024-04-23 RX ORDER — DIPHENHYDRAMINE HYDROCHLORIDE 50 MG/ML
INJECTION INTRAMUSCULAR; INTRAVENOUS PRN
Status: COMPLETED | OUTPATIENT
Start: 2024-04-23 | End: 2024-04-23

## 2024-04-23 RX ADMIN — MIDAZOLAM HYDROCHLORIDE 0.5 MG: 1 INJECTION, SOLUTION INTRAMUSCULAR; INTRAVENOUS at 09:22

## 2024-04-23 RX ADMIN — LIDOCAINE HYDROCHLORIDE,EPINEPHRINE BITARTRATE 10 ML: 20; .01 INJECTION, SOLUTION INFILTRATION; PERINEURAL at 09:16

## 2024-04-23 RX ADMIN — LIDOCAINE HYDROCHLORIDE 10 ML: 20 INJECTION, SOLUTION INFILTRATION; PERINEURAL at 09:16

## 2024-04-23 RX ADMIN — FENTANYL CITRATE 25 MCG: 50 INJECTION, SOLUTION INTRAMUSCULAR; INTRAVENOUS at 09:21

## 2024-04-23 RX ADMIN — DIPHENHYDRAMINE HYDROCHLORIDE 25 MG: 50 INJECTION, SOLUTION INTRAMUSCULAR; INTRAVENOUS at 09:12

## 2024-04-23 RX ADMIN — MIDAZOLAM HYDROCHLORIDE 0.5 MG: 1 INJECTION, SOLUTION INTRAMUSCULAR; INTRAVENOUS at 09:12

## 2024-04-23 RX ADMIN — THROMBIN HUMAN 1 KIT: 2000 POWDER, FOR SOLUTION TOPICAL at 09:25

## 2024-04-23 RX ADMIN — FENTANYL CITRATE 25 MCG: 50 INJECTION, SOLUTION INTRAMUSCULAR; INTRAVENOUS at 09:13

## 2024-04-23 ASSESSMENT — PAIN - FUNCTIONAL ASSESSMENT
PAIN_FUNCTIONAL_ASSESSMENT: NONE - DENIES PAIN
PAIN_FUNCTIONAL_ASSESSMENT: NONE - DENIES PAIN

## 2024-04-23 NOTE — PROGRESS NOTES
Patient arrived via ambulation with family member to Radiology department for renal biopsy. Allergies, home medications, H&P and fasting instructions reviewed with patient. Vital signs taken. 20g IV placed, blood obtained, IV flushed and prn adapter attached. Blood sample sent to lab for ordered tests.  Procedural instructions given, questions answered, understanding expressed and consent signed. Patient given fluoroscopy education, no questions at this time.

## 2024-04-23 NOTE — BRIEF OP NOTE
Brief Postoperative Note      Patient: Gualberto Morales  YOB: 1960  MRN: [id number]    Date of Procedure: 4/23/2024    Renal vascular hypertension    Post-Op Diagnosis: Same  CT renal biopsy    ALAN Garrido    Assistant:  * No surgical staff found *    Anesthesia: * Moderate sedation    Estimated Blood Loss (mL): Minimal    Complications: None    Specimens:   renal    Implants:  * No implants in log *      Drains: * No LDAs found *    Findings:  Infection Present At Time Of Surgery (PATOS) (choose all levels that have infection present):  No infection present  Other Findings: 18 G cores    Electronically signed by ABRAHAM Garrido MD on 4/23/2024 at 9:37 AM

## 2024-04-23 NOTE — PROCEDURES
Patient returned from procedure. Dressing checked, clean, dry, and intact. Patient stable. No s/s of complications noted or reported. Vitals will be checked q 15min, see flow sheets.    Patient eating and drinking well with no s/s of complications noted or reported.    Patient discharged, site was checked with every set of vitals. Site clean dry and intact. Discharge papers reviewed with patient, questions answered, discharge paper signed. Patient taken to door via ambulatory. Patient in stable condition, no s/s of complications noted or reported.

## 2024-04-23 NOTE — PRE SEDATION
Sedation Pre-Procedure Note    Patient Name: Gualberto Morales   YOB: 1960  Room/Bed: Room/bed info not found  Medical Record Number: [id number]  Date: 4/23/2024   Time: 9:36 AM       Indication:  renal vascular hypertension    Consent: I have discussed with the patient and/or the patient representative the indication, alternatives, and the possible risks and/or complications of the planned procedure and the anesthesia methods. The patient and/or patient representative appear to understand and agree to proceed.    Vital Signs:   Vitals:    04/23/24 0930   BP: 130/87   Pulse: 87   Resp: 20   Temp:    SpO2: 94%       Past Medical History:   has a past medical history of Anxiety, Appendicitis, Cellulitis, Class 1 obesity due to excess calories in adult, Depression, Diabetes mellitus (HCC), Diabetic nephropathy associated with type 2 diabetes mellitus (HCC), Gastroesophageal reflux disease without esophagitis, Grade II diastolic dysfunction, Hepatic steatosis, Hyperlipidemia, Hypertension, Infective myositis of right upper extremity, Kidney stones, Mesenteric hematoma, Mixed hyperlipidemia, Proteinuria, Stage 3a chronic kidney disease (HCC), Type 2 diabetes mellitus without complication (HCC), and Vitamin D deficiency.    Past Surgical History:   has a past surgical history that includes Colonoscopy (11/04/2019); laparoscopic appendectomy (N/A, 01/17/2020); and Appendectomy (2019).    Medications:   Scheduled Meds:   Continuous Infusions:   PRN Meds:   Home Meds:   Prior to Admission medications    Medication Sig Start Date End Date Taking? Authorizing Provider   empagliflozin (JARDIANCE) 25 MG tablet TAKE 1 TABLET BY MOUTH EVERY DAY 4/23/24   Romulo Segura MD   metFORMIN (GLUCOPHAGE) 1000 MG tablet TAKE 1 TABLET BY MOUTH TWICE A DAY 4/23/24   Romulo Segura MD   KERENDIA 10 MG TABS Take 10 mg by mouth Daily    ProviderDhaval MD   glyBURIDE (DIABETA) 5 MG tablet TAKE 1 TABLET BY MOUTH EVERY

## 2024-04-25 LAB — SURGICAL PATHOLOGY REPORT: NORMAL

## 2024-05-14 LAB — DIABETIC RETINOPATHY: POSITIVE

## 2024-05-24 ENCOUNTER — HOSPITAL ENCOUNTER (OUTPATIENT)
Age: 64
Discharge: HOME | End: 2024-05-24
Payer: COMMERCIAL

## 2024-05-24 LAB
25(OH)D3 SERPL-MCNC: 27.7 NG/ML (ref 30–100)
ALBUMIN SERPL-MCNC: 4.6 G/DL (ref 3.5–5.2)
ALP SERPL-CCNC: 67 U/L (ref 40–129)
ALT SERPL-CCNC: ABNORMAL U/L (ref 0–40)
ANION GAP SERPL CALCULATED.3IONS-SCNC: 12 MMOL/L (ref 7–16)
AST SERPL-CCNC: ABNORMAL U/L (ref 0–39)
BASOPHILS # BLD: 0.04 K/UL (ref 0–0.2)
BASOPHILS NFR BLD: 1 % (ref 0–2)
BILIRUB SERPL-MCNC: 0.3 MG/DL (ref 0–1.2)
BUN SERPL-MCNC: 27 MG/DL (ref 6–23)
CALCIUM SERPL-MCNC: 9.7 MG/DL (ref 8.6–10.2)
CHLORIDE SERPL-SCNC: 99 MMOL/L (ref 98–107)
CO2 SERPL-SCNC: 23 MMOL/L (ref 22–29)
CREAT SERPL-MCNC: 1.2 MG/DL (ref 0.7–1.2)
CREAT UR-MCNC: 78.6 MG/DL (ref 40–278)
CREAT UR-MCNC: 79.5 MG/DL (ref 40–278)
EOSINOPHIL # BLD: 0.61 K/UL (ref 0.05–0.5)
EOSINOPHILS RELATIVE PERCENT: 11 % (ref 0–6)
ERYTHROCYTE [DISTWIDTH] IN BLOOD BY AUTOMATED COUNT: 14.4 % (ref 11.5–15)
GFR, ESTIMATED: 72 ML/MIN/1.73M2
GLUCOSE SERPL-MCNC: 165 MG/DL (ref 74–99)
HCT VFR BLD AUTO: 41.7 % (ref 37–54)
HGB BLD-MCNC: 13.8 G/DL (ref 12.5–16.5)
IMM GRANULOCYTES # BLD AUTO: <0.03 K/UL (ref 0–0.58)
IMM GRANULOCYTES NFR BLD: 0 % (ref 0–5)
LYMPHOCYTES NFR BLD: 0.88 K/UL (ref 1.5–4)
LYMPHOCYTES RELATIVE PERCENT: 16 % (ref 20–42)
MAGNESIUM SERPL-MCNC: 1.8 MG/DL (ref 1.6–2.6)
MCH RBC QN AUTO: 29.6 PG (ref 26–35)
MCHC RBC AUTO-ENTMCNC: 33.1 G/DL (ref 32–34.5)
MCV RBC AUTO: 89.3 FL (ref 80–99.9)
MICROALBUMIN UR-MCNC: 919 MG/L (ref 0–19)
MICROALBUMIN/CREAT UR-RTO: 1170 MCG/MG CREAT (ref 0–30)
MONOCYTES NFR BLD: 0.44 K/UL (ref 0.1–0.95)
MONOCYTES NFR BLD: 8 % (ref 2–12)
NEUTROPHILS NFR BLD: 64 % (ref 43–80)
NEUTS SEG NFR BLD: 3.5 K/UL (ref 1.8–7.3)
PHOSPHATE SERPL-MCNC: 2.9 MG/DL (ref 2.5–4.5)
PLATELET # BLD AUTO: 149 K/UL (ref 130–450)
PMV BLD AUTO: 10.3 FL (ref 7–12)
POTASSIUM SERPL-SCNC: 4.2 MMOL/L (ref 3.5–5)
PROT SERPL-MCNC: 7.4 G/DL (ref 6.4–8.3)
RBC # BLD AUTO: 4.67 M/UL (ref 3.8–5.8)
SODIUM SERPL-SCNC: 134 MMOL/L (ref 132–146)
TOTAL PROTEIN, URINE: 121 MG/DL (ref 0–12)
URATE SERPL-MCNC: 6.1 MG/DL (ref 3.4–7)
URINE TOTAL PROTEIN CREATININE RATIO: 1.52 (ref 0–0.2)
WBC OTHER # BLD: 5.5 K/UL (ref 4.5–11.5)

## 2024-05-24 PROCEDURE — 80053 COMPREHEN METABOLIC PANEL: CPT

## 2024-05-24 PROCEDURE — 84100 ASSAY OF PHOSPHORUS: CPT

## 2024-05-24 PROCEDURE — 85025 COMPLETE CBC W/AUTO DIFF WBC: CPT

## 2024-05-24 PROCEDURE — 83735 ASSAY OF MAGNESIUM: CPT

## 2024-05-24 PROCEDURE — 84550 ASSAY OF BLOOD/URIC ACID: CPT

## 2024-05-24 PROCEDURE — 84156 ASSAY OF PROTEIN URINE: CPT

## 2024-05-24 PROCEDURE — 36415 COLL VENOUS BLD VENIPUNCTURE: CPT

## 2024-05-24 PROCEDURE — 82306 VITAMIN D 25 HYDROXY: CPT

## 2024-05-24 PROCEDURE — 82570 ASSAY OF URINE CREATININE: CPT

## 2024-05-24 PROCEDURE — 82043 UR ALBUMIN QUANTITATIVE: CPT

## 2024-05-30 ENCOUNTER — OFFICE VISIT (OUTPATIENT)
Dept: ENDOCRINOLOGY | Age: 64
End: 2024-05-30
Payer: COMMERCIAL

## 2024-05-30 VITALS
BODY MASS INDEX: 32.95 KG/M2 | HEART RATE: 74 BPM | DIASTOLIC BLOOD PRESSURE: 83 MMHG | HEIGHT: 66 IN | RESPIRATION RATE: 18 BRPM | OXYGEN SATURATION: 99 % | WEIGHT: 205 LBS | SYSTOLIC BLOOD PRESSURE: 135 MMHG

## 2024-05-30 DIAGNOSIS — E11.21 TYPE 2 DIABETES MELLITUS WITH DIABETIC NEPHROPATHY, WITHOUT LONG-TERM CURRENT USE OF INSULIN (HCC): Primary | ICD-10-CM

## 2024-05-30 DIAGNOSIS — I10 ESSENTIAL HYPERTENSION: ICD-10-CM

## 2024-05-30 DIAGNOSIS — E78.5 HYPERLIPIDEMIA, UNSPECIFIED HYPERLIPIDEMIA TYPE: ICD-10-CM

## 2024-05-30 PROCEDURE — 3044F HG A1C LEVEL LT 7.0%: CPT | Performed by: INTERNAL MEDICINE

## 2024-05-30 PROCEDURE — 3075F SYST BP GE 130 - 139MM HG: CPT | Performed by: INTERNAL MEDICINE

## 2024-05-30 PROCEDURE — 99212 OFFICE O/P EST SF 10 MIN: CPT | Performed by: INTERNAL MEDICINE

## 2024-05-30 PROCEDURE — 3079F DIAST BP 80-89 MM HG: CPT | Performed by: INTERNAL MEDICINE

## 2024-05-30 RX ORDER — SEMAGLUTIDE 0.68 MG/ML
INJECTION, SOLUTION SUBCUTANEOUS
Qty: 3 ML | Refills: 11 | Status: SHIPPED | OUTPATIENT
Start: 2024-05-30

## 2024-05-30 RX ORDER — ACYCLOVIR 400 MG/1
TABLET ORAL
Qty: 3 EACH | Refills: 11 | Status: SHIPPED | OUTPATIENT
Start: 2024-05-30

## 2024-05-30 RX ORDER — BLOOD-GLUCOSE SENSOR
EACH MISCELLANEOUS
Qty: 2 EACH | Refills: 11 | Status: SHIPPED | OUTPATIENT
Start: 2024-05-30

## 2024-05-30 NOTE — PROGRESS NOTES
[facility] [employer] [facility] Department of Endocrinology Diabetes and Metabolism   [address] [place] [id number]   Phone: [phone]  Fax: [phone]    Date of Service: 5/30/2024  Primary Care Physician: Brendan Grayson MD  Provider: Romulo Segura MD     Reason for the visit:  Poorly controlled DM type 2     History of Present Illness:  The history is provided by the patient. No  was used. Accuracy of the patient data is excellent.  Gualberto Morales is a very pleasant 64 y.o. male seen today for diabetes management     Gualberto Morales was diagnosed with diabetes around 20 years ago and currently on Metformin 1000 mg bid, Jardiance 25 mg daily Glyburide 5 mg daily   The patient has been checking blood sugar 1-2 times/day and most readings at goal   Most recent A1c results summarized below  Lab Results   Component Value Date/Time    LABA1C 6.5 04/09/2024 09:25 AM    LABA1C 6.9 03/07/2023 08:50 AM    LABA1C 7.4 11/04/2022 09:26 AM     Patient has had no hypoglycemic episodes   The patient has been mindful of what has been eating and following diabetic diet as encouraged  I reviewed current medications and the patient has no issues with diabetes medications  Gualberto Morales is up to date with eye exam and denied any history of diabetic retinopathy   The patient performs his own feet care  Microvascular complications:  No Retinopathy, Nephropathy or Neuropathy   Macrovascular complications: no CAD, PVD, or Stroke    PAST MEDICAL HISTORY   Past Medical History:   Diagnosis Date    Anxiety     Appendicitis 01/17/2020    Cellulitis 07/10/2021    Class 1 obesity due to excess calories in adult 12/03/2020    Depression     Diabetes mellitus (HCC) 2005    Diabetic nephropathy associated with type 2 diabetes mellitus (HCC) 12/03/2020    Gastroesophageal reflux disease without esophagitis 12/03/2020    Grade II diastolic dysfunction 07/2021    Hepatic steatosis 12/03/2020

## 2024-06-10 RX ORDER — LOSARTAN POTASSIUM 100 MG/1
100 TABLET ORAL EVERY MORNING
Qty: 90 TABLET | Refills: 3 | Status: SHIPPED | OUTPATIENT
Start: 2024-06-10

## 2024-06-11 ENCOUNTER — TELEPHONE (OUTPATIENT)
Dept: ENDOCRINOLOGY | Age: 64
End: 2024-06-11

## 2024-06-11 NOTE — TELEPHONE ENCOUNTER
Pt l/m on clinical line stating he had an issues with his CGM. Demetrio was sent last visit. Pt left no other info on what issue was. Called today and got v/m told pt to call back with more information.

## 2024-07-21 PROBLEM — E11.3299 MILD NONPROLIFERATIVE DIABETIC RETINOPATHY ASSOCIATED WITH TYPE 2 DIABETES MELLITUS (HCC): Status: ACTIVE | Noted: 2024-05-14

## 2024-07-21 PROBLEM — N18.1 CHRONIC KIDNEY DISEASE, STAGE I: Chronic | Status: ACTIVE | Noted: 2020-08-05

## 2024-08-03 DIAGNOSIS — E11.21 TYPE 2 DIABETES MELLITUS WITH DIABETIC NEPHROPATHY, WITHOUT LONG-TERM CURRENT USE OF INSULIN (HCC): ICD-10-CM

## 2024-08-05 RX ORDER — GLYBURIDE 5 MG/1
5 TABLET ORAL DAILY
Qty: 90 TABLET | Refills: 1 | Status: SHIPPED | OUTPATIENT
Start: 2024-08-05

## 2024-10-09 ENCOUNTER — OFFICE VISIT (OUTPATIENT)
Dept: ENDOCRINOLOGY | Age: 64
End: 2024-10-09
Payer: COMMERCIAL

## 2024-10-09 VITALS
SYSTOLIC BLOOD PRESSURE: 142 MMHG | RESPIRATION RATE: 18 BRPM | DIASTOLIC BLOOD PRESSURE: 85 MMHG | BODY MASS INDEX: 31.34 KG/M2 | HEIGHT: 66 IN | WEIGHT: 195 LBS | OXYGEN SATURATION: 99 % | HEART RATE: 78 BPM

## 2024-10-09 DIAGNOSIS — E11.21 TYPE 2 DIABETES MELLITUS WITH DIABETIC NEPHROPATHY, WITHOUT LONG-TERM CURRENT USE OF INSULIN (HCC): Primary | ICD-10-CM

## 2024-10-09 DIAGNOSIS — E78.5 HYPERLIPIDEMIA, UNSPECIFIED HYPERLIPIDEMIA TYPE: ICD-10-CM

## 2024-10-09 DIAGNOSIS — Z97.8 USES SELF-APPLIED CONTINUOUS GLUCOSE MONITORING DEVICE: ICD-10-CM

## 2024-10-09 DIAGNOSIS — Z91.119 DIETARY NONCOMPLIANCE: ICD-10-CM

## 2024-10-09 PROCEDURE — 3044F HG A1C LEVEL LT 7.0%: CPT | Performed by: INTERNAL MEDICINE

## 2024-10-09 PROCEDURE — 83036 HEMOGLOBIN GLYCOSYLATED A1C: CPT | Performed by: INTERNAL MEDICINE

## 2024-10-09 PROCEDURE — 3079F DIAST BP 80-89 MM HG: CPT | Performed by: INTERNAL MEDICINE

## 2024-10-09 PROCEDURE — 99214 OFFICE O/P EST MOD 30 MIN: CPT | Performed by: INTERNAL MEDICINE

## 2024-10-09 PROCEDURE — 3077F SYST BP >= 140 MM HG: CPT | Performed by: INTERNAL MEDICINE

## 2024-10-09 PROCEDURE — 95251 CONT GLUC MNTR ANALYSIS I&R: CPT | Performed by: INTERNAL MEDICINE

## 2024-10-09 RX ORDER — SEMAGLUTIDE 0.68 MG/ML
INJECTION, SOLUTION SUBCUTANEOUS
Qty: 3 ML | Refills: 11 | Status: SHIPPED | OUTPATIENT
Start: 2024-10-09

## 2024-10-09 NOTE — PROGRESS NOTES
(!) 142/85   05/30/24 135/83   04/23/24 130/81   04/22/24 122/80     Wt Readings from Last 6 Encounters:   10/09/24 88.5 kg (195 lb)   05/30/24 93 kg (205 lb)   04/23/24 90.7 kg (200 lb)   04/22/24 92.1 kg (203 lb)   04/09/24 92.1 kg (203 lb)   03/11/24 92.5 kg (204 lb)     Physical examination:  General: awake alert, oriented x3, no abnormal position or movements.  HEENT: normocephalic non-traumatic, no exophthalmos   Neck: supple, no LN enlargement, no thyromegaly, no thyroid tenderness, no JVD.  Pulm: Clear equal air entry no added sounds, no wheezing or rhonchi    CVS: S1 + S2, no murmur, no heave. Dorsalis pedis pulse palpable   Abd: soft lax, no tenderness, no organomegaly, audible bowel sounds.   Skin: warm, no lesions, no rash. No callus, no Ulcers, No acanthosis nigricans  Musculoskeletal: No back tenderness, no kyphosis/scoliosis    Neuro: CN intact, Monofilament sensation decreased bilateral , muscle power normal  Psych: normal mood, and affect    Review of Laboratory Data:  I personally reviewed the following lab:  Lab Results   Component Value Date/Time    WBC 5.5 05/24/2024 03:52 PM    RBC 4.67 05/24/2024 03:52 PM    HGB 13.8 05/24/2024 03:52 PM    HCT 41.7 05/24/2024 03:52 PM    MCV 89.3 05/24/2024 03:52 PM    MCH 29.6 05/24/2024 03:52 PM    MCHC 33.1 05/24/2024 03:52 PM    RDW 14.4 05/24/2024 03:52 PM     05/24/2024 03:52 PM    MPV 10.3 05/24/2024 03:52 PM      Lab Results   Component Value Date/Time     05/24/2024 03:52 PM    K 4.2 05/24/2024 03:52 PM    K 4.2 07/08/2021 04:19 PM    CO2 23 05/24/2024 03:52 PM    BUN 27 (H) 05/24/2024 03:52 PM    CREATININE 1.2 05/24/2024 03:52 PM    CALCIUM 9.7 05/24/2024 03:52 PM    LABGLOM 72 05/24/2024 03:52 PM    LABGLOM 66 04/17/2024 12:54 PM    GFRAA >60 01/27/2022 09:20 AM      Lab Results   Component Value Date/Time    TSH 1.92 04/09/2024 09:25 AM    T4FREE 1.16 07/09/2021 04:50 AM     Lab Results   Component Value Date/Time    LABA1C 6.5

## 2024-10-16 ENCOUNTER — TELEPHONE (OUTPATIENT)
Dept: PRIMARY CARE CLINIC | Age: 64
End: 2024-10-16

## 2024-10-16 NOTE — TELEPHONE ENCOUNTER
Left message for patient to call and make follow up visit and sent a my chart message to set up appointment per Dr Grayson's request from a note he received from the renal group.

## 2024-10-17 NOTE — TELEPHONE ENCOUNTER
Left message for patient to schedule follow up visit per Dr Grayson either by calling or making it on [employer].

## 2024-11-01 DIAGNOSIS — E78.1 HYPERTRIGLYCERIDEMIA: ICD-10-CM

## 2024-11-01 RX ORDER — FENOFIBRATE 134 MG/1
134 CAPSULE ORAL
Qty: 90 CAPSULE | Refills: 3 | Status: SHIPPED | OUTPATIENT
Start: 2024-11-01

## 2024-11-01 NOTE — TELEPHONE ENCOUNTER
Name of Medication(s) Requested:  Requested Prescriptions     Pending Prescriptions Disp Refills    fenofibrate micronized (LOFIBRA) 134 MG capsule 90 capsule 3     Sig: Take 1 capsule by mouth every morning (before breakfast)       Medication is on current medication list Yes    Dosage and directions were verified? Yes    Quantity verified: 90 day supply     Pharmacy Verified?  Yes    Last Appointment:  4/22/2024    Future appts:  Future Appointments   Date Time Provider Department Center   2/19/2025  9:45 AM Romulo Segura MD [facility] END[facility]        (If no appt send self scheduling link. .REFILLAPPT)  Scheduling request sent?     [] Yes  [] No    Does patient need updated?  [] Yes  [] No

## 2024-12-04 DIAGNOSIS — F32.A ANXIETY AND DEPRESSION: ICD-10-CM

## 2024-12-04 DIAGNOSIS — I10 ESSENTIAL HYPERTENSION: ICD-10-CM

## 2024-12-04 DIAGNOSIS — F41.9 ANXIETY AND DEPRESSION: ICD-10-CM

## 2024-12-04 RX ORDER — SERTRALINE HYDROCHLORIDE 100 MG/1
100 TABLET, FILM COATED ORAL DAILY
Qty: 90 TABLET | Refills: 3 | Status: SHIPPED | OUTPATIENT
Start: 2024-12-04

## 2024-12-04 RX ORDER — PRAVASTATIN SODIUM 40 MG
40 TABLET ORAL DAILY
Qty: 90 TABLET | Refills: 3 | Status: SHIPPED | OUTPATIENT
Start: 2024-12-04

## 2024-12-04 RX ORDER — METOPROLOL SUCCINATE 50 MG/1
50 TABLET, EXTENDED RELEASE ORAL 2 TIMES DAILY
Qty: 180 TABLET | Refills: 3 | Status: SHIPPED | OUTPATIENT
Start: 2024-12-04

## 2024-12-04 NOTE — TELEPHONE ENCOUNTER
Name of Medication(s) Requested:  Requested Prescriptions     Pending Prescriptions Disp Refills    metoprolol succinate (TOPROL XL) 50 MG extended release tablet 180 tablet 3     Sig: Take 1 tablet by mouth in the morning and at bedtime    pravastatin (PRAVACHOL) 40 MG tablet 90 tablet 3     Sig: Take 1 tablet by mouth daily    sertraline (ZOLOFT) 100 MG tablet 90 tablet 3     Sig: Take 1 tablet by mouth daily       Medication is on current medication list Yes    Dosage and directions were verified? Yes    Quantity verified: 90 day supply     Pharmacy Verified?  Yes    Last Appointment:  4/22/2024    Future appts:  Future Appointments   Date Time Provider Department Center   12/10/2024  8:30 AM Brendan Grayson MD TRAIL PC [facility]   2/19/2025  9:45 AM Romulo Segura MD [facility] END[facility]        (If no appt send self scheduling link. .REFILLAPPT)  Scheduling request sent?     [] Yes  [] No    Does patient need updated?  [] Yes  [] No

## 2024-12-09 NOTE — PROGRESS NOTES
LAPAROSCOPIC, EXPLORATORY LAPAROTOMY performed by Armando Calloway MD at [facility] OR     PHYSICAL EXAM:      Vitals:    12/10/24 0834   BP: 138/80   Pulse: 77   Resp: 17   Temp: 97 °F (36.1 °C)   SpO2: 94%   Weight: 90.3 kg (199 lb)   Height: 1.676 m (5' 5.98\")     Body mass index is 32.14 kg/m².    GEN:  WDWN male patient seated in chair in NAD.     HEAD:  atraumatic, normocephalic.     EYES:  EOMI, PERRL, conjunctivae appear normal.    ENT: Good hearing, EACs without wax, TM's normal, nasal septum midline, no significant congestion, oral cavity without lesions, fair dentition.     NECK:  fair-good ROM, no palpable masses, no carotid bruits, no JVD.    LUNGS:  clear to ausc, no rales, rhonchi or wheezes.     HEART:  RRR, no murmurs or gallops.     ABD:  soft, non-tender to palp, no palp masses or HSM, normal BS.     BACK:  no scoliosis or kyphosis, non-tender to palp.    EXTREMITIES: No edema, no ulcerations, varicosities or erythema.  No gross deformities.     MUSCULOSKELETAL: Good ROM of all joints, non tender to palp and or with movement.    SKIN: Posterior neck with irritated area and what looks like a scar.  There are excoriations.  No ulcerations or breakdown, rash, ecchymosis, or other lesions.    NEURO: No tremor, motor UEs 5/5 LEs  5/5, sensory normal, DTRs of lower extremities 2+ bilaterally, no ataxia.     PSYCH: Pleasant and cooperative.  Fluid speech, oriented to person, place, time. No delusional statements.    Lab Results   Component Value Date    LABA1C 6.5 (H) 04/09/2024    LABA1C 6.9 03/07/2023    LABA1C 7.4 (H) 11/04/2022     No results for input(s): \"WBC\", \"HGB\", \"HCT\", \"MCV\", \"PLT\" in the last 720 hours.     Lab Results   Component Value Date     05/24/2024    K 4.2 05/24/2024    CL 99 05/24/2024    CO2 23 05/24/2024    BUN 27 (H) 05/24/2024    CREATININE 1.2 05/24/2024    GLUCOSE 165 (H) 05/24/2024    CALCIUM 9.7 05/24/2024    BILITOT 0.3 05/24/2024    ALKPHOS 67 05/24/2024    AST Specimen

## 2024-12-10 ENCOUNTER — OFFICE VISIT (OUTPATIENT)
Dept: PRIMARY CARE CLINIC | Age: 64
End: 2024-12-10
Payer: COMMERCIAL

## 2024-12-10 VITALS
TEMPERATURE: 97 F | HEIGHT: 66 IN | BODY MASS INDEX: 31.98 KG/M2 | RESPIRATION RATE: 17 BRPM | OXYGEN SATURATION: 94 % | WEIGHT: 199 LBS | SYSTOLIC BLOOD PRESSURE: 138 MMHG | HEART RATE: 77 BPM | DIASTOLIC BLOOD PRESSURE: 80 MMHG

## 2024-12-10 DIAGNOSIS — E78.2 MIXED HYPERLIPIDEMIA: ICD-10-CM

## 2024-12-10 DIAGNOSIS — I10 ESSENTIAL HYPERTENSION: Primary | ICD-10-CM

## 2024-12-10 DIAGNOSIS — R80.9 NEPHROTIC RANGE PROTEINURIA: ICD-10-CM

## 2024-12-10 DIAGNOSIS — M47.816 DEGENERATIVE JOINT DISEASE OF CERVICAL AND LUMBAR SPINE: ICD-10-CM

## 2024-12-10 DIAGNOSIS — M47.812 DEGENERATIVE JOINT DISEASE OF CERVICAL AND LUMBAR SPINE: ICD-10-CM

## 2024-12-10 DIAGNOSIS — K76.0 HEPATIC STEATOSIS: ICD-10-CM

## 2024-12-10 DIAGNOSIS — E11.21 TYPE 2 DIABETES MELLITUS WITH DIABETIC NEPHROPATHY, WITHOUT LONG-TERM CURRENT USE OF INSULIN (HCC): ICD-10-CM

## 2024-12-10 DIAGNOSIS — E66.09 CLASS 1 OBESITY DUE TO EXCESS CALORIES WITH SERIOUS COMORBIDITY AND BODY MASS INDEX (BMI) OF 33.0 TO 33.9 IN ADULT: ICD-10-CM

## 2024-12-10 DIAGNOSIS — E66.811 CLASS 1 OBESITY DUE TO EXCESS CALORIES WITH SERIOUS COMORBIDITY AND BODY MASS INDEX (BMI) OF 33.0 TO 33.9 IN ADULT: ICD-10-CM

## 2024-12-10 PROCEDURE — 3075F SYST BP GE 130 - 139MM HG: CPT | Performed by: FAMILY MEDICINE

## 2024-12-10 PROCEDURE — 3044F HG A1C LEVEL LT 7.0%: CPT | Performed by: FAMILY MEDICINE

## 2024-12-10 PROCEDURE — 99215 OFFICE O/P EST HI 40 MIN: CPT | Performed by: FAMILY MEDICINE

## 2024-12-10 PROCEDURE — 3079F DIAST BP 80-89 MM HG: CPT | Performed by: FAMILY MEDICINE

## 2024-12-10 RX ORDER — FINERENONE 20 MG/1
1 TABLET, FILM COATED ORAL DAILY
COMMUNITY
Start: 2024-11-30 | End: 2024-12-10 | Stop reason: DRUGHIGH

## 2024-12-10 RX ORDER — CLOBETASOL PROPIONATE 0.5 MG/G
CREAM TOPICAL
Qty: 15 G | Refills: 1 | Status: SHIPPED | OUTPATIENT
Start: 2024-12-10

## 2024-12-10 SDOH — ECONOMIC STABILITY: FOOD INSECURITY: WITHIN THE PAST 12 MONTHS, YOU WORRIED THAT YOUR FOOD WOULD RUN OUT BEFORE YOU GOT MONEY TO BUY MORE.: NEVER TRUE

## 2024-12-10 SDOH — ECONOMIC STABILITY: FOOD INSECURITY: WITHIN THE PAST 12 MONTHS, THE FOOD YOU BOUGHT JUST DIDN'T LAST AND YOU DIDN'T HAVE MONEY TO GET MORE.: NEVER TRUE

## 2024-12-10 SDOH — ECONOMIC STABILITY: INCOME INSECURITY: HOW HARD IS IT FOR YOU TO PAY FOR THE VERY BASICS LIKE FOOD, HOUSING, MEDICAL CARE, AND HEATING?: NOT HARD AT ALL

## 2025-01-20 ENCOUNTER — TELEPHONE (OUTPATIENT)
Dept: PRIMARY CARE CLINIC | Age: 65
End: 2025-01-20

## 2025-01-20 DIAGNOSIS — F41.9 ANXIETY: Primary | ICD-10-CM

## 2025-01-20 RX ORDER — LORAZEPAM 0.5 MG/1
0.5 TABLET ORAL 3 TIMES DAILY PRN
Qty: 12 TABLET | Refills: 0 | Status: SHIPPED | OUTPATIENT
Start: 2025-01-20 | End: 2025-02-19

## 2025-01-20 NOTE — TELEPHONE ENCOUNTER
Pt calling there has been unexpected death in the family and  now he seems to be having a lot of anxiety, he does take Zoloft but that is not helping the anxiety.  Can you please send rx for anxiety    [facility] pharmacy  advise

## 2025-03-11 ENCOUNTER — TELEPHONE (OUTPATIENT)
Dept: CARDIOLOGY CLINIC | Age: 65
End: 2025-03-11

## 2025-03-14 ENCOUNTER — TELEPHONE (OUTPATIENT)
Dept: CARDIOLOGY CLINIC | Age: 65
End: 2025-03-14

## 2025-03-17 RX ORDER — BENZONATATE 200 MG/1
CAPSULE ORAL
COMMUNITY
Start: 2025-01-30

## 2025-03-17 RX ORDER — FEXOFENADINE HCL 180 MG/1
TABLET ORAL
COMMUNITY
Start: 2025-01-30

## 2025-03-17 RX ORDER — FLUTICASONE PROPIONATE 50 MCG
2 SPRAY, SUSPENSION (ML) NASAL DAILY PRN
COMMUNITY
Start: 2025-01-30

## 2025-03-17 RX ORDER — FINERENONE 20 MG/1
1 TABLET, FILM COATED ORAL DAILY
COMMUNITY
Start: 2025-02-10

## 2025-03-18 ENCOUNTER — OFFICE VISIT (OUTPATIENT)
Dept: PRIMARY CARE CLINIC | Age: 65
End: 2025-03-18
Payer: COMMERCIAL

## 2025-03-18 VITALS
SYSTOLIC BLOOD PRESSURE: 120 MMHG | RESPIRATION RATE: 16 BRPM | HEART RATE: 78 BPM | OXYGEN SATURATION: 99 % | HEIGHT: 66 IN | TEMPERATURE: 97.2 F | DIASTOLIC BLOOD PRESSURE: 82 MMHG | BODY MASS INDEX: 32.14 KG/M2 | WEIGHT: 200 LBS

## 2025-03-18 DIAGNOSIS — Z12.5 SCREENING FOR PROSTATE CANCER: ICD-10-CM

## 2025-03-18 DIAGNOSIS — M47.812 DEGENERATIVE JOINT DISEASE OF CERVICAL AND LUMBAR SPINE: ICD-10-CM

## 2025-03-18 DIAGNOSIS — J30.9 ALLERGIC RHINITIS, UNSPECIFIED SEASONALITY, UNSPECIFIED TRIGGER: ICD-10-CM

## 2025-03-18 DIAGNOSIS — E11.21 TYPE 2 DIABETES MELLITUS WITH DIABETIC NEPHROPATHY, WITHOUT LONG-TERM CURRENT USE OF INSULIN (HCC): ICD-10-CM

## 2025-03-18 DIAGNOSIS — K21.9 GASTROESOPHAGEAL REFLUX DISEASE WITHOUT ESOPHAGITIS: ICD-10-CM

## 2025-03-18 DIAGNOSIS — E78.2 MIXED HYPERLIPIDEMIA: ICD-10-CM

## 2025-03-18 DIAGNOSIS — M47.816 DEGENERATIVE JOINT DISEASE OF CERVICAL AND LUMBAR SPINE: ICD-10-CM

## 2025-03-18 DIAGNOSIS — I10 ESSENTIAL HYPERTENSION: Primary | ICD-10-CM

## 2025-03-18 LAB
ALBUMIN: 4.8 G/DL (ref 3.5–5.2)
ALP BLD-CCNC: 60 U/L (ref 40–129)
ALT SERPL-CCNC: 28 U/L (ref 0–40)
ANION GAP SERPL CALCULATED.3IONS-SCNC: 16 MMOL/L (ref 7–16)
AST SERPL-CCNC: 25 U/L (ref 0–39)
BASOPHILS ABSOLUTE: 0.03 K/UL (ref 0–0.2)
BASOPHILS RELATIVE PERCENT: 1 % (ref 0–2)
BILIRUB SERPL-MCNC: 0.4 MG/DL (ref 0–1.2)
BUN BLDV-MCNC: 36 MG/DL (ref 6–23)
CALCIUM SERPL-MCNC: 9.7 MG/DL (ref 8.6–10.2)
CHLORIDE BLD-SCNC: 99 MMOL/L (ref 98–107)
CHOLESTEROL, TOTAL: 167 MG/DL
CO2: 20 MMOL/L (ref 22–29)
CREAT SERPL-MCNC: 1.3 MG/DL (ref 0.7–1.2)
EOSINOPHILS ABSOLUTE: 0.21 K/UL (ref 0.05–0.5)
EOSINOPHILS RELATIVE PERCENT: 4 % (ref 0–6)
GFR, ESTIMATED: 63 ML/MIN/1.73M2
GLUCOSE BLD-MCNC: 189 MG/DL (ref 74–99)
HBA1C MFR BLD: 7.8 %
HCT VFR BLD CALC: 43.8 % (ref 37–54)
HDLC SERPL-MCNC: 44 MG/DL
HEMOGLOBIN: 14.6 G/DL (ref 12.5–16.5)
IMMATURE GRANULOCYTES %: 1 % (ref 0–5)
IMMATURE GRANULOCYTES ABSOLUTE: 0.04 K/UL (ref 0–0.58)
LDL CHOLESTEROL: 66 MG/DL
LYMPHOCYTES ABSOLUTE: 1.03 K/UL (ref 1.5–4)
LYMPHOCYTES RELATIVE PERCENT: 20 % (ref 20–42)
MCH RBC QN AUTO: 28.7 PG (ref 26–35)
MCHC RBC AUTO-ENTMCNC: 33.3 G/DL (ref 32–34.5)
MCV RBC AUTO: 86.1 FL (ref 80–99.9)
MONOCYTES ABSOLUTE: 0.42 K/UL (ref 0.1–0.95)
MONOCYTES RELATIVE PERCENT: 8 % (ref 2–12)
NEUTROPHILS ABSOLUTE: 3.51 K/UL (ref 1.8–7.3)
NEUTROPHILS RELATIVE PERCENT: 67 % (ref 43–80)
PDW BLD-RTO: 13.6 % (ref 11.5–15)
PLATELET # BLD: 168 K/UL (ref 130–450)
PMV BLD AUTO: 9.8 FL (ref 7–12)
POTASSIUM SERPL-SCNC: 4.7 MMOL/L (ref 3.5–5)
PROSTATE SPECIFIC ANTIGEN: 0.76 NG/ML (ref 0–4)
RBC # BLD: 5.09 M/UL (ref 3.8–5.8)
SODIUM BLD-SCNC: 135 MMOL/L (ref 132–146)
TOTAL PROTEIN: 7.6 G/DL (ref 6.4–8.3)
TRIGL SERPL-MCNC: 283 MG/DL
TSH SERPL DL<=0.05 MIU/L-ACNC: 1.77 UIU/ML (ref 0.27–4.2)
VLDLC SERPL CALC-MCNC: 57 MG/DL
WBC # BLD: 5.2 K/UL (ref 4.5–11.5)

## 2025-03-18 PROCEDURE — 3079F DIAST BP 80-89 MM HG: CPT | Performed by: FAMILY MEDICINE

## 2025-03-18 PROCEDURE — 83036 HEMOGLOBIN GLYCOSYLATED A1C: CPT | Performed by: FAMILY MEDICINE

## 2025-03-18 PROCEDURE — 3074F SYST BP LT 130 MM HG: CPT | Performed by: FAMILY MEDICINE

## 2025-03-18 PROCEDURE — 3051F HG A1C>EQUAL 7.0%<8.0%: CPT | Performed by: FAMILY MEDICINE

## 2025-03-18 PROCEDURE — 99215 OFFICE O/P EST HI 40 MIN: CPT | Performed by: FAMILY MEDICINE

## 2025-03-18 SDOH — ECONOMIC STABILITY: FOOD INSECURITY: WITHIN THE PAST 12 MONTHS, THE FOOD YOU BOUGHT JUST DIDN'T LAST AND YOU DIDN'T HAVE MONEY TO GET MORE.: NEVER TRUE

## 2025-03-18 SDOH — ECONOMIC STABILITY: FOOD INSECURITY: WITHIN THE PAST 12 MONTHS, YOU WORRIED THAT YOUR FOOD WOULD RUN OUT BEFORE YOU GOT MONEY TO BUY MORE.: NEVER TRUE

## 2025-03-18 ASSESSMENT — PATIENT HEALTH QUESTIONNAIRE - PHQ9
2. FEELING DOWN, DEPRESSED OR HOPELESS: NOT AT ALL
6. FEELING BAD ABOUT YOURSELF - OR THAT YOU ARE A FAILURE OR HAVE LET YOURSELF OR YOUR FAMILY DOWN: NOT AT ALL
3. TROUBLE FALLING OR STAYING ASLEEP: NOT AT ALL
SUM OF ALL RESPONSES TO PHQ QUESTIONS 1-9: 0
10. IF YOU CHECKED OFF ANY PROBLEMS, HOW DIFFICULT HAVE THESE PROBLEMS MADE IT FOR YOU TO DO YOUR WORK, TAKE CARE OF THINGS AT HOME, OR GET ALONG WITH OTHER PEOPLE: NOT DIFFICULT AT ALL
1. LITTLE INTEREST OR PLEASURE IN DOING THINGS: NOT AT ALL
SUM OF ALL RESPONSES TO PHQ QUESTIONS 1-9: 0
SUM OF ALL RESPONSES TO PHQ QUESTIONS 1-9: 0
7. TROUBLE CONCENTRATING ON THINGS, SUCH AS READING THE NEWSPAPER OR WATCHING TELEVISION: NOT AT ALL
4. FEELING TIRED OR HAVING LITTLE ENERGY: NOT AT ALL
8. MOVING OR SPEAKING SO SLOWLY THAT OTHER PEOPLE COULD HAVE NOTICED. OR THE OPPOSITE, BEING SO FIGETY OR RESTLESS THAT YOU HAVE BEEN MOVING AROUND A LOT MORE THAN USUAL: NOT AT ALL
5. POOR APPETITE OR OVEREATING: NOT AT ALL
9. THOUGHTS THAT YOU WOULD BE BETTER OFF DEAD, OR OF HURTING YOURSELF: NOT AT ALL
SUM OF ALL RESPONSES TO PHQ QUESTIONS 1-9: 0

## 2025-03-18 NOTE — PROGRESS NOTES
31g lancet left middle finger x 1  
mouth every morning (before breakfast) 90 capsule 3    losartan (COZAAR) 100 MG tablet Take 1 tablet by mouth every morning 90 tablet 3    Continuous Glucose Sensor (DEXCOM G7 SENSOR) MISC Change sensor every 10 days 3 each 11    empagliflozin (JARDIANCE) 25 MG tablet TAKE 1 TABLET BY MOUTH EVERY DAY 90 tablet 3    metFORMIN (GLUCOPHAGE) 1000 MG tablet TAKE 1 TABLET BY MOUTH TWICE A  tablet 3    amLODIPine (NORVASC) 10 MG tablet Take 1 tablet by mouth daily 90 tablet 1    tamsulosin (FLOMAX) 0.4 MG capsule Take 1 capsule by mouth daily 90 capsule 3    aspirin 81 MG tablet Take 1 tablet by mouth daily      ergocalciferol (ERGOCALCIFEROL) 1.25 MG (63917 UT) capsule Take 1 capsule by mouth once a week       No current facility-administered medications for this visit.      An  electronic signature was used to authenticate this note.    --Brendan Grayson MD on 3/18/2025 at 11:48 AM

## 2025-03-19 ENCOUNTER — TELEPHONE (OUTPATIENT)
Dept: PRIMARY CARE CLINIC | Age: 65
End: 2025-03-19

## 2025-03-19 ENCOUNTER — RESULTS FOLLOW-UP (OUTPATIENT)
Dept: PRIMARY CARE CLINIC | Age: 65
End: 2025-03-19

## 2025-03-19 NOTE — TELEPHONE ENCOUNTER
Received paper work, will fill out to the best of my ability and will have you sign when you come back tomorrow

## 2025-03-19 NOTE — TELEPHONE ENCOUNTER
[facility] called in states you are not in contract with them so they will not be completing the order [facility] W 3D  ordered yesterday they said we would have to complete our own authorization for the order due to you not being in contract with them, I asked them to fax over a contract

## 2025-04-03 NOTE — PROGRESS NOTES
Plastic Surgery consult paged to Dr. Shawna Castle via [address] Awaiting call back. Yes

## 2025-04-28 DIAGNOSIS — E11.21 TYPE 2 DIABETES MELLITUS WITH DIABETIC NEPHROPATHY, WITHOUT LONG-TERM CURRENT USE OF INSULIN (HCC): ICD-10-CM

## 2025-04-28 RX ORDER — EMPAGLIFLOZIN 25 MG/1
25 TABLET, FILM COATED ORAL DAILY
Qty: 90 TABLET | Refills: 0 | Status: SHIPPED | OUTPATIENT
Start: 2025-04-28

## 2025-05-12 DIAGNOSIS — E11.21 TYPE 2 DIABETES MELLITUS WITH DIABETIC NEPHROPATHY, WITHOUT LONG-TERM CURRENT USE OF INSULIN (HCC): ICD-10-CM

## 2025-05-12 RX ORDER — LOSARTAN POTASSIUM 100 MG/1
100 TABLET ORAL EVERY MORNING
Qty: 30 TABLET | Refills: 0 | Status: SHIPPED | OUTPATIENT
Start: 2025-05-12

## 2025-05-12 NOTE — TELEPHONE ENCOUNTER
Name of Medication(s) Requested:  Requested Prescriptions     Pending Prescriptions Disp Refills    losartan (COZAAR) 100 MG tablet [Pharmacy Med Name: LOSARTAN POTASSIUM 100 MG TAB] 30 tablet 0     Sig: TAKE 1 TABLET BY MOUTH EVERY DAY IN THE MORNING       Medication is on current medication list Yes    Dosage and directions were verified? Yes    Quantity verified: 30 day supply     Pharmacy Verified?  Yes    Last Appointment:  3/18/2025    Future appts:  Future Appointments   Date Time Provider Department Center   5/19/2025  3:00 PM Mehdi Chamberlain MD [facility] CARDIO [facility]   6/19/2025  9:00 AM Brendan Grayson MD TRAIL PC [facility]        (If no appt send self scheduling link. .REFILLAPPT)  Scheduling request sent?     [] Yes  [] No    Does patient need updated?  [] Yes  [] No

## 2025-05-13 RX ORDER — EMPAGLIFLOZIN 25 MG/1
25 TABLET, FILM COATED ORAL DAILY
Qty: 90 TABLET | Refills: 0 | Status: SHIPPED | OUTPATIENT
Start: 2025-05-13

## 2025-08-29 RX ORDER — LOSARTAN POTASSIUM 100 MG/1
100 TABLET ORAL EVERY MORNING
Qty: 30 TABLET | Refills: 3 | Status: SHIPPED | OUTPATIENT
Start: 2025-08-29

## (undated) DEVICE — KIT,ANTI FOG,W/SPONGE & FLUID,SOFT PACK: Brand: MEDLINE

## (undated) DEVICE — SEALER ENDOSCP L37CM NANO COAT BLNT TIP LAP DIV

## (undated) DEVICE — BINDER ABD UNISX 4 PNL PREM 6INX6INX12IN L XL 4

## (undated) DEVICE — COVER HNDL LT DISP

## (undated) DEVICE — SOLUTION IRRIG 2000ML 0.9% SOD CHL USP UROMATIC PLAS CONT

## (undated) DEVICE — INSUFFLATION NEEDLE TO ESTABLISH PNEUMOPERITONEUM.: Brand: INSUFFLATION NEEDLE

## (undated) DEVICE — TROCAR: Brand: KII SHIELDED BLADED ACCESS SYSTEM

## (undated) DEVICE — AGENT HEMSTAT 3GM PURIFIED PLNT STARCH PWD ABSRB ARISTA AH

## (undated) DEVICE — FORCEPS LAP DIA5MM BCOCK FEN TIP ROT NONARTICULATING LOK

## (undated) DEVICE — APPLIER CLP L9.375IN APER 2.1MM CLS L3.8MM 20 SM TI CLP

## (undated) DEVICE — Device

## (undated) DEVICE — SYRINGE 20ML LL S/C 50

## (undated) DEVICE — SOLUTION IV IRRIG POUR BRL 0.9% SODIUM CHL 2F7124

## (undated) DEVICE — INTENDED FOR TISSUE SEPARATION, AND OTHER PROCEDURES THAT REQUIRE A SHARP SURGICAL BLADE TO PUNCTURE OR CUT.: Brand: BARD-PARKER ® STAINLESS STEEL BLADES

## (undated) DEVICE — SPONGE LAP W18XL18IN WHT COT 4 PLY FLD STRUNG RADPQ DISP ST

## (undated) DEVICE — ELECTRODE PT RET AD L9FT HI MOIST COND ADH HYDRGEL CORDED

## (undated) DEVICE — CUTTER ENDOSCP L340MM LIN ARTC SGL STROKE FIRING ENDOPATH

## (undated) DEVICE — PUMP SUC IRR TBNG L10FT W/ HNDPC ASSEMB STRYKEFLOW 2

## (undated) DEVICE — GOWN,SIRUS,FABRNF,2XL,18/CS: Brand: MEDLINE

## (undated) DEVICE — TOTAL TRAY, DB, 100% SILI FOLEY, 16FR 10: Brand: MEDLINE

## (undated) DEVICE — APPLICATOR PREP 26ML 0.7% IOD POVACRYLEX 74% ISO ALC ST

## (undated) DEVICE — NEEDLE CLOSURE OMNICLOSE

## (undated) DEVICE — RELOAD STPL SZ 0 L45MM DIA3.5MM 0DEG STD REG TISS BLU TI

## (undated) DEVICE — TROCAR: Brand: KII FIOS FIRST ENTRY

## (undated) DEVICE — DOUBLE BASIN SET: Brand: MEDLINE INDUSTRIES, INC.

## (undated) DEVICE — PACK PROCEDURE SURG GEN CUST

## (undated) DEVICE — APPLICATOR SURG L14CM ARISTA AH FLEXTIP

## (undated) DEVICE — SLEEVE REPROCESS TROC CANN XCEL 5X100MM

## (undated) DEVICE — NEEDLE HYPO 25GA L1.5IN BLU POLYPR HUB S STL REG BVL STR

## (undated) DEVICE — DRAPE,CHEST,FENES,15X10,STERIL: Brand: MEDLINE

## (undated) DEVICE — STANDARD HYPODERMIC NEEDLE,POLYPROPYLENE HUB: Brand: MONOJECT

## (undated) DEVICE — PAD GEN USE BORDERED ADH 14IN 2IN AND 12IN 4IN GZ UNIV ST

## (undated) DEVICE — SKIN AFFIX SURG ADHESIVE 72/CS 0.55ML: Brand: MEDLINE

## (undated) DEVICE — TOWEL,OR,DSP,ST,GREEN,DLX,XR,4/PK,20PK/C: Brand: MEDLINE

## (undated) DEVICE — BLADE CLIPPER GEN PURP NS

## (undated) DEVICE — TROCAR: Brand: KII® SLEEVE

## (undated) DEVICE — GOWN,SIRUS,FABRNF,XL,20/CS: Brand: MEDLINE

## (undated) DEVICE — INSUFFLATION TUBING SET WITH FILTER, FUNNEL CONNECTOR AND LUER LOCK: Brand: JOSNOE MEDICAL INC